# Patient Record
Sex: MALE | Race: BLACK OR AFRICAN AMERICAN | NOT HISPANIC OR LATINO | Employment: FULL TIME | URBAN - METROPOLITAN AREA
[De-identification: names, ages, dates, MRNs, and addresses within clinical notes are randomized per-mention and may not be internally consistent; named-entity substitution may affect disease eponyms.]

---

## 2023-05-22 ENCOUNTER — OFFICE VISIT (OUTPATIENT)
Dept: PRIMARY CARE | Facility: CLINIC | Age: 38
End: 2023-05-22
Payer: COMMERCIAL

## 2023-05-22 ENCOUNTER — LAB (OUTPATIENT)
Dept: LAB | Facility: LAB | Age: 38
End: 2023-05-22
Payer: COMMERCIAL

## 2023-05-22 VITALS
DIASTOLIC BLOOD PRESSURE: 80 MMHG | SYSTOLIC BLOOD PRESSURE: 134 MMHG | HEART RATE: 87 BPM | RESPIRATION RATE: 17 BRPM | HEIGHT: 67 IN | WEIGHT: 167.7 LBS | BODY MASS INDEX: 26.32 KG/M2 | TEMPERATURE: 97.3 F | OXYGEN SATURATION: 96 %

## 2023-05-22 DIAGNOSIS — L30.9 ECZEMA, UNSPECIFIED TYPE: Primary | ICD-10-CM

## 2023-05-22 DIAGNOSIS — G47.33 OBSTRUCTIVE SLEEP APNEA OF ADULT: ICD-10-CM

## 2023-05-22 DIAGNOSIS — M10.9 GOUT, UNSPECIFIED CAUSE, UNSPECIFIED CHRONICITY, UNSPECIFIED SITE: ICD-10-CM

## 2023-05-22 DIAGNOSIS — E78.5 HYPERLIPIDEMIA, UNSPECIFIED HYPERLIPIDEMIA TYPE: ICD-10-CM

## 2023-05-22 PROBLEM — M21.6X2 PRONATION OF BOTH FEET: Status: ACTIVE | Noted: 2023-05-22

## 2023-05-22 PROBLEM — J35.3 TONSILLAR AND ADENOID HYPERTROPHY: Status: ACTIVE | Noted: 2023-05-22

## 2023-05-22 PROBLEM — M21.42 FLAT FEET, BILATERAL: Status: ACTIVE | Noted: 2023-05-22

## 2023-05-22 PROBLEM — M21.6X1 PRONATION OF BOTH FEET: Status: ACTIVE | Noted: 2023-05-22

## 2023-05-22 PROBLEM — D64.9 ANEMIA: Status: ACTIVE | Noted: 2023-05-22

## 2023-05-22 PROBLEM — R10.31 RIGHT INGUINAL PAIN: Status: ACTIVE | Noted: 2023-05-22

## 2023-05-22 PROBLEM — M21.41 FLAT FEET, BILATERAL: Status: ACTIVE | Noted: 2023-05-22

## 2023-05-22 PROBLEM — M25.571 ACUTE RIGHT ANKLE PAIN: Status: ACTIVE | Noted: 2023-05-22

## 2023-05-22 PROBLEM — F17.210 NICOTINE DEPENDENCE, CIGARETTES, UNCOMPLICATED: Status: ACTIVE | Noted: 2023-05-22

## 2023-05-22 LAB
BASOPHILS (10*3/UL) IN BLOOD BY AUTOMATED COUNT: 0.05 X10E9/L (ref 0–0.1)
BASOPHILS/100 LEUKOCYTES IN BLOOD BY AUTOMATED COUNT: 0.5 % (ref 0–2)
EOSINOPHILS (10*3/UL) IN BLOOD BY AUTOMATED COUNT: 0.15 X10E9/L (ref 0–0.7)
EOSINOPHILS/100 LEUKOCYTES IN BLOOD BY AUTOMATED COUNT: 1.6 % (ref 0–6)
ERYTHROCYTE DISTRIBUTION WIDTH (RATIO) BY AUTOMATED COUNT: 12.3 % (ref 11.5–14.5)
ERYTHROCYTE MEAN CORPUSCULAR HEMOGLOBIN CONCENTRATION (G/DL) BY AUTOMATED: 32.5 G/DL (ref 32–36)
ERYTHROCYTE MEAN CORPUSCULAR VOLUME (FL) BY AUTOMATED COUNT: 90 FL (ref 80–100)
ERYTHROCYTES (10*6/UL) IN BLOOD BY AUTOMATED COUNT: 4.73 X10E12/L (ref 4.5–5.9)
HEMATOCRIT (%) IN BLOOD BY AUTOMATED COUNT: 42.8 % (ref 41–52)
HEMOGLOBIN (G/DL) IN BLOOD: 13.9 G/DL (ref 13.5–17.5)
IMMATURE GRANULOCYTES/100 LEUKOCYTES IN BLOOD BY AUTOMATED COUNT: 0.2 % (ref 0–0.9)
LEUKOCYTES (10*3/UL) IN BLOOD BY AUTOMATED COUNT: 9.5 X10E9/L (ref 4.4–11.3)
LYMPHOCYTES (10*3/UL) IN BLOOD BY AUTOMATED COUNT: 2.78 X10E9/L (ref 1.2–4.8)
LYMPHOCYTES/100 LEUKOCYTES IN BLOOD BY AUTOMATED COUNT: 29.2 % (ref 13–44)
MONOCYTES (10*3/UL) IN BLOOD BY AUTOMATED COUNT: 0.56 X10E9/L (ref 0.1–1)
MONOCYTES/100 LEUKOCYTES IN BLOOD BY AUTOMATED COUNT: 5.9 % (ref 2–10)
NEUTROPHILS (10*3/UL) IN BLOOD BY AUTOMATED COUNT: 5.95 X10E9/L (ref 1.2–7.7)
NEUTROPHILS/100 LEUKOCYTES IN BLOOD BY AUTOMATED COUNT: 62.6 % (ref 40–80)
NRBC (PER 100 WBCS) BY AUTOMATED COUNT: 0 /100 WBC (ref 0–0)
PLATELETS (10*3/UL) IN BLOOD AUTOMATED COUNT: 378 X10E9/L (ref 150–450)
THYROTROPIN (MIU/L) IN SER/PLAS BY DETECTION LIMIT <= 0.05 MIU/L: 0.91 MIU/L (ref 0.44–3.98)

## 2023-05-22 PROCEDURE — 36415 COLL VENOUS BLD VENIPUNCTURE: CPT

## 2023-05-22 PROCEDURE — 99213 OFFICE O/P EST LOW 20 MIN: CPT | Performed by: FAMILY MEDICINE

## 2023-05-22 PROCEDURE — 1036F TOBACCO NON-USER: CPT | Performed by: FAMILY MEDICINE

## 2023-05-22 PROCEDURE — 83721 ASSAY OF BLOOD LIPOPROTEIN: CPT

## 2023-05-22 PROCEDURE — 80053 COMPREHEN METABOLIC PANEL: CPT

## 2023-05-22 PROCEDURE — 82465 ASSAY BLD/SERUM CHOLESTEROL: CPT

## 2023-05-22 PROCEDURE — 83718 ASSAY OF LIPOPROTEIN: CPT

## 2023-05-22 PROCEDURE — 84443 ASSAY THYROID STIM HORMONE: CPT

## 2023-05-22 PROCEDURE — 84550 ASSAY OF BLOOD/URIC ACID: CPT

## 2023-05-22 PROCEDURE — 85025 COMPLETE CBC W/AUTO DIFF WBC: CPT

## 2023-05-22 RX ORDER — COLCHICINE 0.6 MG/1
TABLET ORAL
COMMUNITY
Start: 2022-03-05 | End: 2023-09-25 | Stop reason: SDUPTHER

## 2023-05-22 RX ORDER — ALLOPURINOL 100 MG/1
1 TABLET ORAL DAILY
COMMUNITY
Start: 2022-02-07 | End: 2023-06-20 | Stop reason: SDUPTHER

## 2023-05-22 RX ORDER — TRIAMCINOLONE ACETONIDE 1 MG/G
CREAM TOPICAL 2 TIMES DAILY
Qty: 45 G | Refills: 0 | Status: SHIPPED | OUTPATIENT
Start: 2023-05-22 | End: 2024-02-23 | Stop reason: SDUPTHER

## 2023-05-22 ASSESSMENT — PATIENT HEALTH QUESTIONNAIRE - PHQ9
SUM OF ALL RESPONSES TO PHQ9 QUESTIONS 1 AND 2: 0
1. LITTLE INTEREST OR PLEASURE IN DOING THINGS: NOT AT ALL
2. FEELING DOWN, DEPRESSED OR HOPELESS: NOT AT ALL

## 2023-05-22 ASSESSMENT — LIFESTYLE VARIABLES: HOW MANY STANDARD DRINKS CONTAINING ALCOHOL DO YOU HAVE ON A TYPICAL DAY: PATIENT DOES NOT DRINK

## 2023-05-22 NOTE — PROGRESS NOTES
"Subjective   Patient ID: Pilo Hanks is a 38 y.o. male who presents for Follow-up (Pt states he has a dark spot in the middle of his chest).    HPI   New rash on chest- 4+ months ago  Non itchy  No change on shops/ deterg / lotions    GOUT  Doing well  Using allopurinol daily    DAMON  Did not get CPAP  Sleep is ok as long can get to sleep ( has young kids)  Wife not complaining of snoring    Review of Systems  All systems reviewed and neg if not noted in the HPI above       Objective   /80 (Patient Position: Sitting)   Pulse 87   Temp 36.3 °C (97.3 °F)   Resp 17   Ht 1.702 m (5' 7\")   Wt 76.1 kg (167 lb 11.2 oz)   SpO2 96%   BMI 26.27 kg/m²     Physical Exam  Skin:     Findings: Rash present. Rash is macular and scaling.                Assessment/Plan   Problem List Items Addressed This Visit          Nervous    Obstructive sleep apnea of adult       Other    Gout    Relevant Orders    Uric acid    HLD (hyperlipidemia)    Relevant Orders    Comprehensive Metabolic Panel    CBC and Auto Differential    Lipid Panel    TSH with reflex to Free T4 if abnormal     Other Visit Diagnoses       Eczema, unspecified type    -  Primary    Relevant Medications    triamcinolone (Kenalog) 0.1 % cream               "

## 2023-05-22 NOTE — PATIENT INSTRUCTIONS
Labs today    Cream for rash 2 x per day X 2-3 wks- let me know if not better        Please follow up in 3months for rash follow up and wellness or as needed.       ** If labs or imaging ordered at today's visit, all the non-urgent results will be discussed at your next visit    If you have been referred for a special test or to a specialist please call  [phone] to schedule an appointment.  If you have any further questions, or if develop new or worsened symptoms, please give our office a call at [phone].

## 2023-05-23 LAB
ALANINE AMINOTRANSFERASE (SGPT) (U/L) IN SER/PLAS: 10 U/L (ref 10–52)
ALBUMIN (G/DL) IN SER/PLAS: 5 G/DL (ref 3.4–5)
ALKALINE PHOSPHATASE (U/L) IN SER/PLAS: 50 U/L (ref 33–120)
ANION GAP IN SER/PLAS: 16 MMOL/L (ref 10–20)
ASPARTATE AMINOTRANSFERASE (SGOT) (U/L) IN SER/PLAS: 15 U/L (ref 9–39)
BILIRUBIN TOTAL (MG/DL) IN SER/PLAS: 0.5 MG/DL (ref 0–1.2)
CALCIUM (MG/DL) IN SER/PLAS: 9.8 MG/DL (ref 8.6–10.6)
CARBON DIOXIDE, TOTAL (MMOL/L) IN SER/PLAS: 29 MMOL/L (ref 21–32)
CHLORIDE (MMOL/L) IN SER/PLAS: 101 MMOL/L (ref 98–107)
CHOLESTEROL (MG/DL) IN SER/PLAS: 255 MG/DL (ref 0–199)
CHOLESTEROL IN HDL (MG/DL) IN SER/PLAS: 42.5 MG/DL
CHOLESTEROL IN LDL (MG/DL) IN SER/PLAS BY DIRECT ASSAY: 189 MG/DL (ref 0–129)
CHOLESTEROL/HDL RATIO: 6
CREATININE (MG/DL) IN SER/PLAS: 1.18 MG/DL (ref 0.5–1.3)
GFR MALE: 80 ML/MIN/1.73M2
GLUCOSE (MG/DL) IN SER/PLAS: 69 MG/DL (ref 74–99)
NON-HDL CHOLESTEROL: 213 MG/DL
POTASSIUM (MMOL/L) IN SER/PLAS: 4.5 MMOL/L (ref 3.5–5.3)
PROTEIN TOTAL: 8.4 G/DL (ref 6.4–8.2)
SODIUM (MMOL/L) IN SER/PLAS: 141 MMOL/L (ref 136–145)
URATE (MG/DL) IN SER/PLAS: 6.8 MG/DL (ref 4–7.5)
UREA NITROGEN (MG/DL) IN SER/PLAS: 16 MG/DL (ref 6–23)

## 2023-05-24 DIAGNOSIS — E78.5 HYPERLIPIDEMIA, UNSPECIFIED HYPERLIPIDEMIA TYPE: Primary | ICD-10-CM

## 2023-06-20 ENCOUNTER — PATIENT MESSAGE (OUTPATIENT)
Dept: PRIMARY CARE | Facility: CLINIC | Age: 38
End: 2023-06-20
Payer: COMMERCIAL

## 2023-06-20 DIAGNOSIS — M10.9 GOUT, UNSPECIFIED CAUSE, UNSPECIFIED CHRONICITY, UNSPECIFIED SITE: Primary | ICD-10-CM

## 2023-06-21 RX ORDER — ALLOPURINOL 100 MG/1
100 TABLET ORAL DAILY
Qty: 90 TABLET | Refills: 3 | Status: SHIPPED | OUTPATIENT
Start: 2023-06-21 | End: 2024-02-23 | Stop reason: SDUPTHER

## 2023-09-08 ENCOUNTER — APPOINTMENT (OUTPATIENT)
Dept: PRIMARY CARE | Facility: CLINIC | Age: 38
End: 2023-09-08
Payer: COMMERCIAL

## 2023-09-22 ENCOUNTER — LAB (OUTPATIENT)
Dept: LAB | Facility: LAB | Age: 38
End: 2023-09-22
Payer: COMMERCIAL

## 2023-09-22 DIAGNOSIS — E78.5 HYPERLIPIDEMIA, UNSPECIFIED HYPERLIPIDEMIA TYPE: ICD-10-CM

## 2023-09-22 LAB
ALANINE AMINOTRANSFERASE (SGPT) (U/L) IN SER/PLAS: 11 U/L (ref 10–52)
ALBUMIN (G/DL) IN SER/PLAS: 4.5 G/DL (ref 3.4–5)
ALKALINE PHOSPHATASE (U/L) IN SER/PLAS: 46 U/L (ref 33–120)
ANION GAP IN SER/PLAS: 13 MMOL/L (ref 10–20)
ASPARTATE AMINOTRANSFERASE (SGOT) (U/L) IN SER/PLAS: 16 U/L (ref 9–39)
BILIRUBIN TOTAL (MG/DL) IN SER/PLAS: 0.6 MG/DL (ref 0–1.2)
CALCIUM (MG/DL) IN SER/PLAS: 9.4 MG/DL (ref 8.6–10.6)
CARBON DIOXIDE, TOTAL (MMOL/L) IN SER/PLAS: 29 MMOL/L (ref 21–32)
CHLORIDE (MMOL/L) IN SER/PLAS: 102 MMOL/L (ref 98–107)
CHOLESTEROL (MG/DL) IN SER/PLAS: 226 MG/DL (ref 0–199)
CHOLESTEROL IN HDL (MG/DL) IN SER/PLAS: 39.7 MG/DL
CHOLESTEROL/HDL RATIO: 5.7
CREATININE (MG/DL) IN SER/PLAS: 1.2 MG/DL (ref 0.5–1.3)
GFR MALE: 79 ML/MIN/1.73M2
GLUCOSE (MG/DL) IN SER/PLAS: 86 MG/DL (ref 74–99)
LDL: 149 MG/DL (ref 0–99)
POTASSIUM (MMOL/L) IN SER/PLAS: 4.1 MMOL/L (ref 3.5–5.3)
PROTEIN TOTAL: 7.4 G/DL (ref 6.4–8.2)
SODIUM (MMOL/L) IN SER/PLAS: 140 MMOL/L (ref 136–145)
TRIGLYCERIDE (MG/DL) IN SER/PLAS: 187 MG/DL (ref 0–149)
UREA NITROGEN (MG/DL) IN SER/PLAS: 13 MG/DL (ref 6–23)
VLDL: 37 MG/DL (ref 0–40)

## 2023-09-22 PROCEDURE — 80053 COMPREHEN METABOLIC PANEL: CPT

## 2023-09-22 PROCEDURE — 36415 COLL VENOUS BLD VENIPUNCTURE: CPT

## 2023-09-22 PROCEDURE — 80061 LIPID PANEL: CPT

## 2023-09-25 ENCOUNTER — OFFICE VISIT (OUTPATIENT)
Dept: PRIMARY CARE | Facility: CLINIC | Age: 38
End: 2023-09-25
Payer: COMMERCIAL

## 2023-09-25 VITALS
DIASTOLIC BLOOD PRESSURE: 70 MMHG | TEMPERATURE: 97.3 F | HEIGHT: 67 IN | HEART RATE: 65 BPM | RESPIRATION RATE: 12 BRPM | OXYGEN SATURATION: 97 % | WEIGHT: 168.3 LBS | SYSTOLIC BLOOD PRESSURE: 118 MMHG | BODY MASS INDEX: 26.42 KG/M2

## 2023-09-25 DIAGNOSIS — Z00.00 WELLNESS EXAMINATION: Primary | ICD-10-CM

## 2023-09-25 DIAGNOSIS — M10.9 GOUT, UNSPECIFIED CAUSE, UNSPECIFIED CHRONICITY, UNSPECIFIED SITE: ICD-10-CM

## 2023-09-25 DIAGNOSIS — E78.5 HYPERLIPIDEMIA, UNSPECIFIED HYPERLIPIDEMIA TYPE: ICD-10-CM

## 2023-09-25 DIAGNOSIS — Z30.09 FAMILY PLANNING COUNSELING: ICD-10-CM

## 2023-09-25 PROBLEM — F17.210 NICOTINE DEPENDENCE, CIGARETTES, UNCOMPLICATED: Status: RESOLVED | Noted: 2023-05-22 | Resolved: 2023-09-25

## 2023-09-25 PROCEDURE — 99395 PREV VISIT EST AGE 18-39: CPT | Performed by: FAMILY MEDICINE

## 2023-09-25 PROCEDURE — 1036F TOBACCO NON-USER: CPT | Performed by: FAMILY MEDICINE

## 2023-09-25 RX ORDER — COLCHICINE 0.6 MG/1
TABLET ORAL
Qty: 9 TABLET | Refills: 0 | Status: SHIPPED | OUTPATIENT
Start: 2023-09-25 | End: 2024-02-23 | Stop reason: SDUPTHER

## 2023-09-25 ASSESSMENT — PROMIS GLOBAL HEALTH SCALE
RATE_GENERAL_HEALTH: FAIR
EMOTIONAL_PROBLEMS: RARELY
RATE_SOCIAL_SATISFACTION: EXCELLENT
CARRYOUT_PHYSICAL_ACTIVITIES: COMPLETELY
RATE_QUALITY_OF_LIFE: VERY GOOD
RATE_AVERAGE_PAIN: 1
RATE_MENTAL_HEALTH: EXCELLENT
CARRYOUT_SOCIAL_ACTIVITIES: EXCELLENT
RATE_PHYSICAL_HEALTH: GOOD

## 2023-09-25 ASSESSMENT — PATIENT HEALTH QUESTIONNAIRE - PHQ9
2. FEELING DOWN, DEPRESSED OR HOPELESS: NOT AT ALL
1. LITTLE INTEREST OR PLEASURE IN DOING THINGS: NOT AT ALL
SUM OF ALL RESPONSES TO PHQ9 QUESTIONS 1 AND 2: 0

## 2023-09-25 NOTE — PROGRESS NOTES
"Subjective   Patient ID: Pilo Hanks is a 38 y.o. male who presents for Annual Exam.    HPI     Would like to get vasectomy    GOUT  Doing well  Using allopurinol daily     DAMON- thinking doing ok w/o  Wife states no snoring  Thinking due to after eoth- has cut back  No daytime sleepiness    HLD  LDL dec to 149- down from 189  Working on diet and exercise    ---Social---  Job: accounting  : wife  Kids:3 (15, 8, 3)- [place]  Likes: sport- watch and play ( basketball and football)  No foreign travel plans      ETOH - Weekend/ social  Drugs - Marijuana (weekends)  Tobacco - Quit smoking 8month      Review of Systems  All systems reviewed and neg if not noted in the HPI above     Objective   /70 (Patient Position: Sitting)   Pulse 65   Temp 36.3 °C (97.3 °F)   Resp 12   Ht 1.702 m (5' 7\")   Wt 76.3 kg (168 lb 4.8 oz)   SpO2 97%   BMI 26.36 kg/m²     Physical Exam  Pleasant  Eyes: conjunctiva non-icteric and eye lids are without obvious rash or drooping. Pupils are symmetric.   Ears, Nose, Mouth, and Throat: External ears and nose appear to be without deformity or rash. No lesions or masses noted. Hearing is grossly intact.   CV: RRR, no murmur  Pulm:CTA B/L  Abd: soft, NTTP, + BS  LE: no edema  Psychiatric: Alert, orientation to person, place, and time. Recent/remote memory as evidenced through face-to-face interaction and discussion appear grossly intact. Mood and affect are normal.        Assessment/Plan   Problem List Items Addressed This Visit       Gout    Relevant Medications    colchicine, gout, 0.6 mg tablet    HLD (hyperlipidemia)     Other Visit Diagnoses       Wellness examination    -  Primary    Family planning counseling        Relevant Orders    Referral to Urology              Please follow up in 1 yr for wellness  or as needed.      "

## 2023-09-25 NOTE — PATIENT INSTRUCTIONS
Labs prior to our next appt    To call for eye exam    Continue to work on healthy diet and exercise  We encourage all patients to engage in exercise 150 minutes per week   Adults 18 and older, activity during each week - if you are able:    Engage in at least 150 minutes of moderate or vigorous exercise per week   If you are able- Engage in muscle strengthening activity on 2 or more days per week      Please follow up in 1 yr for wellness  or as needed.       ** If labs or imaging ordered at today's visit, all the non-urgent results will be discussed at your next visit    If you have been referred for a special test or to a specialist please call  [phone] to schedule an appointment.  If you have any further questions, or if develop new or worsened symptoms, please give our office a call at [phone].

## 2024-02-20 ENCOUNTER — APPOINTMENT (OUTPATIENT)
Dept: PRIMARY CARE | Facility: CLINIC | Age: 39
End: 2024-02-20
Payer: COMMERCIAL

## 2024-02-20 ENCOUNTER — TELEPHONE (OUTPATIENT)
Dept: PRIMARY CARE | Facility: CLINIC | Age: 39
End: 2024-02-20

## 2024-02-20 NOTE — TELEPHONE ENCOUNTER
Patient stated he sent you a message and pictures via [employer]. He noticed a growth on the back of his tongue. He was advised to go to .

## 2024-02-23 ENCOUNTER — LAB (OUTPATIENT)
Dept: LAB | Facility: LAB | Age: 39
End: 2024-02-23
Payer: COMMERCIAL

## 2024-02-23 ENCOUNTER — OFFICE VISIT (OUTPATIENT)
Dept: PRIMARY CARE | Facility: CLINIC | Age: 39
End: 2024-02-23
Payer: COMMERCIAL

## 2024-02-23 VITALS
OXYGEN SATURATION: 96 % | HEIGHT: 67 IN | WEIGHT: 167.3 LBS | RESPIRATION RATE: 16 BRPM | DIASTOLIC BLOOD PRESSURE: 76 MMHG | BODY MASS INDEX: 26.26 KG/M2 | HEART RATE: 68 BPM | TEMPERATURE: 98 F | SYSTOLIC BLOOD PRESSURE: 126 MMHG

## 2024-02-23 DIAGNOSIS — J35.3 TONSILLAR AND ADENOID HYPERTROPHY: ICD-10-CM

## 2024-02-23 DIAGNOSIS — L30.9 ECZEMA, UNSPECIFIED TYPE: ICD-10-CM

## 2024-02-23 DIAGNOSIS — M10.9 GOUT, UNSPECIFIED CAUSE, UNSPECIFIED CHRONICITY, UNSPECIFIED SITE: ICD-10-CM

## 2024-02-23 DIAGNOSIS — K13.79 MASS OF ORAL CAVITY: Primary | ICD-10-CM

## 2024-02-23 DIAGNOSIS — K13.79 MASS OF ORAL CAVITY: ICD-10-CM

## 2024-02-23 LAB
BASOPHILS # BLD AUTO: 0.04 X10*3/UL (ref 0–0.1)
BASOPHILS NFR BLD AUTO: 0.5 %
EOSINOPHIL # BLD AUTO: 0.16 X10*3/UL (ref 0–0.7)
EOSINOPHIL NFR BLD AUTO: 1.9 %
ERYTHROCYTE [DISTWIDTH] IN BLOOD BY AUTOMATED COUNT: 12.9 % (ref 11.5–14.5)
HCT VFR BLD AUTO: 40.9 % (ref 41–52)
HGB BLD-MCNC: 13.7 G/DL (ref 13.5–17.5)
IMM GRANULOCYTES # BLD AUTO: 0.03 X10*3/UL (ref 0–0.7)
IMM GRANULOCYTES NFR BLD AUTO: 0.4 % (ref 0–0.9)
LYMPHOCYTES # BLD AUTO: 3.16 X10*3/UL (ref 1.2–4.8)
LYMPHOCYTES NFR BLD AUTO: 37.8 %
MCH RBC QN AUTO: 29.7 PG (ref 26–34)
MCHC RBC AUTO-ENTMCNC: 33.5 G/DL (ref 32–36)
MCV RBC AUTO: 89 FL (ref 80–100)
MONOCYTES # BLD AUTO: 0.7 X10*3/UL (ref 0.1–1)
MONOCYTES NFR BLD AUTO: 8.4 %
NEUTROPHILS # BLD AUTO: 4.28 X10*3/UL (ref 1.2–7.7)
NEUTROPHILS NFR BLD AUTO: 51 %
NRBC BLD-RTO: 0 /100 WBCS (ref 0–0)
PLATELET # BLD AUTO: 311 X10*3/UL (ref 150–450)
RBC # BLD AUTO: 4.62 X10*6/UL (ref 4.5–5.9)
WBC # BLD AUTO: 8.4 X10*3/UL (ref 4.4–11.3)

## 2024-02-23 PROCEDURE — 1036F TOBACCO NON-USER: CPT | Performed by: FAMILY MEDICINE

## 2024-02-23 PROCEDURE — 99213 OFFICE O/P EST LOW 20 MIN: CPT | Performed by: FAMILY MEDICINE

## 2024-02-23 PROCEDURE — 36415 COLL VENOUS BLD VENIPUNCTURE: CPT

## 2024-02-23 PROCEDURE — 85025 COMPLETE CBC W/AUTO DIFF WBC: CPT

## 2024-02-23 RX ORDER — COLCHICINE 0.6 MG/1
TABLET ORAL
Qty: 9 TABLET | Refills: 0 | Status: SHIPPED | OUTPATIENT
Start: 2024-02-23

## 2024-02-23 RX ORDER — ALLOPURINOL 100 MG/1
100 TABLET ORAL DAILY
Qty: 90 TABLET | Refills: 3 | Status: SHIPPED | OUTPATIENT
Start: 2024-02-23

## 2024-02-23 RX ORDER — TRIAMCINOLONE ACETONIDE 1 MG/G
CREAM TOPICAL 2 TIMES DAILY
Qty: 80 G | Refills: 1 | Status: SHIPPED | OUTPATIENT
Start: 2024-02-23 | End: 2024-06-01 | Stop reason: HOSPADM

## 2024-02-23 ASSESSMENT — LIFESTYLE VARIABLES
HOW MANY STANDARD DRINKS CONTAINING ALCOHOL DO YOU HAVE ON A TYPICAL DAY: 1 OR 2
AUDIT-C TOTAL SCORE: 1
SKIP TO QUESTIONS 9-10: 1
HOW OFTEN DO YOU HAVE A DRINK CONTAINING ALCOHOL: MONTHLY OR LESS
HOW OFTEN DO YOU HAVE SIX OR MORE DRINKS ON ONE OCCASION: NEVER

## 2024-02-23 ASSESSMENT — PATIENT HEALTH QUESTIONNAIRE - PHQ9
1. LITTLE INTEREST OR PLEASURE IN DOING THINGS: NOT AT ALL
2. FEELING DOWN, DEPRESSED OR HOPELESS: NOT AT ALL
SUM OF ALL RESPONSES TO PHQ9 QUESTIONS 1 & 2: 0

## 2024-02-23 NOTE — PROGRESS NOTES
"Subjective   Patient ID: Pilo Hanks is a 39 y.o. male who presents for Follow-up (Patien is following up for ENT referral.).    HPI     4days now with growth on he left tonsilar area    Review of Systems  All systems reviewed and neg if not noted in the HPI above     Objective   /76 (BP Location: Right arm, Patient Position: Sitting, BP Cuff Size: Adult)   Pulse 68   Temp 36.7 °C (98 °F)   Resp 16   Ht 1.702 m (5' 7\")   Wt 75.9 kg (167 lb 4.8 oz)   SpO2 96%   BMI 26.20 kg/m²     Physical Exam  HENT:      Mouth/Throat:      Mouth: Mucous membranes are pale and dry. Oral lesions present. No injury, lacerations or angioedema.      Palate: No lesions.      Tonsils: No tonsillar exudate.           Assessment/Plan   Problem List Items Addressed This Visit             ICD-10-CM    Gout M10.9    Relevant Medications    allopurinol (Zyloprim) 100 mg tablet    colchicine 0.6 mg tablet    Tonsillar and adenoid hypertrophy J35.3    Relevant Orders    Referral to ENT    CBC and Auto Differential (Completed)     Other Visit Diagnoses         Codes    Mass of oral cavity    -  Primary K13.79    Relevant Orders    Referral to ENT    CBC and Auto Differential (Completed)    Eczema, unspecified type     L30.9    Relevant Medications    triamcinolone (Kenalog) 0.1 % cream               Please follow up in as scheduled in Sept for wellness or as needed.       "

## 2024-02-23 NOTE — PATIENT INSTRUCTIONS
Oral cavity mass  - referral for ENT    Labs today (ordered prior appt)    GOUT  - ok  - checking labs  - ran out allopurinol X 2 wks      Please follow up in as scheduled in Sept for wellness or as needed.       ** If labs or imaging ordered at today's visit, all the non-urgent results will be discussed at your next visit    If you have been referred for a special test or to a specialist please call  [phone] to schedule an appointment.  If you have any further questions, or if develop new or worsened symptoms, please give our office a call at [phone].

## 2024-03-05 ENCOUNTER — APPOINTMENT (OUTPATIENT)
Dept: OTOLARYNGOLOGY | Facility: HOSPITAL | Age: 39
End: 2024-03-05
Payer: COMMERCIAL

## 2024-03-12 ENCOUNTER — OFFICE VISIT (OUTPATIENT)
Dept: UROLOGY | Facility: HOSPITAL | Age: 39
End: 2024-03-12
Payer: COMMERCIAL

## 2024-03-12 DIAGNOSIS — Z30.09 VASECTOMY EVALUATION: Primary | ICD-10-CM

## 2024-03-12 PROCEDURE — 1036F TOBACCO NON-USER: CPT | Performed by: STUDENT IN AN ORGANIZED HEALTH CARE EDUCATION/TRAINING PROGRAM

## 2024-03-12 PROCEDURE — 99214 OFFICE O/P EST MOD 30 MIN: CPT | Performed by: STUDENT IN AN ORGANIZED HEALTH CARE EDUCATION/TRAINING PROGRAM

## 2024-03-12 PROCEDURE — 99204 OFFICE O/P NEW MOD 45 MIN: CPT | Performed by: STUDENT IN AN ORGANIZED HEALTH CARE EDUCATION/TRAINING PROGRAM

## 2024-03-12 NOTE — PROGRESS NOTES
Subjective   Patient ID: Pilo Hanks is a 39 y.o. male    HPI  39 y.o. male who present for vasectomy evaluation, the patient has three kids, I had a long and extensive discussion with the patient regarding vasectomy. I informed him that he should consider this procedure as permanent and he should be 100% sure about proceeding with it. I explained to him in detail the steps of the vasectomy, I also had a long discussion with him regarding the possible side effects including infection, hematoma, bleeding, chronic pain, testicular congestion, injury to surrounding structure. We also discussed the multiple studies linking vasectomy to prostate cancer I explained to him the correlation between this procedure and prostate cancer. The patient verbalized understanding of all the risks and benefits and would like to proceed. I explained to him that there is a small chance of spontaneous return of the semen because of reconnection of his vas ending. I also told him that he is not close to the sterile unless it is proven by a post vasectomy semen analysis after around 3 months. I also explained to him that some man with a much longer to clear his semen from there, this most could take up to 6 months during which he would be still able to proceed.      Review of Systems    All systems were reviewed. Anything negative was noted in the HPI.    Objective   Physical Exam    General: Well developed, well nourished, alert and cooperative, appears in no acute distress   Eyes: Non-injected conjunctiva, sclera clear, no proptosis   Cardiac: Extremities are warm and well perfused. No edema, cyanosis or pallor   Lungs: Breathing is easy, non-labored. Speaking in clear and complete sentences. Normal diaphragmatic movement   MSK: Ambulatory with steady gait, unassisted   Neuro: Alert and oriented to person, place, and time   Psych: Demonstrates good judgment and reason, without hallucinations, abnormal affect or abnormal behaviors   Skin:  No obvious lesions, no rashes       No CVA tenderness bilaterally   No suprapubic pain or discomfort       Past Medical History:   Diagnosis Date    Acute tonsillitis, unspecified 01/21/2016    Acute tonsillitis, unspecified etiology    Hypertrophy of tonsils 01/11/2017    Lingual tonsil hypertrophy    Hypertrophy of tonsils 01/05/2017    Tonsillar hypertrophy    Other specified health status     No pertinent past medical history    Pain in right toe(s) 09/14/2021    Great toe pain, right    Pain in unspecified ankle and joints of unspecified foot 09/15/2021    Ankle pain    Personal history of other diseases of the nervous system and sense organs 07/27/2021    History of obstructive sleep apnea    Personal history of other diseases of the respiratory system 07/27/2021    History of acute pharyngitis    Personal history of other diseases of the respiratory system 01/21/2016    History of sore throat    Personal history of other diseases of the respiratory system 01/05/2017    History of airway obstruction    Personal history of other specified conditions 07/27/2021    History of chest pain    Sprain of joints and ligaments of unspecified parts of neck, initial encounter 08/21/2021    Neck sprain    Sprain of unspecified ligament of right ankle, initial encounter 03/26/2017    Sprain of right ankle, unspecified ligament, initial encounter         Past Surgical History:   Procedure Laterality Date    MOUTH SURGERY  01/11/2017    Oral Surgery Tooth Extraction       Assessment/Plan   Vasectomy evaluation    39 y.o. male who presents for the above condition, Vasectomy Evaluation     Patient presents today for evaluation of vasectomy. We had an extensive discussion about the procedure and post-procedure protocol. We discussed that vasectomy is intended to be a permanent form of contraception. There are options for fertility post vasectomy, including vasectomy reversal and sperm retrieval but these are not always  successful and can be rather expensive.  We highlighted that it is not an immediate form of contraception, and that another form is required until vas occlusion is confirmed with a post-vasectomy semen analysis. We recommend that the patient refrain from ejaculation for 1 week post-procedure and we would be checking a post-vasectomy semen analysis in 2-3 months, or 15-20 ejaculates. Need for repeat vasectomy is very low, <1%. There is a very small risk for pregnancy after vasectomy (1 in 2,000). We define a successful vasectomy by achieving azoospermia or less than 100,000 non-motile sperm on post-vasectomy semen analysis.  We discussed that the procedure would be done in the office under local anesthesia. Complications were discussed including but not limited to pain, which can be chronic in nature, bleeding/hematoma formation, and infection. We recommended ice and rest for the next 48-72hrs. Patient may be prescribed an anti-inflammatory for pain control. Patient is instructed to refrain from strenuous activity for 2 weeks.  No barriers to learning were identified. After all of the patient’s questions were satisfactorily answered, he expressed understanding of the risks of surgery and wishes to proceed with vasectomy.     Plan:  - Schedule patient for vasectomy           Scribe Attestation  By signing my name below, ISandy Scribe   attest that this documentation has been prepared under the direction and in the presence of Dr. David Orourke

## 2024-03-15 ENCOUNTER — OFFICE VISIT (OUTPATIENT)
Dept: OTOLARYNGOLOGY | Facility: HOSPITAL | Age: 39
End: 2024-03-15
Payer: COMMERCIAL

## 2024-03-15 VITALS — HEIGHT: 67 IN | TEMPERATURE: 97 F | BODY MASS INDEX: 26.48 KG/M2 | WEIGHT: 168.7 LBS

## 2024-03-15 DIAGNOSIS — K14.8 MASS OF TONGUE: Primary | ICD-10-CM

## 2024-03-15 PROCEDURE — 99204 OFFICE O/P NEW MOD 45 MIN: CPT | Performed by: OTOLARYNGOLOGY

## 2024-03-15 PROCEDURE — 99214 OFFICE O/P EST MOD 30 MIN: CPT | Performed by: OTOLARYNGOLOGY

## 2024-03-15 PROCEDURE — 88305 TISSUE EXAM BY PATHOLOGIST: CPT | Mod: TC,SUR | Performed by: OTOLARYNGOLOGY

## 2024-03-15 PROCEDURE — 1036F TOBACCO NON-USER: CPT | Performed by: OTOLARYNGOLOGY

## 2024-03-15 PROCEDURE — 88305 TISSUE EXAM BY PATHOLOGIST: CPT | Performed by: DENTIST

## 2024-03-15 PROCEDURE — 88342 IMHCHEM/IMCYTCHM 1ST ANTB: CPT | Performed by: DENTIST

## 2024-03-15 ASSESSMENT — PATIENT HEALTH QUESTIONNAIRE - PHQ9
SUM OF ALL RESPONSES TO PHQ9 QUESTIONS 1 & 2: 0
1. LITTLE INTEREST OR PLEASURE IN DOING THINGS: NOT AT ALL
2. FEELING DOWN, DEPRESSED OR HOPELESS: NOT AT ALL

## 2024-03-15 NOTE — PROGRESS NOTES
ENT New Patient Visit    Chief complaint: tongue mass    History Of Present Illness  Pilo Hanks is a 39 y.o. male presents for evaluation of a left tongue mass.  He noticed it while brushing his teeth 2 weeks ago. No pain or bleeding but does feel like there is something in his throat. No trouble swallowing.     Past Medical History  He has a past medical history of Acute tonsillitis, unspecified (01/21/2016), Hypertrophy of tonsils (01/11/2017), Hypertrophy of tonsils (01/05/2017), Other specified health status, Pain in right toe(s) (09/14/2021), Pain in unspecified ankle and joints of unspecified foot (09/15/2021), Personal history of other diseases of the nervous system and sense organs (07/27/2021), Personal history of other diseases of the respiratory system (07/27/2021), Personal history of other diseases of the respiratory system (01/21/2016), Personal history of other diseases of the respiratory system (01/05/2017), Personal history of other specified conditions (07/27/2021), Sprain of joints and ligaments of unspecified parts of neck, initial encounter (08/21/2021), and Sprain of unspecified ligament of right ankle, initial encounter (03/26/2017).    Surgical History  He has a past surgical history that includes Mouth surgery (01/11/2017).     Social History  He reports that he quit smoking about 14 months ago. His smoking use included cigarettes. He has never used smokeless tobacco. He reports that he does not currently use alcohol. He reports current drug use. Drug: Marijuana.    Family History  No family history on file.     Allergies  Patient has no known allergies.    Review of Systems     Physical Exam:  CONSTITUTIONAL:  No acute distress  VOICE:  No hoarseness or other abnormality  RESPIRATION:  Breathing comfortably, no stridor  CV:  No clubbing/cyanosis/edema in hands  EYES:  EOM intact, sclera normal  NEURO:  Alert and oriented times 3, Cranial nerves II-XII grossly intact and symmetric  "bilaterally  HEAD AND FACE:  Symmetric facial features, no masses or lesions, sinuses non-tender to palpation  SALIVARY GLANDS:  Parotid and submandibular glands normal bilaterally  EARS:  Normal external ears, external auditory canals, and TMs to otoscopy, normal hearing to whispered voice.  NOSE:  External nose midline, anterior rhinoscopy is normal with limited visualization to the anterior aspect of the interior turbinates, no bleeding or drainage, no lesions  ORAL CAVITY/OROPHARYNX/LIPS:  left base of tongue with exophytic granular mass ~ 1.5 cm in size.  PHARYNGEAL WALLS:  No masses or lesions  NECK/LYMPH:  No LAD, no thyroid masses, trachea midline  SKIN:  Neck skin is without scar or injury  PSYCH:  Alert and oriented with appropriate mood and affect    Procedure:  The left tongue mass was anesthetized with  1% lidocaine with 1:100,000 epinephrine  A 15 blade was used to excise a small portion of the mass  Patient rinsed his mouth after and no significant bleeding was noted     Last Recorded Vitals  Temperature 36.1 °C (97 °F), height 1.702 m (5' 7\"), weight 76.5 kg (168 lb 11.2 oz).     Assessment and Plan  39 y.o. male with a left base of tongue mass  -follow up biopsy  -ct neck w/ contrast    Phi Cruz MD    "

## 2024-03-20 ENCOUNTER — HOSPITAL ENCOUNTER (OUTPATIENT)
Facility: HOSPITAL | Age: 39
Setting detail: OUTPATIENT SURGERY
End: 2024-03-20
Attending: OTOLARYNGOLOGY | Admitting: OTOLARYNGOLOGY
Payer: COMMERCIAL

## 2024-03-20 DIAGNOSIS — K14.8 MASS OF TONGUE: Primary | ICD-10-CM

## 2024-03-25 ENCOUNTER — PRE-ADMISSION TESTING (OUTPATIENT)
Dept: PREADMISSION TESTING | Facility: HOSPITAL | Age: 39
End: 2024-03-25
Payer: COMMERCIAL

## 2024-03-25 VITALS
HEIGHT: 67 IN | HEART RATE: 78 BPM | SYSTOLIC BLOOD PRESSURE: 138 MMHG | BODY MASS INDEX: 26.73 KG/M2 | DIASTOLIC BLOOD PRESSURE: 88 MMHG | OXYGEN SATURATION: 99 % | WEIGHT: 170.3 LBS | TEMPERATURE: 97.1 F

## 2024-03-25 DIAGNOSIS — K14.8 MASS OF TONGUE: ICD-10-CM

## 2024-03-25 DIAGNOSIS — Z01.818 PREOP EXAMINATION: Primary | ICD-10-CM

## 2024-03-25 DIAGNOSIS — C01 MALIGNANT NEOPLASM OF BASE OF TONGUE (MULTI): Primary | ICD-10-CM

## 2024-03-25 LAB
ABO GROUP (TYPE) IN BLOOD: NORMAL
ALBUMIN SERPL BCP-MCNC: 4.7 G/DL (ref 3.4–5)
ALP SERPL-CCNC: 42 U/L (ref 33–120)
ALT SERPL W P-5'-P-CCNC: 20 U/L (ref 10–52)
AMPHETAMINES UR QL SCN: ABNORMAL
ANION GAP SERPL CALC-SCNC: 14 MMOL/L (ref 10–20)
ANTIBODY SCREEN: NORMAL
APTT PPP: 31 SECONDS (ref 27–38)
AST SERPL W P-5'-P-CCNC: 16 U/L (ref 9–39)
BARBITURATES UR QL SCN: ABNORMAL
BENZODIAZ UR QL SCN: ABNORMAL
BILIRUB SERPL-MCNC: 0.4 MG/DL (ref 0–1.2)
BUN SERPL-MCNC: 10 MG/DL (ref 6–23)
BZE UR QL SCN: ABNORMAL
CALCIUM SERPL-MCNC: 9.6 MG/DL (ref 8.6–10.6)
CANNABINOIDS UR QL SCN: ABNORMAL
CHLORIDE SERPL-SCNC: 99 MMOL/L (ref 98–107)
CO2 SERPL-SCNC: 29 MMOL/L (ref 21–32)
CREAT SERPL-MCNC: 1.02 MG/DL (ref 0.5–1.3)
EGFRCR SERPLBLD CKD-EPI 2021: >90 ML/MIN/1.73M*2
ERYTHROCYTE [DISTWIDTH] IN BLOOD BY AUTOMATED COUNT: 12.7 % (ref 11.5–14.5)
FENTANYL+NORFENTANYL UR QL SCN: ABNORMAL
GLUCOSE SERPL-MCNC: 74 MG/DL (ref 74–99)
HCT VFR BLD AUTO: 43.7 % (ref 41–52)
HGB BLD-MCNC: 14.6 G/DL (ref 13.5–17.5)
INR PPP: 1 (ref 0.9–1.1)
LAB AP ASR DISCLAIMER: NORMAL
LABORATORY COMMENT REPORT: NORMAL
MCH RBC QN AUTO: 29.9 PG (ref 26–34)
MCHC RBC AUTO-ENTMCNC: 33.4 G/DL (ref 32–36)
MCV RBC AUTO: 90 FL (ref 80–100)
METHADONE UR QL SCN: ABNORMAL
NRBC BLD-RTO: 0 /100 WBCS (ref 0–0)
OPIATES UR QL SCN: ABNORMAL
OXYCODONE+OXYMORPHONE UR QL SCN: ABNORMAL
PATH REPORT.FINAL DX SPEC: NORMAL
PATH REPORT.GROSS SPEC: NORMAL
PATH REPORT.RELEVANT HX SPEC: NORMAL
PATH REPORT.TOTAL CANCER: NORMAL
PCP UR QL SCN: ABNORMAL
PLATELET # BLD AUTO: 334 X10*3/UL (ref 150–450)
POTASSIUM SERPL-SCNC: 3.8 MMOL/L (ref 3.5–5.3)
PROT SERPL-MCNC: 7.9 G/DL (ref 6.4–8.2)
PROTHROMBIN TIME: 11.2 SECONDS (ref 9.8–12.8)
RBC # BLD AUTO: 4.88 X10*6/UL (ref 4.5–5.9)
RH FACTOR (ANTIGEN D): NORMAL
SODIUM SERPL-SCNC: 138 MMOL/L (ref 136–145)
WBC # BLD AUTO: 8.7 X10*3/UL (ref 4.4–11.3)

## 2024-03-25 PROCEDURE — 85610 PROTHROMBIN TIME: CPT

## 2024-03-25 PROCEDURE — 36415 COLL VENOUS BLD VENIPUNCTURE: CPT

## 2024-03-25 PROCEDURE — 80307 DRUG TEST PRSMV CHEM ANLYZR: CPT

## 2024-03-25 PROCEDURE — 85027 COMPLETE CBC AUTOMATED: CPT

## 2024-03-25 PROCEDURE — 80053 COMPREHEN METABOLIC PANEL: CPT

## 2024-03-25 PROCEDURE — 80349 CANNABINOIDS NATURAL: CPT

## 2024-03-25 PROCEDURE — 86901 BLOOD TYPING SEROLOGIC RH(D): CPT

## 2024-03-25 PROCEDURE — 99203 OFFICE O/P NEW LOW 30 MIN: CPT | Performed by: NURSE PRACTITIONER

## 2024-03-25 RX ORDER — IBUPROFEN 100 MG/5ML
1000 SUSPENSION, ORAL (FINAL DOSE FORM) ORAL DAILY
COMMUNITY

## 2024-03-25 ASSESSMENT — ENCOUNTER SYMPTOMS
ENDOCRINE NEGATIVE: 1
CARDIOVASCULAR NEGATIVE: 1
NEUROLOGICAL NEGATIVE: 1
NECK NEGATIVE: 1
RESPIRATORY NEGATIVE: 1
CHILLS: 0
FEVER: 0
GASTROINTESTINAL NEGATIVE: 1
MUSCULOSKELETAL NEGATIVE: 1
CONSTITUTIONAL NEGATIVE: 1
EYES NEGATIVE: 1

## 2024-03-25 ASSESSMENT — DUKE ACTIVITY SCORE INDEX (DASI)
TOTAL_SCORE: 58.2
DASI METS SCORE: 9.9
CAN YOU CLIMB A FLIGHT OF STAIRS OR WALK UP A HILL: YES
CAN YOU DO MODERATE WORK AROUND THE HOUSE LIKE VACUUMING, SWEEPING FLOORS OR CARRYING GROCERIES: YES
CAN YOU PARTICIPATE IN MODERATE RECREATIONAL ACTIVITIES LIKE GOLF, BOWLING, DANCING, DOUBLES TENNIS OR THROWING A BASEBALL OR FOOTBALL: YES
CAN YOU HAVE SEXUAL RELATIONS: YES
CAN YOU DO YARD WORK LIKE RAKING LEAVES, WEEDING OR PUSHING A MOWER: YES
CAN YOU WALK INDOORS, SUCH AS AROUND YOUR HOUSE: YES
CAN YOU RUN A SHORT DISTANCE: YES
CAN YOU DO LIGHT WORK AROUND THE HOUSE LIKE DUSTING OR WASHING DISHES: YES
CAN YOU TAKE CARE OF YOURSELF (EAT, DRESS, BATHE, OR USE TOILET): YES
CAN YOU DO HEAVY WORK AROUND THE HOUSE LIKE SCRUBBING FLOORS OR LIFTING AND MOVING HEAVY FURNITURE: YES
CAN YOU WALK A BLOCK OR TWO ON LEVEL GROUND: YES
CAN YOU PARTICIPATE IN STRENOUS SPORTS LIKE SWIMMING, SINGLES TENNIS, FOOTBALL, BASKETBALL, OR SKIING: YES

## 2024-03-25 ASSESSMENT — CHADS2 SCORE
DIABETES: NO
AGE GREATER THAN OR EQUAL TO 75: NO
CHF: NO
CHADS2 SCORE: 0
HYPERTENSION: NO
PRIOR STROKE OR TIA OR THROMBOEMBOLISM: NO

## 2024-03-25 ASSESSMENT — LIFESTYLE VARIABLES: SMOKING_STATUS: NONSMOKER

## 2024-03-25 NOTE — CPM/PAT H&P
CPM/PAT Evaluation       Name: Pilo Hanks (Pilo Hanks)  /Age: 1985/39 y.o.     Visit Type:   In-Person       Chief Complaint: Glossectomy Partial - Left     HPI Patient is a 39 year old male, accompanied by significant other, scheduled for Glossectomy Partial - Left with Dr. Phi Cruz on 4-3-24 related to Mass of tongue.     Patient referred by Dr. Cruz for preoperative evaluation of hyperlipidemia and gout    Past Medical History:   Diagnosis Date    Acute tonsillitis, unspecified 2016    Acute tonsillitis, unspecified etiology    Hypertrophy of tonsils 2017    Lingual tonsil hypertrophy    Hypertrophy of tonsils 2017    Tonsillar hypertrophy    Other specified health status     No pertinent past medical history    Pain in right toe(s) 2021    Great toe pain, right    Pain in unspecified ankle and joints of unspecified foot 09/15/2021    Ankle pain    Personal history of other diseases of the nervous system and sense organs 2021    History of obstructive sleep apnea    Personal history of other diseases of the respiratory system 2021    History of acute pharyngitis    Personal history of other diseases of the respiratory system 2016    History of sore throat    Personal history of other diseases of the respiratory system 2017    History of airway obstruction    Personal history of other specified conditions 2021    History of chest pain    Sprain of joints and ligaments of unspecified parts of neck, initial encounter 2021    Neck sprain    Sprain of unspecified ligament of right ankle, initial encounter 2017    Sprain of right ankle, unspecified ligament, initial encounter     Past Surgical History:   Procedure Laterality Date    MOUTH SURGERY  2017    Oral Surgery Tooth Extraction     Family History   Problem Relation Name Age of Onset    No Known Problems Mother      No Known Problems Father      Diabetes Paternal Grandfather      Heart  failure Paternal Grandfather       No Known Allergies    Prior to Admission medications    Medication Sig Start Date End Date Taking? Authorizing Provider   allopurinol (Zyloprim) 100 mg tablet Take 1 tablet (100 mg) by mouth once daily. 2/23/24  Yes Ruth Villa DO   triamcinolone (Kenalog) 0.1 % cream Apply topically 2 times a day. Apply to affected area 1-2 times daily as needed. 2/23/24  Yes Ruth Villa DO   ascorbic acid (Vitamin C) 1,000 mg tablet Take 1 tablet (1,000 mg) by mouth once daily.    Historical Provider, MD   colchicine 0.6 mg tablet Use 2 tbs at the sign of gout PO can repeat in 1 hr if still with pain X 1 2/23/24   Ruth Villa DO      PAT ROS:   Constitutional:   neg     no fever   no chills  Neuro/Psych:   neg    Eyes:   neg    Ears:    Tinnitus at times   tinnitus  Nose:   neg    Mouth:   neg    Throat:   neg    Neck:   neg    Cardio:   neg    Respiratory:   neg    Endocrine:   neg    GI:   neg    :   neg    Musculoskeletal:    Fallen arches-ankle pain  neg    Hematologic:   neg     no history of blood transfusion   no blood clots  Skin:  neg      Physical Exam  Vitals reviewed.   Constitutional:       Appearance: Normal appearance. He is normal weight.   HENT:      Head: Normocephalic and atraumatic.      Nose: Nose normal.      Mouth/Throat:      Mouth: Mucous membranes are moist.      Pharynx: Oropharynx is clear.     Eyes:      Extraocular Movements: Extraocular movements intact.      Pupils: Pupils are equal, round, and reactive to light.   Cardiovascular:      Rate and Rhythm: Normal rate and regular rhythm.      Heart sounds: Normal heart sounds.   Pulmonary:      Effort: Pulmonary effort is normal.      Breath sounds: Normal breath sounds.   Abdominal:      General: Abdomen is flat. Bowel sounds are normal.      Palpations: Abdomen is soft.   Musculoskeletal:         General: Normal range of motion.      Cervical back: Normal range of motion and neck supple.   Skin:      General: Skin is warm and dry.   Neurological:      Mental Status: He is alert and oriented to person, place, and time.   Psychiatric:         Mood and Affect: Mood normal.         Behavior: Behavior normal.         Thought Content: Thought content normal.         Judgment: Judgment normal.        PAT AIRWAY:   Airway:     Mallampati::  II    Neck ROM::  Full   States nothing loose or removable   Facial hair    Visit Vitals  /88 Comment: manual recheck   Pulse 78   Temp 36.2 °C (97.1 °F) (Temporal)     DASI Risk Score      Flowsheet Row Most Recent Value   DASI SCORE 58.2   METS Score (Will be calculated only when all the questions are answered) 9.9          Caprini DVT Assessment      Flowsheet Row Most Recent Value   DVT Score 4   Current Status Major surgery planned, including arthroscopic and laproscopic (1-2 hours)   Age 40-59 years   BMI 30 or less          Modified Frailty Index      Flowsheet Row Most Recent Value   Modified Frailty Index Calculator 0          CHADS2 Stroke Risk         N/A 3 - 100%: High Risk   2 - 3%: Medium Risk   0 - 2%: Low Risk     Last Change: N/A          This score determines the patient's risk of having a stroke if the patient has atrial fibrillation.        This score is not applicable to this patient. Components are not calculated.          Revised Cardiac Risk Index      Flowsheet Row Most Recent Value   Revised Cardiac Risk Calculator 0          Apfel Simplified Score      Flowsheet Row Most Recent Value   Apfel Simplified Score Calculator 2          Risk Analysis Index Results This Encounter    No data found in the last 1 encounters.       Stop Bang Score      Flowsheet Row Most Recent Value   Do you snore loudly? 0   Do you often feel tired or fatigued after your sleep? 0   Has anyone ever observed you stop breathing in your sleep? 0   Do you have or are you being treated for high blood pressure? 0   Recent BMI (Calculated) 26.4   Is BMI greater than 35 kg/m2? 0=No    Age older than 50 years old? 0=No   Is your neck circumference greater than 17 inches (Male) or 16 inches (Female)? 0   Gender - Male 1=Yes   STOP-BANG Total Score 1          Assessment and Plan:     Neuro:   No diagnosis or significant findings on chart review or clinical presentation and evaluation.    Patient given information on brain exercises and delirium prevention.    HEENT/Airway    Mass noted to the right back portion of tongue    Cardiovascular    Hyperlipidemia  Not currently taking medications  9-22-23:     RCRI  The patient meets 0-1 RCRI criteria and therefore has a less than 1% risk of major adverse cardiac complications.  METS  The patient's functional capacity is greater than 4 METS.  MACE  30-day risk for MACE of 0 predictors, 3.9% risk for cardiac death, nonfatal myocardial infarction, and nonfatal cardiac arrest  ARMANDO  0.0% risk for <25th percentile    3-25-24: EKG sinus rhythm     Pulmonary   No diagnosis or significant findings on chart review or clinical presentation and evaluation.    STOP BANG Score of 1, which places patient at low risk for having DAMON.  ARISCAT 0, low, 1.6% risk of in-hospital postoperative pulmonary complications  PRODIGY 11, intermediate of respiratory depression episode.    Patient given information on deep breathing exercises.     Renal  No diagnosis or significant findings on chart review or clinical presentation and evaluation.    Endocrine  No diagnosis or significant findings on chart review or clinical presentation and evaluation.    Hematology  No diagnosis or significant findings on chart review or clinical presentation and evaluation.    Caprini score 4, intermediate risk of VTE    Transfusion Evaluation  A type and screen was obtained given the likelihood for perioperative transfusion of blood or blood products.    Patient given information on DVT prevention.     GI  No diagnosis or significant findings on chart review or clinical presentation and  evaluation.    Eat 10- 0  Apfel: 2 points 39% risk for post operative nausea/vomiting.     Genitourinary  No diagnosis or significant findings on chart review or clinical presentation and evaluation.    ID  No diagnosis or significant findings on chart review or clinical presentation and evaluation.    Musculoskeletal    Gout  States had a small gouty flare several weeks ago and used colchine .  5-22-23: Uric acid level 6.8    -Preoperative medication instructions were provided and reviewed with the patient.  Any additional testing or evaluation was explained to the patient.  NPO Instructions were discussed, and the patient's questions were answered prior to conclusion of this encounter    Labs ordered:    Results for orders placed or performed in visit on 03/25/24   CBC   Result Value Ref Range    WBC 8.7 4.4 - 11.3 x10*3/uL    nRBC 0.0 0.0 - 0.0 /100 WBCs    RBC 4.88 4.50 - 5.90 x10*6/uL    Hemoglobin 14.6 13.5 - 17.5 g/dL    Hematocrit 43.7 41.0 - 52.0 %    MCV 90 80 - 100 fL    MCH 29.9 26.0 - 34.0 pg    MCHC 33.4 32.0 - 36.0 g/dL    RDW 12.7 11.5 - 14.5 %    Platelets 334 150 - 450 x10*3/uL   Comprehensive Metabolic Panel   Result Value Ref Range    Glucose 74 74 - 99 mg/dL    Sodium 138 136 - 145 mmol/L    Potassium 3.8 3.5 - 5.3 mmol/L    Chloride 99 98 - 107 mmol/L    Bicarbonate 29 21 - 32 mmol/L    Anion Gap 14 10 - 20 mmol/L    Urea Nitrogen 10 6 - 23 mg/dL    Creatinine 1.02 0.50 - 1.30 mg/dL    eGFR >90 >60 mL/min/1.73m*2    Calcium 9.6 8.6 - 10.6 mg/dL    Albumin 4.7 3.4 - 5.0 g/dL    Alkaline Phosphatase 42 33 - 120 U/L    Total Protein 7.9 6.4 - 8.2 g/dL    AST 16 9 - 39 U/L    Bilirubin, Total 0.4 0.0 - 1.2 mg/dL    ALT 20 10 - 52 U/L   Type And Screen   Result Value Ref Range    ABO TYPE A     Rh TYPE POS     ANTIBODY SCREEN NEG    Coagulation Screen   Result Value Ref Range    Protime 11.2 9.8 - 12.8 seconds    INR 1.0 0.9 - 1.1    aPTT 31 27 - 38 seconds   Drug Screen, Urine With Reflex to  Confirmation   Result Value Ref Range    Amphetamine Screen, Urine Presumptive Negative Presumptive Negative    Barbiturate Screen, Urine Presumptive Negative Presumptive Negative    Benzodiazepines Screen, Urine Presumptive Negative Presumptive Negative    Cannabinoid Screen, Urine Presumptive Positive (A) Presumptive Negative    Cocaine Metabolite Screen, Urine Presumptive Negative Presumptive Negative    Fentanyl Screen, Urine Presumptive Negative Presumptive Negative    Opiate Screen, Urine Presumptive Negative Presumptive Negative    Oxycodone Screen, Urine Presumptive Negative Presumptive Negative    PCP Screen, Urine Presumptive Negative Presumptive Negative    Methadone Screen, Urine Presumptive Negative Presumptive Negative

## 2024-03-25 NOTE — PREPROCEDURE INSTRUCTIONS
NPO Instructions:    Do not eat any food after midnight the night before your surgery/procedure.  You may have 10 ounces clear liquids until TWO hours before surgery/procedure. This includes water, black tea/coffee, (no milk or cream) apple juice and electrolyte drinks (Gatorade).  You may chew gum up to TWO hours before your surgery/procedure.    Additional Instructions:     Seven/Six Days before Surgery:  Review your medication instructions, stop indicated medications    Five Days before Surgery:  Review your medication instructions, stop indicated medications    Three Days before Surgery:  Review your medication instructions, stop indicated medications    The Day before Surgery:  Review your medication instructions, stop indicated medications  You will be contacted regarding the time of your arrival to facility and surgery time  Do not eat any food after Midnight    Day of Surgery:  Review your medication instructions, take indicated medications  You may have clear liquids until TWO hours before surgery/procedure.  This includes water, black tea/coffee, (no milk or cream) apple juice and electrolyte drinks (Gatorade)  You may chew gum up to TWO hours before your surgery/procedure  Wear  comfortable loose fitting clothing  Do not use moisturizers, creams, lotions or perfume  All jewelry and valuables should be left at home    Avoid herbal supplements, multivitamins and NSAIDS (non-steroidal anti-inflammatory drugs) such as Advil, Aleve, Ibuprofen, Naproxen, Excedrin, Meloxicam or Celebrex for at least 7 days prior to surgery. May take Tylenol as needed.    Marjorie Crespo, MSN, NP-C, CNP  Family Nurse Practitioner  Department of Anesthesiology and Perioperative Medicine  Main phone: [phone]  Fax :[phone]  Direct: [phone]      Thank you for visiting the [facility].  If you have any changes to your health condition, please call the surgeons office to alert them and give them  details of your symptoms.        Preoperative Brain Exercises    What are brain exercises?  A brain exercise is any activity that engages your thinking (cognitive) skills.    What types of activities are considered brain exercises?  Jigsaw puzzles, crossword puzzles, word jumble, memory games, word search, and many more.  Many can be found free online or on your phone via a mobile coy.    Why should I do brain exercises before my surgery?  More recent research has shown brain exercise before surgery can lower the risk of postoperative delirium (confusion) which can be especially important for older adults.  Patients who did brain exercises for 5 to 10 hours the days before surgery, cut their risk of postoperative delirium in half up to 1 week after surgery.                  [facility]    Preoperative Deep Breathing Exercises    Why it is important to do deep breathing exercises before my surgery?  Deep breathing exercises strengthen your breathing muscles.  This helps you to recover after your surgery and decreases the chance of breathing complications.      How are the deep breathing exercises done?  Sit straight with your back supported.  Breathe in deeply and slowly through your nose. Your lower rib cage should expand and your abdomen may move forward.  Hold that breath for 3 to 5 seconds.  Breathe out through pursed lips, slowly and completely.  Rest and repeat 10 times every hour while awake.  Rest longer if you become dizzy or lightheaded.                Patient and Family Education             Ways You Can Help Prevent Blood Clots             This handout explains some simple things you can do to help prevent blood clots.      Blood clots are blockages that can form in the body's veins. When a blood clot forms in your deep veins, it may be called a deep vein thrombosis, or DVT for short. Blood clots can happen in any part of the body where blood flows, but they are most common in  the arms and legs. If a piece of a blood clot breaks free and travels to the lungs, it is called a pulmonary embolus (PE). A PE can be a very serious problem.         Being in the hospital or having surgery can raise your chances of getting a blood clot because you may not be well enough to move around as much as you normally do.         Ways you can help prevent blood clots in the hospital         Wearing SCDs. SCDs stands for Sequential Compression Devices.   SCDs are special sleeves that wrap around your legs  They attach to a pump that fills them with air to gently squeeze your legs every few minutes.   This helps return the blood in your legs to your heart.   SCDs should only be taken off when walking or bathing.   SCDs may not be comfortable, but they can help save your life.               Wearing compression stockings - if your doctor orders them. These special snug fitting stockings gently squeeze your legs to help blood flow.       Walking. Walking helps move the blood in your legs.   If your doctor says it is ok, try walking the halls at least   5 times a day. Ask us to help you get up, so you don't fall.      Taking any blood thinning medicines your doctor orders.        Page 1 of 2     [facility]; 3/23   Ways you can help prevent blood clots at home       Wearing compression stockings - if your doctor orders them. ? Walking - to help move the blood in your legs.       Taking any blood thinning medicines your doctor orders.      Signs of a blood clot or PE      Tell your doctor or nurse know right away if you have of the problems listed below.    If you are at home, seek medical care right away. Call 911 for chest pain or problems breathing.               Signs of a blood clot (DVT) - such as pain,  swelling, redness or warmth in your arm or leg      Signs of a pulmonary embolism (PE) - such as chest     pain or feeling short of breath

## 2024-03-28 LAB — CARBOXYTHC UR-MCNC: 39 NG/ML

## 2024-03-29 ENCOUNTER — HOSPITAL ENCOUNTER (OUTPATIENT)
Dept: RADIOLOGY | Facility: CLINIC | Age: 39
Discharge: HOME | End: 2024-03-29
Payer: COMMERCIAL

## 2024-03-29 ENCOUNTER — TELEMEDICINE (OUTPATIENT)
Dept: OTOLARYNGOLOGY | Facility: HOSPITAL | Age: 39
End: 2024-03-29
Payer: COMMERCIAL

## 2024-03-29 DIAGNOSIS — K14.8 MASS OF TONGUE: ICD-10-CM

## 2024-03-29 DIAGNOSIS — C01 CANCER OF BASE OF TONGUE (MULTI): Primary | ICD-10-CM

## 2024-03-29 DIAGNOSIS — C01 MALIGNANT NEOPLASM OF BASE OF TONGUE (MULTI): ICD-10-CM

## 2024-03-29 PROCEDURE — 99213 OFFICE O/P EST LOW 20 MIN: CPT | Performed by: OTOLARYNGOLOGY

## 2024-03-29 PROCEDURE — 70491 CT SOFT TISSUE NECK W/DYE: CPT

## 2024-03-29 PROCEDURE — 99213 OFFICE O/P EST LOW 20 MIN: CPT | Mod: GT,95 | Performed by: OTOLARYNGOLOGY

## 2024-03-29 PROCEDURE — 71250 CT THORAX DX C-: CPT

## 2024-03-29 PROCEDURE — 1036F TOBACCO NON-USER: CPT | Performed by: OTOLARYNGOLOGY

## 2024-03-29 PROCEDURE — 71250 CT THORAX DX C-: CPT | Performed by: RADIOLOGY

## 2024-03-29 PROCEDURE — 2550000001 HC RX 255 CONTRASTS: Performed by: OTOLARYNGOLOGY

## 2024-03-29 PROCEDURE — 70491 CT SOFT TISSUE NECK W/DYE: CPT | Performed by: RADIOLOGY

## 2024-03-29 RX ADMIN — IOHEXOL 68 ML: 350 INJECTION, SOLUTION INTRAVENOUS at 11:12

## 2024-03-29 NOTE — PROGRESS NOTES
ENT New Patient Visit    Chief complaint: tongue mass    History Of Present Illness  Pilo Hanks is a 39 y.o. male presents for evaluation of a left tongue mass.  He noticed it while brushing his teeth 2 weeks ago. No pain or bleeding but does feel like there is something in his throat. No trouble swallowing.    3/29/24 virtual visit: completed CT scans today, biopsy showed p16+ scca. He had no bleeding     Past Medical History  He has a past medical history of Acute tonsillitis, unspecified (01/21/2016), Hypertrophy of tonsils (01/11/2017), Hypertrophy of tonsils (01/05/2017), Other specified health status, Pain in right toe(s) (09/14/2021), Pain in unspecified ankle and joints of unspecified foot (09/15/2021), Personal history of other diseases of the nervous system and sense organs (07/27/2021), Personal history of other diseases of the respiratory system (07/27/2021), Personal history of other diseases of the respiratory system (01/21/2016), Personal history of other diseases of the respiratory system (01/05/2017), Personal history of other specified conditions (07/27/2021), Sprain of joints and ligaments of unspecified parts of neck, initial encounter (08/21/2021), and Sprain of unspecified ligament of right ankle, initial encounter (03/26/2017).    Surgical History  He has a past surgical history that includes Mouth surgery (01/11/2017).     Social History  He reports that he quit smoking about 14 months ago. His smoking use included cigarettes. He has never used smokeless tobacco. He reports that he does not currently use alcohol. He reports current drug use. Drug: Marijuana.    Family History  Family History   Problem Relation Name Age of Onset    No Known Problems Mother      No Known Problems Father      Diabetes Paternal Grandfather      Heart failure Paternal Grandfather          Allergies  Patient has no known allergies.    Review of Systems     Physical Exam:  Virtual visit     Assessment and Plan  39  y.o. 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   We discussed that for this cancer, options include surgery +/- radiation based on stage, vs chemoradiation or radiation alone based on stage.   With the limited size of this lesion, surgery likely will have less long term morbidity even if adjuvant radiation is required due to omer mets  However, if he needs to have both necks treated, chemorad may be preferable to bl neck dissection and then adjuvant radiation  Will also order a circulating HPV dna test  RISKS for surgery discussed include bleeding, infection, CN weakness, fistula, chyle leak  -follow up on PET scan    Approximately 30 min spent with pt on counseling    Phi Cruz MD

## 2024-04-01 ENCOUNTER — HOSPITAL ENCOUNTER (OUTPATIENT)
Dept: RADIOLOGY | Facility: HOSPITAL | Age: 39
Discharge: HOME | End: 2024-04-01
Payer: COMMERCIAL

## 2024-04-01 DIAGNOSIS — C01 CANCER OF BASE OF TONGUE (MULTI): ICD-10-CM

## 2024-04-01 DIAGNOSIS — C10.9 MALIGNANT NEOPLASM OF OROPHARYNX (MULTI): Primary | ICD-10-CM

## 2024-04-01 PROCEDURE — A9552 F18 FDG: HCPCS | Performed by: OTOLARYNGOLOGY

## 2024-04-01 PROCEDURE — 78815 PET IMAGE W/CT SKULL-THIGH: CPT | Mod: PI

## 2024-04-01 PROCEDURE — 3430000001 HC RX 343 DIAGNOSTIC RADIOPHARMACEUTICALS: Performed by: OTOLARYNGOLOGY

## 2024-04-01 PROCEDURE — 78815 PET IMAGE W/CT SKULL-THIGH: CPT | Mod: PET TUMOR INIT TX STRAT | Performed by: NUCLEAR MEDICINE

## 2024-04-01 RX ORDER — FLUDEOXYGLUCOSE F 18 200 MCI/ML
14.1 INJECTION, SOLUTION INTRAVENOUS
Status: COMPLETED | OUTPATIENT
Start: 2024-04-01 | End: 2024-04-01

## 2024-04-01 RX ADMIN — FLUDEOXYGLUCOSE F 18 14.1 MILLICURIE: 200 INJECTION, SOLUTION INTRAVENOUS at 09:02

## 2024-04-02 ENCOUNTER — ANESTHESIA EVENT (OUTPATIENT)
Dept: OPERATING ROOM | Facility: HOSPITAL | Age: 39
End: 2024-04-02

## 2024-04-03 ENCOUNTER — TELEPHONE (OUTPATIENT)
Dept: HEMATOLOGY/ONCOLOGY | Facility: HOSPITAL | Age: 39
End: 2024-04-03
Payer: COMMERCIAL

## 2024-04-03 ENCOUNTER — ANESTHESIA (OUTPATIENT)
Dept: OPERATING ROOM | Facility: HOSPITAL | Age: 39
End: 2024-04-03
Payer: COMMERCIAL

## 2024-04-03 ENCOUNTER — TELEPHONE (OUTPATIENT)
Dept: RADIATION ONCOLOGY | Facility: HOSPITAL | Age: 39
End: 2024-04-03
Payer: COMMERCIAL

## 2024-04-03 NOTE — TELEPHONE ENCOUNTER
Called patient to confirm new patient visit tomorrow, 4/3/24. Patient aware to stop for labs before appointment and where to find us tomorrow for his appointment with Dr. Burkitt. No additional questions at this time.

## 2024-04-04 ENCOUNTER — OFFICE VISIT (OUTPATIENT)
Dept: HEMATOLOGY/ONCOLOGY | Facility: HOSPITAL | Age: 39
End: 2024-04-04
Payer: COMMERCIAL

## 2024-04-04 ENCOUNTER — HOSPITAL ENCOUNTER (OUTPATIENT)
Dept: RADIATION ONCOLOGY | Facility: HOSPITAL | Age: 39
Setting detail: RADIATION/ONCOLOGY SERIES
Discharge: HOME | End: 2024-04-04
Payer: COMMERCIAL

## 2024-04-04 ENCOUNTER — LAB (OUTPATIENT)
Dept: LAB | Facility: HOSPITAL | Age: 39
End: 2024-04-04
Payer: COMMERCIAL

## 2024-04-04 ENCOUNTER — NUTRITION (OUTPATIENT)
Dept: HEMATOLOGY/ONCOLOGY | Facility: HOSPITAL | Age: 39
End: 2024-04-04

## 2024-04-04 ENCOUNTER — HOSPITAL ENCOUNTER (OUTPATIENT)
Dept: RADIOLOGY | Facility: EXTERNAL LOCATION | Age: 39
Discharge: HOME | End: 2024-04-04

## 2024-04-04 VITALS
HEART RATE: 89 BPM | DIASTOLIC BLOOD PRESSURE: 99 MMHG | HEIGHT: 67 IN | OXYGEN SATURATION: 99 % | BODY MASS INDEX: 26.51 KG/M2 | SYSTOLIC BLOOD PRESSURE: 145 MMHG | RESPIRATION RATE: 18 BRPM | TEMPERATURE: 98.4 F | WEIGHT: 168.87 LBS

## 2024-04-04 VITALS
SYSTOLIC BLOOD PRESSURE: 136 MMHG | HEART RATE: 80 BPM | WEIGHT: 168.43 LBS | BODY MASS INDEX: 26.44 KG/M2 | RESPIRATION RATE: 20 BRPM | DIASTOLIC BLOOD PRESSURE: 86 MMHG | TEMPERATURE: 97.7 F | OXYGEN SATURATION: 99 % | HEIGHT: 67 IN

## 2024-04-04 DIAGNOSIS — K14.8 MASS OF TONGUE: ICD-10-CM

## 2024-04-04 DIAGNOSIS — C10.9 MALIGNANT NEOPLASM OF OROPHARYNX (MULTI): ICD-10-CM

## 2024-04-04 DIAGNOSIS — Z00.00 WELLNESS EXAMINATION: ICD-10-CM

## 2024-04-04 DIAGNOSIS — C10.9 MALIGNANT NEOPLASM OF OROPHARYNX (MULTI): Primary | ICD-10-CM

## 2024-04-04 DIAGNOSIS — K14.8 MASS OF TONGUE: Primary | ICD-10-CM

## 2024-04-04 LAB
ALBUMIN SERPL BCP-MCNC: 4.5 G/DL (ref 3.4–5)
ALP SERPL-CCNC: 44 U/L (ref 33–120)
ALT SERPL W P-5'-P-CCNC: 13 U/L (ref 10–52)
ANION GAP SERPL CALC-SCNC: 14 MMOL/L (ref 10–20)
AST SERPL W P-5'-P-CCNC: 13 U/L (ref 9–39)
BASOPHILS # BLD AUTO: 0.03 X10*3/UL (ref 0–0.1)
BASOPHILS NFR BLD AUTO: 0.3 %
BILIRUB SERPL-MCNC: 0.7 MG/DL (ref 0–1.2)
BUN SERPL-MCNC: 9 MG/DL (ref 6–23)
CALCIUM SERPL-MCNC: 9 MG/DL (ref 8.6–10.3)
CHLORIDE SERPL-SCNC: 98 MMOL/L (ref 98–107)
CO2 SERPL-SCNC: 29 MMOL/L (ref 21–32)
CREAT SERPL-MCNC: 1.27 MG/DL (ref 0.5–1.3)
EGFRCR SERPLBLD CKD-EPI 2021: 73 ML/MIN/1.73M*2
EOSINOPHIL # BLD AUTO: 0.21 X10*3/UL (ref 0–0.7)
EOSINOPHIL NFR BLD AUTO: 2.3 %
ERYTHROCYTE [DISTWIDTH] IN BLOOD BY AUTOMATED COUNT: 12.6 % (ref 11.5–14.5)
GLUCOSE SERPL-MCNC: 77 MG/DL (ref 74–99)
HCT VFR BLD AUTO: 42 % (ref 41–52)
HGB BLD-MCNC: 14.1 G/DL (ref 13.5–17.5)
IMM GRANULOCYTES # BLD AUTO: 0.02 X10*3/UL (ref 0–0.7)
IMM GRANULOCYTES NFR BLD AUTO: 0.2 % (ref 0–0.9)
LYMPHOCYTES # BLD AUTO: 1.6 X10*3/UL (ref 1.2–4.8)
LYMPHOCYTES NFR BLD AUTO: 17.2 %
MCH RBC QN AUTO: 29.1 PG (ref 26–34)
MCHC RBC AUTO-ENTMCNC: 33.6 G/DL (ref 32–36)
MCV RBC AUTO: 87 FL (ref 80–100)
MONOCYTES # BLD AUTO: 0.91 X10*3/UL (ref 0.1–1)
MONOCYTES NFR BLD AUTO: 9.8 %
NEUTROPHILS # BLD AUTO: 6.55 X10*3/UL (ref 1.2–7.7)
NEUTROPHILS NFR BLD AUTO: 70.2 %
NRBC BLD-RTO: 0 /100 WBCS (ref 0–0)
PLATELET # BLD AUTO: 304 X10*3/UL (ref 150–450)
POTASSIUM SERPL-SCNC: 4.1 MMOL/L (ref 3.5–5.3)
PROT SERPL-MCNC: 8.4 G/DL (ref 6.4–8.2)
RBC # BLD AUTO: 4.84 X10*6/UL (ref 4.5–5.9)
SODIUM SERPL-SCNC: 137 MMOL/L (ref 136–145)
TSH SERPL-ACNC: 2.32 MIU/L (ref 0.44–3.98)
URATE SERPL-MCNC: 7.4 MG/DL (ref 4–7.5)
WBC # BLD AUTO: 9.3 X10*3/UL (ref 4.4–11.3)

## 2024-04-04 PROCEDURE — 77333 RADIATION TREATMENT AID(S): CPT | Performed by: RADIOLOGY

## 2024-04-04 PROCEDURE — 99417 PROLNG OP E/M EACH 15 MIN: CPT | Performed by: INTERNAL MEDICINE

## 2024-04-04 PROCEDURE — 36415 COLL VENOUS BLD VENIPUNCTURE: CPT

## 2024-04-04 PROCEDURE — 31575 DIAGNOSTIC LARYNGOSCOPY: CPT | Performed by: RADIOLOGY

## 2024-04-04 PROCEDURE — 99215 OFFICE O/P EST HI 40 MIN: CPT | Mod: GC | Performed by: RADIOLOGY

## 2024-04-04 PROCEDURE — 99205 OFFICE O/P NEW HI 60 MIN: CPT | Performed by: INTERNAL MEDICINE

## 2024-04-04 PROCEDURE — 80053 COMPREHEN METABOLIC PANEL: CPT

## 2024-04-04 PROCEDURE — 84443 ASSAY THYROID STIM HORMONE: CPT

## 2024-04-04 PROCEDURE — 85025 COMPLETE CBC W/AUTO DIFF WBC: CPT

## 2024-04-04 PROCEDURE — 77470 SPECIAL RADIATION TREATMENT: CPT | Performed by: RADIOLOGY

## 2024-04-04 PROCEDURE — 77334 RADIATION TREATMENT AID(S): CPT | Performed by: RADIOLOGY

## 2024-04-04 PROCEDURE — 77333 RADIATION TREATMENT AID(S): CPT | Mod: 59 | Performed by: RADIOLOGY

## 2024-04-04 PROCEDURE — 99205 OFFICE O/P NEW HI 60 MIN: CPT | Performed by: RADIOLOGY

## 2024-04-04 PROCEDURE — 84550 ASSAY OF BLOOD/URIC ACID: CPT

## 2024-04-04 PROCEDURE — 77263 THER RADIOLOGY TX PLNG CPLX: CPT | Performed by: RADIOLOGY

## 2024-04-04 PROCEDURE — 99215 OFFICE O/P EST HI 40 MIN: CPT | Performed by: INTERNAL MEDICINE

## 2024-04-04 RX ORDER — BACLOFEN 5 MG/1
5 TABLET ORAL 3 TIMES DAILY
Qty: 30 TABLET | Refills: 1 | Status: SHIPPED | OUTPATIENT
Start: 2024-04-04 | End: 2024-04-24

## 2024-04-04 RX ORDER — GABAPENTIN 300 MG/1
300 CAPSULE ORAL 3 TIMES DAILY
Qty: 90 CAPSULE | Refills: 1 | Status: SHIPPED | OUTPATIENT
Start: 2024-04-04 | End: 2024-05-06 | Stop reason: ALTCHOICE

## 2024-04-04 ASSESSMENT — PATIENT HEALTH QUESTIONNAIRE - PHQ9
2. FEELING DOWN, DEPRESSED OR HOPELESS: NOT AT ALL
SUM OF ALL RESPONSES TO PHQ9 QUESTIONS 1 AND 2: 0
1. LITTLE INTEREST OR PLEASURE IN DOING THINGS: NOT AT ALL

## 2024-04-04 ASSESSMENT — COLUMBIA-SUICIDE SEVERITY RATING SCALE - C-SSRS
2. HAVE YOU ACTUALLY HAD ANY THOUGHTS OF KILLING YOURSELF?: NO
1. IN THE PAST MONTH, HAVE YOU WISHED YOU WERE DEAD OR WISHED YOU COULD GO TO SLEEP AND NOT WAKE UP?: NO
6. HAVE YOU EVER DONE ANYTHING, STARTED TO DO ANYTHING, OR PREPARED TO DO ANYTHING TO END YOUR LIFE?: NO

## 2024-04-04 ASSESSMENT — ENCOUNTER SYMPTOMS
OCCASIONAL FEELINGS OF UNSTEADINESS: 0
LOSS OF SENSATION IN FEET: 0
DEPRESSION: 0

## 2024-04-04 ASSESSMENT — PAIN SCALES - GENERAL: PAINLEVEL: 0-NO PAIN

## 2024-04-04 NOTE — PROGRESS NOTES
Patient ID: Pilo Hanks is a 39 y.o. male.  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 Phi Cruz  MedOnc:   Dr. Kyunghee Burkitt/Shaila Fitzgerald PA-C  RadOnc: Dr. Raquel Blackman    Mr. 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    -3/15/24: biopsy of left BOT showed p16 positive SCC  -3/13/29/24: CT neck and chest showed a soft tissue mass within the left oropharynx centered in the anterior tonsillar pillar/left base of tongue, measures approximately 1.8 x 1.4cm.  -4/1/24: PET/CT showed multiple hypermetabolic bilateral multilevel cervical lymphadenopathy, largest of which located at left cervical level 2, ~1.2cm.      Past Medical History:   Past Medical History:  01/21/2016: Acute tonsillitis, unspecified      Comment:  Acute tonsillitis, unspecified etiology  01/11/2017: Hypertrophy of tonsils      Comment:  Lingual tonsil hypertrophy  01/05/2017: Hypertrophy of tonsils      Comment:  Tonsillar hypertrophy  No date: Other specified health status      Comment:  No pertinent past medical history  09/14/2021: Pain in right toe(s)      Comment:  Great toe pain, right  09/15/2021: Pain in unspecified ankle and joints of unspecified foot      Comment:  Ankle pain  07/27/2021: Personal history of other diseases of the nervous system   and sense organs      Comment:  History of obstructive sleep apnea  07/27/2021: Personal history of other diseases of the respiratory   system      Comment:  History of acute pharyngitis  01/21/2016: Personal history of other diseases of the respiratory   system      Comment:  History of sore throat  01/05/2017: Personal history of other diseases of the respiratory   system      Comment:  History of airway obstruction  07/27/2021: Personal history of other specified conditions       Comment:  History of chest pain  2021: Sprain of joints and ligaments of unspecified parts of   neck, initial encounter      Comment:  Neck sprain  2017: Sprain of unspecified ligament of right ankle, initial   encounter      Comment:  Sprain of right ankle, unspecified ligament, initial                encounter   Surgical History:    Past Surgical History:   Procedure Laterality Date    MOUTH SURGERY  2017    Oral Surgery Tooth Extraction      Family History:    Family History   Problem Relation Name Age of Onset    No Known Problems Mother      No Known Problems Father      Diabetes Paternal Grandfather      Heart failure Paternal Grandfather       Family Oncology History:    Cancer-related family history is not on file.  Social History:    Social History     Tobacco Use    Smoking status: Former     Types: Cigarettes     Quit date:      Years since quittin.2    Smokeless tobacco: Never   Vaping Use    Vaping Use: Never used   Substance Use Topics    Alcohol use: Not Currently    Drug use: Yes     Types: Marijuana     Comment: Edibles          Subjective   Chief Complaint: left base of    HPI  Interval History  He reports very mild throat discomfort, but denies odynophagia and dysphagia.  Wt has been stable.  Denies fever, chills, nausea, vomiting, SOB, CP, abdominal pain, constipation and diarrhea.    ROS  Review of Systems - Oncology    Allergies  No Known Allergies     Medications  Current Outpatient Medications   Medication Instructions    allopurinol (ZYLOPRIM) 100 mg, oral, Daily    ascorbic acid (VITAMIN C) 1,000 mg, oral, Daily    colchicine 0.6 mg tablet Use 2 tbs at the sign of gout PO can repeat in 1 hr if still with pain X 1    triamcinolone (Kenalog) 0.1 % cream Topical, 2 times daily, Apply to affected area 1-2 times daily as needed.          Objective   VS: There were no vitals taken for this visit.  Weight: Daily Weight  24 : 77.2 kg (170 lb 4.8 oz)  03/15/24 : 76.5  "kg (168 lb 11.2 oz)  02/23/24 : 75.9 kg (167 lb 4.8 oz)  09/25/23 : 76.3 kg (168 lb 4.8 oz)  05/22/23 : 76.1 kg (167 lb 11.2 oz)  02/07/22 : 73 kg (161 lb)  07/27/21 : 73.9 kg (163 lb)      Physical Exam  Constitutional:       Appearance: Normal appearance.   HENT:      Head: Normocephalic and atraumatic.      Mouth/Throat:      Mouth: Mucous membranes are moist.   Eyes:      Extraocular Movements: Extraocular movements intact.      Pupils: Pupils are equal, round, and reactive to light.   Cardiovascular:      Rate and Rhythm: Normal rate and regular rhythm.      Pulses: Normal pulses.   Pulmonary:      Effort: Pulmonary effort is normal.      Breath sounds: Normal breath sounds.   Abdominal:      General: Bowel sounds are normal.   Musculoskeletal:         General: Normal range of motion.   Skin:     General: Skin is warm.   Neurological:      General: No focal deficit present.      Mental Status: He is alert and oriented to person, place, and time.   Psychiatric:         Mood and Affect: Mood normal.         Diagnostic Results   Labs  Below labs are reviewed today.                         Images       Pathology  No results found for: \"KBXHX\", \"PATHREP\", \"INTERP\", \"ADD1\", \"ADD2\", \"ADD3\", \"CORRECTIONHX\", \"ASTUDIES\", \"ANCILLARY1\", \"ANCILLARY2\", \"FINALINTERP\", \"COMDX\"      Assessment/Plan   Mr. 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  -GFR >90 (3/25/24 labs), no hearing deficits  -discussed with patient about SOC (concurrent chemorad with high dose cisplatin) vs clinical trial (: High dose cisplatin vs " weekly cis or [id number]: CRT followed by Nivolumab), patient might be interested in clinical trial, he will think about it over the weekend and let us know.

## 2024-04-04 NOTE — PROGRESS NOTES
"NUTRITION Assessment NOTE    Nutrition Assessment     Reason for Visit:  Pilo Hanks is a 39 y.o. male who presents for No chief complaint on file.    Diagnosis: BOT SCC    Subjective: Pt seen today with family in radiation. He reports eating well. We discussed nutrition implications of upcoming treatment. All questions answered.        2/23/2024     1:00 PM 3/15/2024     1:23 PM 3/25/2024     3:13 PM 3/25/2024     3:59 PM 4/4/2024    11:23 AM 4/4/2024     1:17 PM 4/4/2024     1:18 PM   Vitals   Systolic 126  142 138 136 139 145   Diastolic 76  91 88 86 84 99   Heart Rate 68  78  80 89 89   Temp 36.7 °C (98 °F) 36.1 °C (97 °F) 36.2 °C (97.1 °F)  36.5 °C (97.7 °F) 36.9 °C (98.4 °F)    Resp 16    20 18 18   Height (in) 1.702 m (5' 7\") 1.702 m (5' 7\") 1.702 m (5' 7\")  1.698 m (5' 6.85\")  1.698 m (5' 6.85\")    Weight (lb) 167.3 168.7 170.3  168.43 168.87    BMI 26.2 kg/m2 26.42 kg/m2 26.67 kg/m2  26.5 kg/m2 26.57 kg/m2    BSA (m2) 1.89 m2 1.9 m2 1.91 m2  1.9 m2 1.9 m2    Visit Report Report Report   Report Report Report       Significant value       Wt Readings from Last 10 Encounters:   04/04/24 76.6 kg (168 lb 14 oz)   04/04/24 76.4 kg (168 lb 6.9 oz)   03/25/24 77.2 kg (170 lb 4.8 oz)   03/15/24 76.5 kg (168 lb 11.2 oz)   02/23/24 75.9 kg (167 lb 4.8 oz)   09/25/23 76.3 kg (168 lb 4.8 oz)   05/22/23 76.1 kg (167 lb 11.2 oz)   02/07/22 73 kg (161 lb)   07/27/21 73.9 kg (163 lb)     Weight Change:  Weight lstable    Lab Results   Component Value Date/Time    GLUCOSE 77 04/04/2024 1101     04/04/2024 1101    K 4.1 04/04/2024 1101    CL 98 04/04/2024 1101    CO2 29 04/04/2024 1101    ANIONGAP 14 04/04/2024 1101    BUN 9 04/04/2024 1101    CREATININE 1.27 04/04/2024 1101    EGFR 73 04/04/2024 1101    CALCIUM 9.0 04/04/2024 1101    ALBUMIN 4.5 04/04/2024 1101    ALKPHOS 44 04/04/2024 1101    PROT 8.4 (H) 04/04/2024 1101    AST 13 04/04/2024 1101    BILITOT 0.7 04/04/2024 1101    ALT 13 04/04/2024 1101     Lab Results "   Component Value Date/Time    VITD25 13 (A) 08/18/2021 1050        Food And Nutrient Intake:  Current Intake: >75%  of estimated energy needs    Nutrition Focused Physical Exam Findings:  Performed/Deferred: Deferred as pt visually appears well-nourished with no signs of malnutrition    Malnutrition Present: No    ESTIMATED ENERGY NEEDS  Dosing weight: 72.3 kg  Calories per day: 2939-7021  determined by 25-30 kcal/kg  Protein (g) per day:  determined by 1.0-1.5 g/kg  Estimated fluid needs: 2951-2604 determined by 1 kcal/mL     NUTRITION DIAGNOSIS  Diagnosis Statement 1:  Diagnosis Status: New  Diagnosis : Predicted suboptimal energy intake related to  chronic condition  as evidenced by  diagnosis    NUTRITION INTERVENTION  Continue current diet  Adequate hydration    Diet Education Materials: High patricia, high protein nutrition, high patricia/protein snack ideas, smoothie recipe.    MONITOR AND EVALUATION  Monitor and Evaluation: Oral intake and Weight trend    Need for follow-up: Starting treatment         Readiness to Change : Excellent  Level of Understanding : Excellent  Anticipated Compliant : Excellent

## 2024-04-04 NOTE — PROGRESS NOTES
Radiation Oncology Outpatient Consult    Patient Name:  Pilo Hanks  MRN:  [id number]  :  1985    Referring Provider: Phi Cruz MD  Primary Care Provider: Ruth Villa DO  Care Team: Patient Care Team:  Ruth Villa DO as PCP - General  [facility]  Raquel Blackman MD as Radiation Oncologist (Radiation Oncology)  Kyunghee Burkitt, DO as Consulting Physician (Hematology and Oncology)    Date of Service: 2024     SUBJECTIVE  History of Present Illness:  Pilo Hanks is a 39 y.o. male who was referred by Phi Cruz MD, for a consultation to the [facility] Department of Radiation Oncology.  He is presenting for evaluation and management of oropharyngeal cancer.       39M initially presented a few weeks ago with Left tongue mass noticed while brushing his teeth.    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   Note: P16 immunohistochemical stain shows the lesional cells to be positive.   P16 by immunohistochemistry:  Positive    3/29/24 CT neck w/ contrast: IMPRESSION:  Ill-defined soft tissue prominence within the left oropharynx centered in the anterior tonsillar pillar/left base of tongue, may measure approximately 1.8 cm, and likely corresponds to lesion visualized on clinical exam. No involvement of the parapharyngeal  spaces or osseous involvement.  Soft tissue filling the vallecula is thought to represent lingual tonsil.  Bilateral prominent cervical lymph nodes, largest in left level 2A measuring 1.2 cm. No enlarged lymph nodes by size criteria.    3/29/24 CT chest w/o contrast: IMPRESSION:  1. There is a 3 mm left lower lobe nodule and a 2-3 mm left upper lobe nodule.  2. No evidence for lymphadenopathy. Probable hepatic cysts as described.  3. Incidental note is made of residual thymic tissue within the anterior  mediastinum.    4/1/24 PET CT: IMPRESSION:  1. Ill-defined large focal hypermetabolic soft tissue lesion centered in the anterior tonsillar pillar/left base of tongue bulging into the oropharynx, consistent with biopsy-proven squamous cell carcinoma.  2. Hypermetabolic focus at right tongue base, which could be physiologic, although neoplastic disease can not be excluded.  3. Multiple hypermetabolic bilateral multilevel cervical lymphadenopathy, largest of which located at left cervical level 2, as detailed above, compatible with locoregional metastatic omer  disease.  4. No evidence of hypermetabolic distant metastatic disease.      Today, patient reports doing well. He denies any pain. Tolerating regular diet PO without difficulty swallowing. His weight as been stable. Denies any other symptoms at this time.    Pmh: acute tonsillitis (2016), hypertrophy of tonsils (2017), neck sprain  Psh: mouth surgery (2017)  Fam: denies hx ca  Soc: quit smoking over a year ago, 1/4 ppd x 15-20 yrs; lives in [place]  All: NKDA      Prior Radiotherapy:  No    Current Systemic Treatment:  No     Presence of Pacemaker or ICD:  No    Past Medical History:    Past Medical History:   Diagnosis Date    Acute tonsillitis, unspecified 01/21/2016    Acute tonsillitis, unspecified etiology    Hypertrophy of tonsils 01/11/2017    Lingual tonsil hypertrophy    Hypertrophy of tonsils 01/05/2017    Tonsillar hypertrophy    Other specified health status     No pertinent past medical history    Pain in right toe(s) 09/14/2021    Great toe pain, right    Pain in unspecified ankle and joints of unspecified foot 09/15/2021    Ankle pain    Personal history of other diseases of the nervous system and sense organs 07/27/2021    History of obstructive sleep apnea    Personal history of other diseases of the respiratory system 07/27/2021    History of acute pharyngitis    Personal history of other diseases of the respiratory system 01/21/2016     History of sore throat    Personal history of other diseases of the respiratory system 2017    History of airway obstruction    Personal history of other specified conditions 2021    History of chest pain    Sprain of joints and ligaments of unspecified parts of neck, initial encounter 2021    Neck sprain    Sprain of unspecified ligament of right ankle, initial encounter 2017    Sprain of right ankle, unspecified ligament, initial encounter        Past Surgical History:    Past Surgical History:   Procedure Laterality Date    MOUTH SURGERY  2017    Oral Surgery Tooth Extraction        Family History:  Cancer-related family history is not on file.    Social History:    Social History     Tobacco Use    Smoking status: Former     Packs/day: 0.25     Years: 15.00     Additional pack years: 0.00     Total pack years: 3.75     Types: Cigarettes     Quit date:      Years since quittin.2    Smokeless tobacco: Never   Vaping Use    Vaping Use: Never used   Substance Use Topics    Alcohol use: Not Currently    Drug use: Yes     Types: Marijuana     Comment: Edibles       Allergies:  No Known Allergies     Medications:    Current Outpatient Medications:     allopurinol (Zyloprim) 100 mg tablet, Take 1 tablet (100 mg) by mouth once daily., Disp: 90 tablet, Rfl: 3    ascorbic acid (Vitamin C) 1,000 mg tablet, Take 1 tablet (1,000 mg) by mouth once daily., Disp: , Rfl:     baclofen (Lioresal) 5 mg tablet, Take 1 tablet (5 mg) by mouth 3 times a day for 20 days., Disp: 30 tablet, Rfl: 1    colchicine 0.6 mg tablet, Use 2 tbs at the sign of gout PO can repeat in 1 hr if still with pain X 1, Disp: 9 tablet, Rfl: 0    triamcinolone (Kenalog) 0.1 % cream, Apply topically 2 times a day. Apply to affected area 1-2 times daily as needed., Disp: 80 g, Rfl: 1      Review of Systems:  Review of Systems - Oncology  The patient's current pain level was assessed.  They report currently having a pain of 0  "out of 10.      Performance Status:  The Karnofsky performance scale today is 100, Fully active, able to carry on all pre-disease performed without restriction (ECOG equivalent 0).        OBJECTIVE  Physical Exam:  BP (!) 145/99   Pulse 89   Temp 36.9 °C (98.4 °F) (Temporal)   Resp 18   Ht 1.698 m (5' 6.85\")   Wt 76.6 kg (168 lb 14 oz)   SpO2 99%   BMI 26.57 kg/m²    Physical Exam   General: No acute distress, engaged in conversation, answers questions appropriately.  HEENT: Normocephalic, atraumatic. Extraocular movements are intact. Left base of tongue mass approaching midline, does not involve tonsilar pillars, tonsillar fossa, retromolar trigone.  Pulmonary: Breathing comfortably on room air, no respiratory distress.  Cardiovascular: Regular rate, no cyanosis, grossly well-perfused.  Abdomen: Soft, nontender, nondistended.   Extremities: Ambulatory without assistance, moves all extremities spontaneously.  Musculoskeletal: Normal range of motion.  Lymph: No lower extremity edema.  Neurologic: Alert and oriented x3.  Psych: Appropriate mood and behavior.    Flexible fiberoptic laryngopharyngoscopy was performed via the nares, after topical anesthesia and decongestant was instilled:    Nasopharynx: There are no noted lesions in the nasopharynx.  The bilateral torus tubarius and fossa of Rosenmuller show no lesions and eustachian tube orifice normal.  The mucosal surface of nasopharynx is clear without appreciated masses.  The nasopharyngeal surface of the palate has no abnormal lesions.  Oropharynx: There is an exophytic mass at the Left base of tongue without involvement of the pharyngeal wall. Mobile with tongue protrusion. No extension into the vallecula. The lingual surface of the epiglottis is normal.  Larynx: The laryngeal surface of epiglottis, the aryepiglottic folds, the false cords, and the arytenoids are clear of any lesions.  The vocal cords had normal mobility and there are no noted lesions.  " There are no lesions noted in the subglottis.  Hypopharynx: The hypopharynx appears normal.  There are no noted lesions of the piriform sinus or postcricoid area.    Scope was removed.  The patient tolerated the procedure well.      Laboratory Review:  There are no laboratory contraindications to radiation therapy.      Pathology Review:  The pertinent pathology results were reviewed and discussed with the patient.     Imaging:  The pertinent imaging results were reviewed and discussed with the patient.         ASSESSMENT:  39M with hx <10 pack years smoking presents with p16+ squamous cell carcinoma of the Left base of tongue, [id number] stage II. Of note, PET CT showed multiple hypermetabolic bilateral multilevel cervical lymphadenopathy, largest of which located at left cervical level 2, SUV max 4.3. Subcentimeter right level 1B cervical lymph node demonstrating SUV max of 3.7. Left level 4 cervical lymph node demonstrating SUV max of 3.2.    Patient doing well, ECOG 0.    Given suspicious bilateral cervical lympahdenopathy, we recommend definitive radiation therapy with concurrent chemotherapy. We discussed that his prognosis remains favorable given his low pack year smoking history and HPV-mediated tumor with 3 year overall survival of 93% [RTOG 0129, NEJM 2010]. We discussed the short- and long-term side effects and risks of radiation treatment, which include but are not limited to fatigue, mucositis, dysphagia, odynophagia, xerostomia, dermatitis, aspiration, persistent symptoms, neck fibrosis, trismus, osteoradionecrosis, hypothyroidism, need for temporary or permanent feeding tube. The patient demonstrated understanding and all questions were answered to satisfaction. The patient would like to proceed with radiation treatment, and signed consent today. We will proceed with CT simulation for radiation treatment planning today. We will place referrals for SLP and nutrition. He has an appointment to see his  dentist. He says he has history of developing long-standing hiccups with steroids-- will prescribe baclofen 5mg TID.    -CRT (35fx)  -CT sim today  -SLP referral  -nutrition referral  -dental clearance pending  -baclofen 5mg TID sent  -Neurontin 300 titrate up to TID to help with mild oropharyngeal pain

## 2024-04-04 NOTE — TUMOR BOARD NOTE
OTOLARYNGOLOGY - HEAD AND NECK SURGERY MULTIDISCIPLINARY TUMOR BOARD NOTE    Pilo Hanks is a 39 y.o. male who was presented at the multidisciplinary Head and Neck Tumor Board on 24.    Review of history:  He originally presented to Dr. Cruz on 3/15/24 with a left tongue mass. His exam showed a left base of tongue lesion which was exophytic and granular, about 1.5 cm in size.     PMH/PSH  -  Past Medical History:   Diagnosis Date    Acute tonsillitis, unspecified 2016    Acute tonsillitis, unspecified etiology    Hypertrophy of tonsils 2017    Lingual tonsil hypertrophy    Hypertrophy of tonsils 2017    Tonsillar hypertrophy    Other specified health status     No pertinent past medical history    Pain in right toe(s) 2021    Great toe pain, right    Pain in unspecified ankle and joints of unspecified foot 09/15/2021    Ankle pain    Personal history of other diseases of the nervous system and sense organs 2021    History of obstructive sleep apnea    Personal history of other diseases of the respiratory system 2021    History of acute pharyngitis    Personal history of other diseases of the respiratory system 2016    History of sore throat    Personal history of other diseases of the respiratory system 2017    History of airway obstruction    Personal history of other specified conditions 2021    History of chest pain    Sprain of joints and ligaments of unspecified parts of neck, initial encounter 2021    Neck sprain    Sprain of unspecified ligament of right ankle, initial encounter 2017    Sprain of right ankle, unspecified ligament, initial encounter         Social history:  Social History     Tobacco Use    Smoking status: Former     Packs/day: 0.25     Years: 15.00     Additional pack years: 0.00     Total pack years: 3.75     Types: Cigarettes     Quit date:      Years since quittin.2    Smokeless tobacco: Never   Vaping Use    Vaping  Use: Never used   Substance Use Topics    Alcohol use: Not Currently    Drug use: Yes     Types: Marijuana     Comment: Edibles       Workup:  1. Imaging:  CT neck: IMPRESSION:  Ill-defined soft tissue prominence within the left oropharynx  centered in the anterior tonsillar pillar/left base of tongue, may  measure approximately 1.8 cm, and likely corresponds to lesion  visualized on clinical exam. No involvement of the parapharyngeal   spaces or osseous involvement.    CT chest   IMPRESSION:  1. There is a 3 mm left lower lobe nodule and a 2-3 mm left upper  lobe nodule.  2. No evidence for lymphadenopathy. Probable hepatic cysts as  described.  3. Incidental note is made of residual thymic tissue within the  anterior mediastinum.  Soft tissue filling the vallecula is thought to represent lingual  tonsil.      Bilateral prominent cervical lymph nodes, largest in left level 2A  measuring 1.2 cm. No enlarged lymph nodes by size criteria.     PET  IMPRESSION:  1. Ill-defined large focal hypermetabolic soft tissue lesion centered  in the anterior tonsillar pillar/left base of tongue bulging into the  oropharynx, consistent with biopsy-proven squamous cell carcinoma.  2. Hypermetabolic focus at right tongue base, which could be  physiologic, although neoplastic disease can not be excluded.  3. Multiple hypermetabolic bilateral multilevel cervical  lymphadenopathy, largest of which located at left cervical level 2,  as detailed above, compatible with locoregional metastatic omer  disease.  4. No evidence of hypermetabolic distant metastatic disease.    2. Surgery: Biopsy in office (TORS canceled due to bilateral disease)     3. Pathology: P16 immunohistochemical stain shows the lesional cells to be positive.     P16 by immunohistochemistry:  Positive    Staging: T1N2M0 HPV+ OP SCCa    Tumor board recommendations:   Options: surgical resection vs. Chemoxrt vs clinical trials    Given his bilateral omer  disease, recommendation is chemorads, surgery canceled     @CRC@    Note:  Current national standards and recommendations, as well as review of the current literature, were included in the discussions that led to the recommendations above.

## 2024-04-05 ENCOUNTER — TUMOR BOARD CONFERENCE (OUTPATIENT)
Dept: HEMATOLOGY/ONCOLOGY | Facility: HOSPITAL | Age: 39
End: 2024-04-05
Payer: COMMERCIAL

## 2024-04-08 ENCOUNTER — TELEPHONE (OUTPATIENT)
Dept: RADIATION ONCOLOGY | Facility: HOSPITAL | Age: 39
End: 2024-04-08
Payer: COMMERCIAL

## 2024-04-08 ENCOUNTER — APPOINTMENT (OUTPATIENT)
Dept: SPEECH THERAPY | Facility: HOSPITAL | Age: 39
End: 2024-04-08
Payer: COMMERCIAL

## 2024-04-08 RX ORDER — HEPARIN SODIUM,PORCINE/PF 10 UNIT/ML
50 SYRINGE (ML) INTRAVENOUS AS NEEDED
Status: CANCELLED | OUTPATIENT
Start: 2024-04-08

## 2024-04-08 RX ORDER — HEPARIN 100 UNIT/ML
500 SYRINGE INTRAVENOUS AS NEEDED
Status: CANCELLED | OUTPATIENT
Start: 2024-04-08

## 2024-04-08 NOTE — PROGRESS NOTES
Patient ID: Pilo Hanks is a 39 y.o. male.  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 Phi Cruz  MedOnc:   Dr. Kyunghee Burkitt/Shaila Fitzgerald PA-C  RadOnc: Dr. Raquel Blackman    Current Therapy  4/16/24:     Sites of Disease  Left BOT  B/L cervical LNs    Oncologic Issues    ONCOLOGIC HISTORY  - 02/2024: Presented to PCP with mass in left tonsillar area. Referred to ENT  - 3/15/24: In office biopsy of left BOT w/ Dr Cruz.  Path showed p16 positive SCC  - 3/13/29/24: CT neck and chest showed a soft tissue mass within the left oropharynx centered in the anterior tonsillar pillar/left base of tongue, measures approximately 1.8 x 1.4cm.  - 4/1/24: PET/CT showed multiple hypermetabolic bilateral multilevel cervical lymphadenopathy, largest of which located at left cervical level 2, ~1.2cm.  - 4/5/24 [facility]: Rec surgical resection vs chemoRT vs clinical trials , [id number]    Past Medical History:   Past Medical History:  01/21/2016: Acute tonsillitis, unspecified      Comment:  Acute tonsillitis, unspecified etiology  01/11/2017: Hypertrophy of tonsils      Comment:  Lingual tonsil hypertrophy  01/05/2017: Hypertrophy of tonsils      Comment:  Tonsillar hypertrophy  No date: Other specified health status      Comment:  No pertinent past medical history  09/14/2021: Pain in right toe(s)      Comment:  Great toe pain, right  09/15/2021: Pain in unspecified ankle and joints of unspecified foot      Comment:  Ankle pain  07/27/2021: Personal history of other diseases of the nervous system   and sense organs      Comment:  History of obstructive sleep apnea  07/27/2021: Personal history of other diseases of the respiratory   system      Comment:  History of acute pharyngitis  01/21/2016: Personal history of other diseases of the respiratory   system      Comment:  History of sore throat  01/05/2017: Personal history  of other diseases of the respiratory   system      Comment:  History of airway obstruction  2021: Personal history of other specified conditions      Comment:  History of chest pain  2021: Sprain of joints and ligaments of unspecified parts of   neck, initial encounter      Comment:  Neck sprain  2017: Sprain of unspecified ligament of right ankle, initial   encounter      Comment:  Sprain of right ankle, unspecified ligament, initial                encounter   Surgical History:    Past Surgical History:   Procedure Laterality Date    MOUTH SURGERY  2017    Oral Surgery Tooth Extraction      Family History:    Family History   Problem Relation Name Age of Onset    No Known Problems Mother      No Known Problems Father      Diabetes Paternal Grandfather      Heart failure Paternal Grandfather       Family Oncology History:    Cancer-related family history is not on file.  Social History:    Social History     Tobacco Use    Smoking status: Former     Current packs/day: 0.00     Average packs/day: 0.3 packs/day for 15.0 years (3.8 ttl pk-yrs)     Types: Cigarettes     Start date:      Quit date:      Years since quittin.2    Smokeless tobacco: Never   Vaping Use    Vaping status: Never Used   Substance Use Topics    Alcohol use: Not Currently    Drug use: Yes     Types: Marijuana     Comment: Edibles          Subjective   Chief Complaint: Squamous Cell Carcinoma of left base of tongue    HPI  Interval History  Pilo Hanks is a 39 y.o. male with recent diagnosis of T1N2M0 P16+ Squamous Cell Carcinoma of left BOT. Who is started chemoRT w/ HD Cis on 2024.    Patient presents for C1 HD Cisplatin, reports the following:    He reports feeling very well, appetite is good, energy is stable.   Very mild throat discomfort, but denies odynophagia and dysphagia.  Does not feel left BOT mass has enlarged, denies changes in swallowing in the last 2 weeks.   Wt has been  stable.      ROS  Review of Systems   Constitutional:  Negative for chills and fever.   HENT:   Positive for lump/mass. Negative for hearing loss, mouth sores, nosebleeds, sore throat, tinnitus and trouble swallowing.    Respiratory:  Negative for cough and shortness of breath.    Cardiovascular:  Negative for chest pain and leg swelling.   Gastrointestinal:  Negative for abdominal pain, constipation, diarrhea, nausea and vomiting.   Musculoskeletal:  Negative for arthralgias.   Skin:  Negative for rash.   Neurological:  Negative for headaches, light-headedness and numbness.       Allergies  No Known Allergies     Medications  Current Outpatient Medications   Medication Instructions    allopurinol (ZYLOPRIM) 100 mg, oral, Daily    ascorbic acid (VITAMIN C) 1,000 mg, oral, Daily    baclofen (LIORESAL) 5 mg, oral, 3 times daily    colchicine 0.6 mg tablet Use 2 tbs at the sign of gout PO can repeat in 1 hr if still with pain X 1    dexAMETHasone (DECADRON) 8 mg, oral, Daily, For 3 days starting the day after treatment.    gabapentin (NEURONTIN) 300 mg, oral, 3 times daily    OLANZapine (ZYPREXA) 5 mg, oral, Nightly, For 4 days starting the evening of treatment.    ondansetron (ZOFRAN) 8 mg, oral, Every 8 hours PRN    prochlorperazine (COMPAZINE) 10 mg, oral, Every 6 hours PRN    triamcinolone (Kenalog) 0.1 % cream Topical, 2 times daily, Apply to affected area 1-2 times daily as needed.          Objective   VS: /81 (BP Location: Right arm)   Pulse 93   Temp 36.6 °C (97.9 °F) (Temporal)   Resp 18   Wt 75.2 kg (165 lb 12.8 oz)   SpO2 99%   BMI 26.76 kg/m²   Weight: Daily Weight  04/16/24 : 75.2 kg (165 lb 12.8 oz)  04/16/24 : 75.2 kg (165 lb 12.8 oz)  04/09/24 : 76.6 kg (168 lb 14 oz)  04/04/24 : 76.6 kg (168 lb 14 oz)  04/04/24 : 76.4 kg (168 lb 6.9 oz)  03/25/24 : 77.2 kg (170 lb 4.8 oz)  03/15/24 : 76.5 kg (168 lb 11.2 oz)      Physical Exam  Constitutional:       Appearance: Normal appearance.   HENT:       Head: Normocephalic and atraumatic.      Mouth/Throat:      Lips: Pink.      Mouth: Mucous membranes are moist.      Tongue: Lesions present.        Comments: Left BOT mass, ~2cm  Eyes:      Extraocular Movements: Extraocular movements intact.      Pupils: Pupils are equal, round, and reactive to light.   Cardiovascular:      Rate and Rhythm: Normal rate and regular rhythm.      Pulses: Normal pulses.   Pulmonary:      Effort: Pulmonary effort is normal.      Breath sounds: Normal breath sounds.   Abdominal:      General: Bowel sounds are normal.   Musculoskeletal:         General: Normal range of motion.   Skin:     General: Skin is warm.   Neurological:      General: No focal deficit present.      Mental Status: He is alert and oriented to person, place, and time.   Psychiatric:         Mood and Affect: Mood normal.       Diagnostic Results   Labs  Below labs are reviewed today.  Results from last 7 days   Lab Units 04/16/24  0827   WBC AUTO x10*3/uL 6.8   HEMOGLOBIN g/dL 14.4   HEMATOCRIT % 43.1   PLATELETS AUTO x10*3/uL 280   NEUTROS ABS x10*3/uL 3.93   LYMPHS ABS AUTO x10*3/uL 2.00   MONOS ABS AUTO x10*3/uL 0.87   EOS ABS AUTO x10*3/uL 0.01   NEUTROS PCT AUTO % 57.6   LYMPHS PCT AUTO % 29.2   MONOS PCT AUTO % 12.7   EOS PCT AUTO % 0.1      Results from last 7 days   Lab Units 04/16/24  0827   GLUCOSE mg/dL 96   SODIUM mmol/L 137   POTASSIUM mmol/L 3.8   CHLORIDE mmol/L 98   CO2 mmol/L 30   BUN mg/dL 21   CREATININE mg/dL 1.15   EGFR mL/min/1.73m*2 83   CALCIUM mg/dL 9.4   MAGNESIUM mg/dL 1.79   ALBUMIN g/dL 4.6   PROTEIN TOTAL g/dL 8.2   BILIRUBIN TOTAL mg/dL 0.4   ALK PHOS U/L 42   ALT U/L 8*   AST U/L 12                     Images  3/29/24 CT neck w/ contrast:   IMPRESSION:  Ill-defined soft tissue prominence within the left oropharynx centered in the anterior tonsillar pillar/left base of tongue, may measure approximately 1.8 cm, and likely corresponds to lesion visualized on clinical exam. No involvement  "of the parapharyngeal  spaces or osseous involvement.  Soft tissue filling the vallecula is thought to represent lingual tonsil.  Bilateral prominent cervical lymph nodes, largest in left level 2A measuring 1.2 cm. No enlarged lymph nodes by size criteria.     3/29/24 CT chest w/o contrast:   IMPRESSION:  1. There is a 3 mm left lower lobe nodule and a 2-3 mm left upper lobe nodule.  2. No evidence for lymphadenopathy. Probable hepatic cysts as described.  3. Incidental note is made of residual thymic tissue within the anterior mediastinum.     4/1/24 PET CT:  IMPRESSION:  1. Ill-defined large focal hypermetabolic soft tissue lesion centered in the anterior tonsillar pillar/left base of tongue bulging into the oropharynx, consistent with biopsy-proven squamous cell carcinoma.  2. Hypermetabolic focus at right tongue base, which could be physiologic, although neoplastic disease can not be excluded.  3. Multiple hypermetabolic bilateral multilevel cervical lymphadenopathy, largest of which located at left cervical level 2, as detailed above, compatible with locoregional metastatic omer  disease.  4. No evidence of hypermetabolic distant metastatic disease.    Pathology  No results found for: \"KBXHX\", \"PATHREP\", \"INTERP\", \"ADD1\", \"ADD2\", \"ADD3\", \"CORRECTIONHX\", \"ASTUDIES\", \"ANCILLARY1\", \"ANCILLARY2\", \"FINALINTERP\", \"COMDX\"      Assessment/Plan   Mr. Hanks is 40 y/o male with recent diagnosis of T2N2M0, P16+  left base of tongue squamous cell carcinoma who started chemoRT w/ HD Cis on 4/16/2024.    # Stage II (T1N2M0) p16 positive left base of tongue squamous cell carcinoma   - PET/CT showed uptake in left base of tongue, measures approximately 1.8 x 1.4cm as well as multiple hypermetabolic bilateral multilevel cervical lymphadenopathy, largest of which located at left cervical level 2, ~1.2cm.  - GFR >90 (3/25/24 labs), no hearing deficits  - Discussed with patient about SOC (concurrent chemorad with high " dose cisplatin) vs clinical trial (: High dose cisplatin vs weekly cis or [id number]: CRT followed by Nivolumab), patient might be interested in clinical trial, he will think about it over the weekend and let us know.  - 4/16/24: Patient started on chemoRT w/ HD Cisplatin. Feeling well, left tongue mass not subjectively increased in size. Mild dysphagia. Patient is deciding on 6 vs 7 weeks of RT and will get back to us. Patient is young (40yo) and has 3 children ages 3, 9 and 16 at home. Will refer him to Chaya Augustine NP for young adult services.        PLAN  -- Proceed to C1 HD Cis  -- IV Hydration Friday 4/19, will pre-schedule patient for IVF every Friday  -- RTC 1 week for tox check on 4/24.   -- Anti-emetics regimen reviewed with patient today, print out provided  -- Referral for young adult oncology services  (BRETT Augustine NP)

## 2024-04-09 ENCOUNTER — HOSPITAL ENCOUNTER (OUTPATIENT)
Dept: RADIATION ONCOLOGY | Facility: HOSPITAL | Age: 39
Setting detail: RADIATION/ONCOLOGY SERIES
Discharge: HOME | End: 2024-04-09
Payer: COMMERCIAL

## 2024-04-09 VITALS
BODY MASS INDEX: 27.14 KG/M2 | DIASTOLIC BLOOD PRESSURE: 87 MMHG | OXYGEN SATURATION: 97 % | TEMPERATURE: 97.3 F | HEIGHT: 66 IN | RESPIRATION RATE: 18 BRPM | HEART RATE: 87 BPM | WEIGHT: 168.87 LBS | SYSTOLIC BLOOD PRESSURE: 126 MMHG

## 2024-04-09 DIAGNOSIS — C10.9 MALIGNANT NEOPLASM OF OROPHARYNX (MULTI): Primary | ICD-10-CM

## 2024-04-09 RX ORDER — DEXAMETHASONE 4 MG/1
8 TABLET ORAL DAILY
Qty: 6 TABLET | Refills: 2 | Status: SHIPPED | OUTPATIENT
Start: 2024-04-09 | End: 2024-05-07 | Stop reason: SDUPTHER

## 2024-04-09 RX ORDER — PROCHLORPERAZINE EDISYLATE 5 MG/ML
10 INJECTION INTRAMUSCULAR; INTRAVENOUS EVERY 6 HOURS PRN
Status: CANCELLED | OUTPATIENT
Start: 2024-04-16

## 2024-04-09 RX ORDER — EPINEPHRINE 0.3 MG/.3ML
0.3 INJECTION SUBCUTANEOUS EVERY 5 MIN PRN
Status: CANCELLED | OUTPATIENT
Start: 2024-04-16

## 2024-04-09 RX ORDER — ONDANSETRON HYDROCHLORIDE 8 MG/1
8 TABLET, FILM COATED ORAL EVERY 8 HOURS PRN
Qty: 30 TABLET | Refills: 5 | Status: ON HOLD | OUTPATIENT
Start: 2024-04-09 | End: 2024-06-01

## 2024-04-09 RX ORDER — PALONOSETRON 0.05 MG/ML
0.25 INJECTION, SOLUTION INTRAVENOUS ONCE
Status: CANCELLED | OUTPATIENT
Start: 2024-04-16

## 2024-04-09 RX ORDER — OLANZAPINE 5 MG/1
5 TABLET ORAL NIGHTLY
Qty: 4 TABLET | Refills: 2 | Status: SHIPPED | OUTPATIENT
Start: 2024-04-09 | End: 2024-05-07 | Stop reason: SDUPTHER

## 2024-04-09 RX ORDER — DIPHENHYDRAMINE HYDROCHLORIDE 50 MG/ML
50 INJECTION INTRAMUSCULAR; INTRAVENOUS AS NEEDED
Status: CANCELLED | OUTPATIENT
Start: 2024-04-16

## 2024-04-09 RX ORDER — PROCHLORPERAZINE MALEATE 10 MG
10 TABLET ORAL EVERY 6 HOURS PRN
Qty: 30 TABLET | Refills: 5 | Status: SHIPPED | OUTPATIENT
Start: 2024-04-09 | End: 2024-06-01 | Stop reason: HOSPADM

## 2024-04-09 RX ORDER — FAMOTIDINE 10 MG/ML
20 INJECTION INTRAVENOUS ONCE AS NEEDED
Status: CANCELLED | OUTPATIENT
Start: 2024-04-16

## 2024-04-09 RX ORDER — ALBUTEROL SULFATE 0.83 MG/ML
3 SOLUTION RESPIRATORY (INHALATION) AS NEEDED
Status: CANCELLED | OUTPATIENT
Start: 2024-04-16

## 2024-04-09 RX ORDER — PROCHLORPERAZINE MALEATE 10 MG
10 TABLET ORAL EVERY 6 HOURS PRN
Status: CANCELLED | OUTPATIENT
Start: 2024-04-16

## 2024-04-12 ENCOUNTER — APPOINTMENT (OUTPATIENT)
Dept: SPEECH THERAPY | Facility: HOSPITAL | Age: 39
End: 2024-04-12
Payer: COMMERCIAL

## 2024-04-12 ENCOUNTER — EVALUATION (OUTPATIENT)
Dept: SPEECH THERAPY | Facility: HOSPITAL | Age: 39
End: 2024-04-12
Payer: COMMERCIAL

## 2024-04-12 DIAGNOSIS — R13.12 OROPHARYNGEAL DYSPHAGIA: Primary | ICD-10-CM

## 2024-04-12 PROCEDURE — 92610 EVALUATE SWALLOWING FUNCTION: CPT | Mod: GN

## 2024-04-12 NOTE — PROGRESS NOTES
Speech-Language Pathology    [facility] Adult Outpatient Speech-Language Pathology Clinical Swallow Evaluation    Patient Name: Pilo Hanks  MRN: [id number]  Today's Date: 4/12/2024   Time Calculation  Start Time: 0915  Stop Time: 0947  Time Calculation (min): 32 min         Reason for consult:  Mr. Hanks referred to SLP services for prophylactic intervention for oropharyngeal dysphagia secondary to radiation toxicities from head and neck cancer treatment. CT results revealed soft tissue prominence within the left oropharynx centered in the anterior tonsillar pillar/left base of tongue, may measure approximately 1.8 cm,     PMH: DAMON, tonsillitis.    Recommendations:  Continue regular diet with thin liquids.  Complete oral care frequently throughout the day.   Diet modifications as toxicities dictate.  Complete regimented prophylactic exercise program daily.   Follow up with SLP week 3-4 mid way through radiation.    The dysphagia history includes:      Dysphagia for solids               No    Dysphagia for liquids              No    Dysphagia for pills                  No  Associated weight loss            No    Recent pneumonia/bronchitis  No  GERD/Acid reflux   No    Functional Oral Intake Scale   FIOS level Comment    Level 1  Nothing by Mouth    Level 2 Tube dependent with minimal attempts of food and liquid.    Level 3 Tube dependent with consistent oral intake of food and liquid.    Level 4 Total oral diet of a single consistency.    Level 5 Total oral diet with multiple consistencies, but requires special preparations and compensations.    Level 6 Total oral diet with multiple consistencies without special preparations but specific food limitations.   X Level 7 Total oral diet with no restrictions.      Lingual ROM assessment scale (Lazarus 2014)   Protrusion  __X__ 100 Normal (Tongue protrudes >=15mm past upper lip margin)  ____ 50 Mild-mod impairment (Tongue protrudes >1 but <15mm)  ____ 25 Severely impaired (Some  movement but fails to reach upper lip margin)  ____ 0 Totally impaired (No movement)   Lateralization (each side)  ___X_ 100 Normal (Tongue touches corner of mouth)  ____ 50 Mild-mod impairment (<50% reduction in movement from corner of the mouth)  ____ 25 Severely impaired (>50% reduction in movement from corner of the mouth)  ____ 0 Totally impaired (No tongue movement in either direction)   Elevation  _X___ 100 Normal (Tongue tip contact with upper alveolar ridge)  ____ 50 Moderately impaired ( tip elevates but no contact with upper alveolar ridge)  ____ 0 Severely impaired (No visible tongue tip elevation)    Maximal Inter-incisor Distance _WNL_______    PHYSICAL EXAMINATION including ORAL MECHANISM  Trigeminal Nerve (V)     Jaw ROM:  Intact  Facial Nerve (VII)     Asymmetry at rest: Intact     Lip pucker: Intact     Smile: Intact     Lip/smile alternation: Intact  Glossopharyngeal, Vagus (IX, X)     Uvula at rest: Intact     Palate at rest: Intact     Palatal elevation /a/: Intact  Hypoglossal (XII)     Tongue at rest: Intact     Tongue protrusion: Intact     Side to side: Intact     Tongue elevation: Intact    Clinical Swallow Examination:  Pt consumed multiple cup/straw sips and 3 ounces of water via consecutive sips without overt s/s of airway invasion. Pt consumed additional PO trials of purees and solids with adequate oral prepping skills and clearance without overt s/s of airway invasion.    Plan: Patient to follow up with SLP for the following sessions to maintain function:  Mid way through radiation/ week 3 or 4. Next session Plan for the week of 5/4 or 5/15  Last week of radiation: BID  3-2 months: BID  6 months: BID  12 months: BID    Start date of tx: 4/12/24    Goals:  Pt will produce a series of lingual exercises/stretches independent of cues 10 reps, 3 times per day, 7 days a week; to maintain strength and motility of the lingual muscles and reduce the effects of radiation for safe and efficient  PO intake.  Pt will complete a series of jaw exercises/stretches independent of cues 3x a day, 7 days a week; to reduced the effects of radiation on the jaw muscles necessary to consume PO safely and efficiently.   Pt will complete a series of neck stretches independent of cues, 5 times a day for 7 days a week; to reduce the effects of radiation on the muscles necessary for optimal head position for safe and efficient PO intake.  Pt will complete effortful swallows 10 reps, 3 times per day for 7 days a week; to strengthen pharyngeal muscles for improve bolus clearance through the pharynx.        Education/Treatment: SLP reviewed the expected changes from radiation treatment to the mechanics of speech and swallowing function. Dental care, and oral hygiene in relation to aspiration discussed. As well as the additional prognostic indicators that can contribute to higher risks of development of aspiration pneumonia. Side effects of xerostomia with thick saliva, reduced appetite, oral sores, pain with swallowing with diminished taste and smell alleviations discussed. Handouts provided for referral resources at home. A prophylactic exercise program with handouts reviewed and demonstrated with the emphasis of initiating exercises to maintain strength, mobility and endurance of the speech and swallow mechanisms. Pt able to return demonstrate exercises with adequate skill and follow through.       Consultations/Referrals/Coordination of Services: n/a

## 2024-04-15 ENCOUNTER — HOSPITAL ENCOUNTER (OUTPATIENT)
Dept: RADIATION ONCOLOGY | Facility: HOSPITAL | Age: 39
Setting detail: RADIATION/ONCOLOGY SERIES
Discharge: HOME | End: 2024-04-15
Payer: COMMERCIAL

## 2024-04-15 DIAGNOSIS — C01 MALIGNANT NEOPLASM OF BASE OF TONGUE (MULTI): ICD-10-CM

## 2024-04-15 DIAGNOSIS — Z51.0 ENCOUNTER FOR ANTINEOPLASTIC RADIATION THERAPY: ICD-10-CM

## 2024-04-15 PROCEDURE — 77300 RADIATION THERAPY DOSE PLAN: CPT | Performed by: RADIOLOGY

## 2024-04-15 PROCEDURE — 77301 RADIOTHERAPY DOSE PLAN IMRT: CPT | Performed by: RADIOLOGY

## 2024-04-15 PROCEDURE — 77338 DESIGN MLC DEVICE FOR IMRT: CPT | Performed by: RADIOLOGY

## 2024-04-16 ENCOUNTER — RADIATION ONCOLOGY OTV (OUTPATIENT)
Dept: RADIATION ONCOLOGY | Facility: HOSPITAL | Age: 39
End: 2024-04-16

## 2024-04-16 ENCOUNTER — HOSPITAL ENCOUNTER (OUTPATIENT)
Dept: RADIATION ONCOLOGY | Facility: HOSPITAL | Age: 39
Setting detail: RADIATION/ONCOLOGY SERIES
Discharge: HOME | End: 2024-04-16
Payer: COMMERCIAL

## 2024-04-16 ENCOUNTER — INFUSION (OUTPATIENT)
Dept: HEMATOLOGY/ONCOLOGY | Facility: HOSPITAL | Age: 39
End: 2024-04-16
Payer: COMMERCIAL

## 2024-04-16 ENCOUNTER — OFFICE VISIT (OUTPATIENT)
Dept: HEMATOLOGY/ONCOLOGY | Facility: HOSPITAL | Age: 39
End: 2024-04-16
Payer: COMMERCIAL

## 2024-04-16 VITALS
RESPIRATION RATE: 18 BRPM | DIASTOLIC BLOOD PRESSURE: 79 MMHG | HEART RATE: 101 BPM | HEIGHT: 66 IN | SYSTOLIC BLOOD PRESSURE: 120 MMHG | OXYGEN SATURATION: 96 % | TEMPERATURE: 96.8 F | BODY MASS INDEX: 27.58 KG/M2 | WEIGHT: 171.6 LBS

## 2024-04-16 VITALS
DIASTOLIC BLOOD PRESSURE: 81 MMHG | WEIGHT: 165.8 LBS | SYSTOLIC BLOOD PRESSURE: 131 MMHG | TEMPERATURE: 97.9 F | RESPIRATION RATE: 18 BRPM | BODY MASS INDEX: 26.76 KG/M2 | OXYGEN SATURATION: 99 % | HEART RATE: 93 BPM

## 2024-04-16 VITALS
DIASTOLIC BLOOD PRESSURE: 81 MMHG | BODY MASS INDEX: 26.76 KG/M2 | RESPIRATION RATE: 18 BRPM | OXYGEN SATURATION: 99 % | WEIGHT: 165.8 LBS | HEART RATE: 93 BPM | TEMPERATURE: 97.9 F | SYSTOLIC BLOOD PRESSURE: 131 MMHG

## 2024-04-16 DIAGNOSIS — T45.1X5A CHEMOTHERAPY INDUCED NAUSEA AND VOMITING: ICD-10-CM

## 2024-04-16 DIAGNOSIS — C10.9 MALIGNANT NEOPLASM OF OROPHARYNX (MULTI): Primary | ICD-10-CM

## 2024-04-16 DIAGNOSIS — R11.2 CHEMOTHERAPY INDUCED NAUSEA AND VOMITING: ICD-10-CM

## 2024-04-16 DIAGNOSIS — R13.10 DYSPHAGIA, UNSPECIFIED TYPE: ICD-10-CM

## 2024-04-16 DIAGNOSIS — C10.9 MALIGNANT NEOPLASM OF OROPHARYNX (MULTI): ICD-10-CM

## 2024-04-16 DIAGNOSIS — Z51.11 ENCOUNTER FOR ANTINEOPLASTIC CHEMOTHERAPY: ICD-10-CM

## 2024-04-16 LAB
ALBUMIN SERPL BCP-MCNC: 4.6 G/DL (ref 3.4–5)
ALP SERPL-CCNC: 42 U/L (ref 33–120)
ALT SERPL W P-5'-P-CCNC: 8 U/L (ref 10–52)
ANION GAP SERPL CALC-SCNC: 13 MMOL/L (ref 10–20)
AST SERPL W P-5'-P-CCNC: 12 U/L (ref 9–39)
BASOPHILS # BLD AUTO: 0.01 X10*3/UL (ref 0–0.1)
BASOPHILS NFR BLD AUTO: 0.1 %
BILIRUB SERPL-MCNC: 0.4 MG/DL (ref 0–1.2)
BUN SERPL-MCNC: 21 MG/DL (ref 6–23)
CALCIUM SERPL-MCNC: 9.4 MG/DL (ref 8.6–10.3)
CHLORIDE SERPL-SCNC: 98 MMOL/L (ref 98–107)
CO2 SERPL-SCNC: 30 MMOL/L (ref 21–32)
CREAT SERPL-MCNC: 1.15 MG/DL (ref 0.5–1.3)
EGFRCR SERPLBLD CKD-EPI 2021: 83 ML/MIN/1.73M*2
EOSINOPHIL # BLD AUTO: 0.01 X10*3/UL (ref 0–0.7)
EOSINOPHIL NFR BLD AUTO: 0.1 %
ERYTHROCYTE [DISTWIDTH] IN BLOOD BY AUTOMATED COUNT: 12.6 % (ref 11.5–14.5)
GLUCOSE SERPL-MCNC: 96 MG/DL (ref 74–99)
HBV CORE AB SER QL: NONREACTIVE
HBV SURFACE AB SER-ACNC: 12 MIU/ML
HBV SURFACE AG SERPL QL IA: NONREACTIVE
HCT VFR BLD AUTO: 43.1 % (ref 41–52)
HGB BLD-MCNC: 14.4 G/DL (ref 13.5–17.5)
IMM GRANULOCYTES # BLD AUTO: 0.02 X10*3/UL (ref 0–0.7)
IMM GRANULOCYTES NFR BLD AUTO: 0.3 % (ref 0–0.9)
LYMPHOCYTES # BLD AUTO: 2 X10*3/UL (ref 1.2–4.8)
LYMPHOCYTES NFR BLD AUTO: 29.2 %
MAGNESIUM SERPL-MCNC: 1.79 MG/DL (ref 1.6–2.4)
MCH RBC QN AUTO: 29.1 PG (ref 26–34)
MCHC RBC AUTO-ENTMCNC: 33.4 G/DL (ref 32–36)
MCV RBC AUTO: 87 FL (ref 80–100)
MONOCYTES # BLD AUTO: 0.87 X10*3/UL (ref 0.1–1)
MONOCYTES NFR BLD AUTO: 12.7 %
NEUTROPHILS # BLD AUTO: 3.93 X10*3/UL (ref 1.2–7.7)
NEUTROPHILS NFR BLD AUTO: 57.6 %
NRBC BLD-RTO: 0 /100 WBCS (ref 0–0)
PLATELET # BLD AUTO: 280 X10*3/UL (ref 150–450)
POTASSIUM SERPL-SCNC: 3.8 MMOL/L (ref 3.5–5.3)
PROT SERPL-MCNC: 8.2 G/DL (ref 6.4–8.2)
RAD ONC MSQ ACTUAL FRACTIONS DELIVERED: 1
RAD ONC MSQ ACTUAL SESSION DELIVERED DOSE: 200 CGRAY
RAD ONC MSQ ACTUAL TOTAL DOSE: 200 CGRAY
RAD ONC MSQ ELAPSED DAYS: 0
RAD ONC MSQ LAST DATE: NORMAL
RAD ONC MSQ PRESCRIBED FRACTIONAL DOSE: 200 CGRAY
RAD ONC MSQ PRESCRIBED NUMBER OF FRACTIONS: 35
RAD ONC MSQ PRESCRIBED TECHNIQUE: NORMAL
RAD ONC MSQ PRESCRIBED TOTAL DOSE: 7000 CGRAY
RAD ONC MSQ PRESCRIPTION PATTERN COMMENT: NORMAL
RAD ONC MSQ START DATE: NORMAL
RAD ONC MSQ TREATMENT COURSE NUMBER: 1
RAD ONC MSQ TREATMENT SITE: NORMAL
RBC # BLD AUTO: 4.94 X10*6/UL (ref 4.5–5.9)
SODIUM SERPL-SCNC: 137 MMOL/L (ref 136–145)
WBC # BLD AUTO: 6.8 X10*3/UL (ref 4.4–11.3)

## 2024-04-16 PROCEDURE — 2500000004 HC RX 250 GENERAL PHARMACY W/ HCPCS (ALT 636 FOR OP/ED): Performed by: INTERNAL MEDICINE

## 2024-04-16 PROCEDURE — 87340 HEPATITIS B SURFACE AG IA: CPT

## 2024-04-16 PROCEDURE — 96367 TX/PROPH/DG ADDL SEQ IV INF: CPT

## 2024-04-16 PROCEDURE — 96413 CHEMO IV INFUSION 1 HR: CPT

## 2024-04-16 PROCEDURE — 99215 OFFICE O/P EST HI 40 MIN: CPT | Performed by: STUDENT IN AN ORGANIZED HEALTH CARE EDUCATION/TRAINING PROGRAM

## 2024-04-16 PROCEDURE — 77427 RADIATION TX MANAGEMENT X5: CPT | Performed by: RADIOLOGY

## 2024-04-16 PROCEDURE — 77386 HC INTENSITY-MODULATED RADIATION THERAPY (IMRT), COMPLEX: CPT | Performed by: RADIOLOGY

## 2024-04-16 PROCEDURE — 96361 HYDRATE IV INFUSION ADD-ON: CPT | Mod: INF

## 2024-04-16 PROCEDURE — 77014 CHG CT GUIDANCE RADIATION THERAPY FLDS PLACEMENT: CPT | Performed by: RADIOLOGY

## 2024-04-16 PROCEDURE — 96415 CHEMO IV INFUSION ADDL HR: CPT

## 2024-04-16 PROCEDURE — 85025 COMPLETE CBC W/AUTO DIFF WBC: CPT

## 2024-04-16 PROCEDURE — 96375 TX/PRO/DX INJ NEW DRUG ADDON: CPT | Mod: INF

## 2024-04-16 PROCEDURE — 84075 ASSAY ALKALINE PHOSPHATASE: CPT

## 2024-04-16 PROCEDURE — 86706 HEP B SURFACE ANTIBODY: CPT

## 2024-04-16 PROCEDURE — 86704 HEP B CORE ANTIBODY TOTAL: CPT

## 2024-04-16 PROCEDURE — 83735 ASSAY OF MAGNESIUM: CPT

## 2024-04-16 RX ORDER — HEPARIN 100 UNIT/ML
500 SYRINGE INTRAVENOUS AS NEEDED
Status: CANCELLED | OUTPATIENT
Start: 2024-04-16

## 2024-04-16 RX ORDER — PROCHLORPERAZINE EDISYLATE 5 MG/ML
10 INJECTION INTRAMUSCULAR; INTRAVENOUS EVERY 6 HOURS PRN
Status: DISCONTINUED | OUTPATIENT
Start: 2024-04-16 | End: 2024-04-16 | Stop reason: HOSPADM

## 2024-04-16 RX ORDER — HEPARIN SODIUM,PORCINE/PF 10 UNIT/ML
50 SYRINGE (ML) INTRAVENOUS AS NEEDED
Status: CANCELLED | OUTPATIENT
Start: 2024-04-16

## 2024-04-16 RX ORDER — PROCHLORPERAZINE MALEATE 10 MG
10 TABLET ORAL EVERY 6 HOURS PRN
Status: DISCONTINUED | OUTPATIENT
Start: 2024-04-16 | End: 2024-04-16 | Stop reason: HOSPADM

## 2024-04-16 RX ORDER — DIPHENHYDRAMINE HYDROCHLORIDE 50 MG/ML
50 INJECTION INTRAMUSCULAR; INTRAVENOUS AS NEEDED
Status: DISCONTINUED | OUTPATIENT
Start: 2024-04-16 | End: 2024-04-16 | Stop reason: HOSPADM

## 2024-04-16 RX ORDER — FAMOTIDINE 10 MG/ML
20 INJECTION INTRAVENOUS ONCE AS NEEDED
Status: DISCONTINUED | OUTPATIENT
Start: 2024-04-16 | End: 2024-04-16 | Stop reason: HOSPADM

## 2024-04-16 RX ORDER — ALBUTEROL SULFATE 0.83 MG/ML
3 SOLUTION RESPIRATORY (INHALATION) AS NEEDED
Status: DISCONTINUED | OUTPATIENT
Start: 2024-04-16 | End: 2024-04-16 | Stop reason: HOSPADM

## 2024-04-16 RX ORDER — PALONOSETRON 0.05 MG/ML
0.25 INJECTION, SOLUTION INTRAVENOUS ONCE
Status: COMPLETED | OUTPATIENT
Start: 2024-04-16 | End: 2024-04-16

## 2024-04-16 RX ORDER — EPINEPHRINE 0.3 MG/.3ML
0.3 INJECTION SUBCUTANEOUS EVERY 5 MIN PRN
Status: DISCONTINUED | OUTPATIENT
Start: 2024-04-16 | End: 2024-04-16 | Stop reason: HOSPADM

## 2024-04-16 RX ADMIN — SODIUM CHLORIDE 1000 ML: 9 INJECTION, SOLUTION INTRAVENOUS at 12:48

## 2024-04-16 RX ADMIN — MAGNESIUM SULFATE HEPTAHYDRATE 1000 ML/HR: 500 INJECTION, SOLUTION INTRAMUSCULAR; INTRAVENOUS at 08:40

## 2024-04-16 RX ADMIN — SODIUM CHLORIDE 150 MG: 9 INJECTION, SOLUTION INTRAVENOUS at 09:58

## 2024-04-16 RX ADMIN — PALONOSETRON HYDROCHLORIDE 250 MCG: 0.25 INJECTION INTRAVENOUS at 09:41

## 2024-04-16 RX ADMIN — CISPLATIN 189 MG: 1 INJECTION, SOLUTION INTRAVENOUS at 10:39

## 2024-04-16 RX ADMIN — DEXAMETHASONE SODIUM PHOSPHATE 12 MG: 10 INJECTION, SOLUTION INTRAMUSCULAR; INTRAVENOUS at 09:40

## 2024-04-16 ASSESSMENT — ENCOUNTER SYMPTOMS
ABDOMINAL PAIN: 0
SORE THROAT: 0
LIGHT-HEADEDNESS: 0
TROUBLE SWALLOWING: 0
FEVER: 0
HEADACHES: 0
LEG SWELLING: 0
NAUSEA: 0
CONSTIPATION: 0
SHORTNESS OF BREATH: 0
COUGH: 0
VOMITING: 0
NUMBNESS: 0
CHILLS: 0
DIARRHEA: 0
ARTHRALGIAS: 0

## 2024-04-16 ASSESSMENT — PAIN SCALES - GENERAL: PAINLEVEL: 0-NO PAIN

## 2024-04-16 NOTE — PROGRESS NOTES
Pt arrived to infusion for first dose of Cisplatin.  Pt was educated on safety at home, signs of infection, and what to do should he get a fever of 100.4 or greater.  Pt was given red chemo bag.  Pt verbalized understanding.  Pt was also educated on what to expect should he experience an infusion reaction.  All questions were answered and pt tolerated infusion.  Pt discharged in stable condition.

## 2024-04-16 NOTE — PROGRESS NOTES
"Radiation Oncology On Treatment Visit    Patient Name:  Pilo Hanks  MRN:  [id number]  :  1985    Referring Provider: No ref. provider found  Primary Care Provider: Ruth Villa DO  Care Team: Patient Care Team:  Ruth Villa DO as PCP - General  Ruth Villa DO as PCP - Nancy ORDAZ PCP  Raquel Blackman MD as Radiation Oncologist (Radiation Oncology)  Kyunghee Burkitt, DO as Consulting Physician (Hematology and Oncology)  Rosina Fitzgerald PA-C as Physician Assistant (Hematology and Oncology)    Date of Service: 2024     Diagnosis:   Specialty Problems          Radiation Oncology Problems    Malignant neoplasm of oropharynx (Multi)         Treatment Summary:  Radiation Treatments       Active   BOT + nodes (Started on 2024)   Most recent fraction: 200 cGy given on 2024   Total given: 200 cGy / 7,000 cGy  (1 of 35 fractions)   Elapsed Days: 0   Technique: VMAT           Completed   No historical radiation treatments to show.             SUBJECTIVE: Patient reports doing well. Denies any pain. Denies any nausea. Denies any symptoms at this time.  Had some hiccups during chemo that were partially helped with baclofen 10 TID.     OBJECTIVE:   Vital Signs:  /79   Pulse 101   Temp 36 °C (96.8 °F) (Temporal)   Resp 18   Ht 1.676 m (5' 6\")   Wt 77.8 kg (171 lb 9.6 oz)   SpO2 96%   BMI 27.70 kg/m²    Pain Scale: The patient's current pain level was assessed.  They report currently having a pain of 0 out of 10.    Other Pertinent Findings:   Skin: no changes, no mucositis,     Toxicity Assessment          2024    15:06   Toxicity Assessment   Adverse Events Reviewed (WDL) No (Exceptions to WDL)   Treatment  and neck   Anorexia Grade 0       First weight during .6lbs   Dehydration Grade 0       oral intake of fluids WNL   Dermatitis Radiation Grade 0       creams provided   Fatigue Grade 0   Nausea Grade 0       Meds at home   Pain Grade 0       Gabapentin at home, " and started today   Vomiting Grade 0   Dysphagia Grade 0   Mucositis Oral Grade 0       Glutamine at home, already started 1 week prior to RT   Blurred Vision Grade 0   Dry Mouth Grade 0   Ear Pain Grade 0   Tinnitus Grade 0   Oral Pain Grade 0   Aspiration Grade 0   Hoarseness Grade 0   Voice Alteration Grade 0        Assessment / Plan:  The patient is tolerating radiation therapy as anticipated.  Continue per current treatment plan.     Reviewed with patient their Symptom Management Form for Head and Neck Cancer:    Oropharyngeal cancer: concurrent bolus cisplatin, received first cycle on 4/16/24. Labs today wnl. Will plan to treat 6 fractions per week per RTOG 1016.    FEN: patient's weight is stable. Using liquid supplements. Aim for >2000 calories and >60grams of protein daily. Will see a dietician for more specific recommendations.  All diet is oral and no feeding tube.  Met with SLP prior to starting RT and doing daily exercises to maintain swallowing function.    Nausea: Reviewed use of Dexamethasone and Olanzapine after chemo each week to prevent nausea and Compazine/Zofran PRN for breakthrough nausea    Pain control: Patient denies any pain. Try to avoid Ibuprofen as can cause gastritis/renal insufficiency, which can also be exacerbated by ongoing chemo.    Oral Care: Patient will continue fluoride trays per dentist. Patient is using Glutamine 10g TID on days of RT to promote ongoing healing of mucositis. No need currently for Guaifenesin for thick saliva or Xylimelt for dry mouth    Skin Care: Continue Aquaphor within the RT field. Reviewed how to apply and to avoid within 3 hours prior to RT.     Diarrhea/Constipation: Reviewed use of Immodium for diarrhea. Reviewed use of Miralax or Senna if develops constipation.

## 2024-04-17 ENCOUNTER — APPOINTMENT (OUTPATIENT)
Dept: RADIATION ONCOLOGY | Facility: HOSPITAL | Age: 39
End: 2024-04-17
Payer: COMMERCIAL

## 2024-04-17 ENCOUNTER — HOSPITAL ENCOUNTER (OUTPATIENT)
Dept: RADIATION ONCOLOGY | Facility: HOSPITAL | Age: 39
Setting detail: RADIATION/ONCOLOGY SERIES
Discharge: HOME | End: 2024-04-17
Payer: COMMERCIAL

## 2024-04-17 DIAGNOSIS — Z51.0 ENCOUNTER FOR ANTINEOPLASTIC RADIATION THERAPY: ICD-10-CM

## 2024-04-17 DIAGNOSIS — C01 MALIGNANT NEOPLASM OF BASE OF TONGUE (MULTI): ICD-10-CM

## 2024-04-17 LAB
RAD ONC MSQ ACTUAL FRACTIONS DELIVERED: 2
RAD ONC MSQ ACTUAL SESSION DELIVERED DOSE: 200 CGRAY
RAD ONC MSQ ACTUAL TOTAL DOSE: 400 CGRAY
RAD ONC MSQ ELAPSED DAYS: 1
RAD ONC MSQ LAST DATE: NORMAL
RAD ONC MSQ PRESCRIBED FRACTIONAL DOSE: 200 CGRAY
RAD ONC MSQ PRESCRIBED NUMBER OF FRACTIONS: 35
RAD ONC MSQ PRESCRIBED TECHNIQUE: NORMAL
RAD ONC MSQ PRESCRIBED TOTAL DOSE: 7000 CGRAY
RAD ONC MSQ PRESCRIPTION PATTERN COMMENT: NORMAL
RAD ONC MSQ START DATE: NORMAL
RAD ONC MSQ TREATMENT COURSE NUMBER: 1
RAD ONC MSQ TREATMENT SITE: NORMAL

## 2024-04-17 PROCEDURE — 77386 HC INTENSITY-MODULATED RADIATION THERAPY (IMRT), COMPLEX: CPT | Performed by: RADIOLOGY

## 2024-04-17 PROCEDURE — 77336 RADIATION PHYSICS CONSULT: CPT | Performed by: RADIOLOGY

## 2024-04-17 PROCEDURE — 77014 CHG CT GUIDANCE RADIATION THERAPY FLDS PLACEMENT: CPT | Performed by: RADIOLOGY

## 2024-04-18 ENCOUNTER — HOSPITAL ENCOUNTER (OUTPATIENT)
Dept: RADIATION ONCOLOGY | Facility: HOSPITAL | Age: 39
Setting detail: RADIATION/ONCOLOGY SERIES
Discharge: HOME | End: 2024-04-18
Payer: COMMERCIAL

## 2024-04-18 ENCOUNTER — APPOINTMENT (OUTPATIENT)
Dept: RADIATION ONCOLOGY | Facility: HOSPITAL | Age: 39
End: 2024-04-18
Payer: COMMERCIAL

## 2024-04-18 DIAGNOSIS — Z51.0 ENCOUNTER FOR ANTINEOPLASTIC RADIATION THERAPY: ICD-10-CM

## 2024-04-18 DIAGNOSIS — C10.9 MALIGNANT NEOPLASM OF OROPHARYNX (MULTI): ICD-10-CM

## 2024-04-18 DIAGNOSIS — T45.1X5A CHEMOTHERAPY INDUCED DIARRHEA: Primary | ICD-10-CM

## 2024-04-18 DIAGNOSIS — C01 MALIGNANT NEOPLASM OF BASE OF TONGUE (MULTI): ICD-10-CM

## 2024-04-18 DIAGNOSIS — K52.1 CHEMOTHERAPY INDUCED DIARRHEA: Primary | ICD-10-CM

## 2024-04-18 LAB
RAD ONC MSQ ACTUAL FRACTIONS DELIVERED: 3
RAD ONC MSQ ACTUAL FRACTIONS DELIVERED: 4
RAD ONC MSQ ACTUAL SESSION DELIVERED DOSE: 200 CGRAY
RAD ONC MSQ ACTUAL SESSION DELIVERED DOSE: 200 CGRAY
RAD ONC MSQ ACTUAL TOTAL DOSE: 600 CGRAY
RAD ONC MSQ ACTUAL TOTAL DOSE: 800 CGRAY
RAD ONC MSQ ELAPSED DAYS: 2
RAD ONC MSQ ELAPSED DAYS: 2
RAD ONC MSQ LAST DATE: NORMAL
RAD ONC MSQ LAST DATE: NORMAL
RAD ONC MSQ PRESCRIBED FRACTIONAL DOSE: 200 CGRAY
RAD ONC MSQ PRESCRIBED FRACTIONAL DOSE: 200 CGRAY
RAD ONC MSQ PRESCRIBED NUMBER OF FRACTIONS: 35
RAD ONC MSQ PRESCRIBED NUMBER OF FRACTIONS: 35
RAD ONC MSQ PRESCRIBED TECHNIQUE: NORMAL
RAD ONC MSQ PRESCRIBED TECHNIQUE: NORMAL
RAD ONC MSQ PRESCRIBED TOTAL DOSE: 7000 CGRAY
RAD ONC MSQ PRESCRIBED TOTAL DOSE: 7000 CGRAY
RAD ONC MSQ PRESCRIPTION PATTERN COMMENT: NORMAL
RAD ONC MSQ PRESCRIPTION PATTERN COMMENT: NORMAL
RAD ONC MSQ START DATE: NORMAL
RAD ONC MSQ START DATE: NORMAL
RAD ONC MSQ TREATMENT COURSE NUMBER: 1
RAD ONC MSQ TREATMENT COURSE NUMBER: 1
RAD ONC MSQ TREATMENT SITE: NORMAL
RAD ONC MSQ TREATMENT SITE: NORMAL

## 2024-04-18 PROCEDURE — 77014 CHG CT GUIDANCE RADIATION THERAPY FLDS PLACEMENT: CPT | Performed by: RADIOLOGY

## 2024-04-18 PROCEDURE — 77386 HC INTENSITY-MODULATED RADIATION THERAPY (IMRT), COMPLEX: CPT | Mod: XE | Performed by: RADIOLOGY

## 2024-04-18 PROCEDURE — 77386 HC INTENSITY-MODULATED RADIATION THERAPY (IMRT), COMPLEX: CPT | Performed by: RADIOLOGY

## 2024-04-18 RX ORDER — LOPERAMIDE HYDROCHLORIDE 2 MG/1
2 CAPSULE ORAL 4 TIMES DAILY PRN
Qty: 60 CAPSULE | Refills: 2 | Status: SHIPPED | OUTPATIENT
Start: 2024-04-18 | End: 2024-06-01 | Stop reason: HOSPADM

## 2024-04-19 ENCOUNTER — HOSPITAL ENCOUNTER (OUTPATIENT)
Dept: RADIATION ONCOLOGY | Facility: HOSPITAL | Age: 39
Setting detail: RADIATION/ONCOLOGY SERIES
Discharge: HOME | End: 2024-04-19
Payer: COMMERCIAL

## 2024-04-19 ENCOUNTER — APPOINTMENT (OUTPATIENT)
Dept: RADIATION ONCOLOGY | Facility: HOSPITAL | Age: 39
End: 2024-04-19
Payer: COMMERCIAL

## 2024-04-19 ENCOUNTER — LAB (OUTPATIENT)
Dept: LAB | Facility: HOSPITAL | Age: 39
End: 2024-04-19
Payer: COMMERCIAL

## 2024-04-19 ENCOUNTER — INFUSION (OUTPATIENT)
Dept: HEMATOLOGY/ONCOLOGY | Facility: HOSPITAL | Age: 39
End: 2024-04-19
Payer: COMMERCIAL

## 2024-04-19 VITALS
WEIGHT: 167.33 LBS | BODY MASS INDEX: 27.01 KG/M2 | TEMPERATURE: 97.3 F | HEART RATE: 86 BPM | OXYGEN SATURATION: 98 % | SYSTOLIC BLOOD PRESSURE: 141 MMHG | DIASTOLIC BLOOD PRESSURE: 70 MMHG | RESPIRATION RATE: 18 BRPM

## 2024-04-19 DIAGNOSIS — Z51.0 ENCOUNTER FOR ANTINEOPLASTIC RADIATION THERAPY: ICD-10-CM

## 2024-04-19 DIAGNOSIS — C10.9 MALIGNANT NEOPLASM OF OROPHARYNX (MULTI): ICD-10-CM

## 2024-04-19 DIAGNOSIS — C01 MALIGNANT NEOPLASM OF BASE OF TONGUE (MULTI): ICD-10-CM

## 2024-04-19 DIAGNOSIS — Z00.00 WELLNESS EXAMINATION: ICD-10-CM

## 2024-04-19 LAB
ALBUMIN SERPL BCP-MCNC: 4.5 G/DL (ref 3.4–5)
ALP SERPL-CCNC: 37 U/L (ref 33–120)
ALT SERPL W P-5'-P-CCNC: 61 U/L (ref 10–52)
ANION GAP SERPL CALC-SCNC: 15 MMOL/L (ref 10–20)
AST SERPL W P-5'-P-CCNC: 40 U/L (ref 9–39)
BASOPHILS # BLD AUTO: 0.01 X10*3/UL (ref 0–0.1)
BASOPHILS NFR BLD AUTO: 0.1 %
BILIRUB SERPL-MCNC: 0.5 MG/DL (ref 0–1.2)
BUN SERPL-MCNC: 40 MG/DL (ref 6–23)
CALCIUM SERPL-MCNC: 8.9 MG/DL (ref 8.6–10.3)
CHLORIDE SERPL-SCNC: 97 MMOL/L (ref 98–107)
CHOLEST SERPL-MCNC: 201 MG/DL (ref 0–199)
CHOLESTEROL/HDL RATIO: 6.7
CO2 SERPL-SCNC: 27 MMOL/L (ref 21–32)
CREAT SERPL-MCNC: 1.53 MG/DL (ref 0.5–1.3)
EGFRCR SERPLBLD CKD-EPI 2021: 59 ML/MIN/1.73M*2
EOSINOPHIL # BLD AUTO: 0 X10*3/UL (ref 0–0.7)
EOSINOPHIL NFR BLD AUTO: 0 %
ERYTHROCYTE [DISTWIDTH] IN BLOOD BY AUTOMATED COUNT: 12.8 % (ref 11.5–14.5)
GLUCOSE SERPL-MCNC: 109 MG/DL (ref 74–99)
HCT VFR BLD AUTO: 39.7 % (ref 41–52)
HDLC SERPL-MCNC: 30.2 MG/DL
HGB BLD-MCNC: 13.4 G/DL (ref 13.5–17.5)
IMM GRANULOCYTES # BLD AUTO: 0.03 X10*3/UL (ref 0–0.7)
IMM GRANULOCYTES NFR BLD AUTO: 0.4 % (ref 0–0.9)
LDLC SERPL CALC-MCNC: 126 MG/DL
LYMPHOCYTES # BLD AUTO: 0.34 X10*3/UL (ref 1.2–4.8)
LYMPHOCYTES NFR BLD AUTO: 4.1 %
MAGNESIUM SERPL-MCNC: 2.04 MG/DL (ref 1.6–2.4)
MCH RBC QN AUTO: 29.1 PG (ref 26–34)
MCHC RBC AUTO-ENTMCNC: 33.8 G/DL (ref 32–36)
MCV RBC AUTO: 86 FL (ref 80–100)
MONOCYTES # BLD AUTO: 0.2 X10*3/UL (ref 0.1–1)
MONOCYTES NFR BLD AUTO: 2.4 %
NEUTROPHILS # BLD AUTO: 7.64 X10*3/UL (ref 1.2–7.7)
NEUTROPHILS NFR BLD AUTO: 93 %
NON HDL CHOLESTEROL: 171 MG/DL (ref 0–149)
NRBC BLD-RTO: 0 /100 WBCS (ref 0–0)
PLATELET # BLD AUTO: 283 X10*3/UL (ref 150–450)
POTASSIUM SERPL-SCNC: 3.9 MMOL/L (ref 3.5–5.3)
PROT SERPL-MCNC: 7.9 G/DL (ref 6.4–8.2)
RAD ONC MSQ ACTUAL FRACTIONS DELIVERED: 5
RAD ONC MSQ ACTUAL SESSION DELIVERED DOSE: 200 CGRAY
RAD ONC MSQ ACTUAL TOTAL DOSE: 1000 CGRAY
RAD ONC MSQ ELAPSED DAYS: 3
RAD ONC MSQ LAST DATE: NORMAL
RAD ONC MSQ PRESCRIBED FRACTIONAL DOSE: 200 CGRAY
RAD ONC MSQ PRESCRIBED NUMBER OF FRACTIONS: 35
RAD ONC MSQ PRESCRIBED TECHNIQUE: NORMAL
RAD ONC MSQ PRESCRIBED TOTAL DOSE: 7000 CGRAY
RAD ONC MSQ PRESCRIPTION PATTERN COMMENT: NORMAL
RAD ONC MSQ START DATE: NORMAL
RAD ONC MSQ TREATMENT COURSE NUMBER: 1
RAD ONC MSQ TREATMENT SITE: NORMAL
RBC # BLD AUTO: 4.6 X10*6/UL (ref 4.5–5.9)
SODIUM SERPL-SCNC: 135 MMOL/L (ref 136–145)
TRIGL SERPL-MCNC: 225 MG/DL (ref 0–149)
VLDL: 45 MG/DL (ref 0–40)
WBC # BLD AUTO: 8.2 X10*3/UL (ref 4.4–11.3)

## 2024-04-19 PROCEDURE — 83735 ASSAY OF MAGNESIUM: CPT

## 2024-04-19 PROCEDURE — 80061 LIPID PANEL: CPT

## 2024-04-19 PROCEDURE — 85025 COMPLETE CBC W/AUTO DIFF WBC: CPT

## 2024-04-19 PROCEDURE — 77014 CHG CT GUIDANCE RADIATION THERAPY FLDS PLACEMENT: CPT | Performed by: RADIOLOGY

## 2024-04-19 PROCEDURE — 2500000004 HC RX 250 GENERAL PHARMACY W/ HCPCS (ALT 636 FOR OP/ED): Performed by: STUDENT IN AN ORGANIZED HEALTH CARE EDUCATION/TRAINING PROGRAM

## 2024-04-19 PROCEDURE — 80053 COMPREHEN METABOLIC PANEL: CPT

## 2024-04-19 PROCEDURE — 36415 COLL VENOUS BLD VENIPUNCTURE: CPT

## 2024-04-19 PROCEDURE — 77386 HC INTENSITY-MODULATED RADIATION THERAPY (IMRT), COMPLEX: CPT | Performed by: RADIOLOGY

## 2024-04-19 PROCEDURE — 96360 HYDRATION IV INFUSION INIT: CPT | Mod: INF

## 2024-04-19 RX ORDER — HEPARIN 100 UNIT/ML
500 SYRINGE INTRAVENOUS AS NEEDED
Status: CANCELLED | OUTPATIENT
Start: 2024-04-19

## 2024-04-19 RX ORDER — HEPARIN SODIUM,PORCINE/PF 10 UNIT/ML
50 SYRINGE (ML) INTRAVENOUS AS NEEDED
Status: CANCELLED | OUTPATIENT
Start: 2024-04-19

## 2024-04-19 RX ADMIN — SODIUM CHLORIDE 1000 ML: 9 INJECTION, SOLUTION INTRAVENOUS at 15:45

## 2024-04-19 ASSESSMENT — PAIN SCALES - GENERAL: PAINLEVEL: 0-NO PAIN

## 2024-04-22 ENCOUNTER — HOSPITAL ENCOUNTER (OUTPATIENT)
Dept: RADIATION ONCOLOGY | Facility: HOSPITAL | Age: 39
Setting detail: RADIATION/ONCOLOGY SERIES
Discharge: HOME | End: 2024-04-22
Payer: COMMERCIAL

## 2024-04-22 ENCOUNTER — APPOINTMENT (OUTPATIENT)
Dept: RADIATION ONCOLOGY | Facility: HOSPITAL | Age: 39
End: 2024-04-22
Payer: COMMERCIAL

## 2024-04-22 DIAGNOSIS — C01 MALIGNANT NEOPLASM OF BASE OF TONGUE (MULTI): ICD-10-CM

## 2024-04-22 DIAGNOSIS — Z51.0 ENCOUNTER FOR ANTINEOPLASTIC RADIATION THERAPY: ICD-10-CM

## 2024-04-22 LAB
RAD ONC MSQ ACTUAL FRACTIONS DELIVERED: 6
RAD ONC MSQ ACTUAL SESSION DELIVERED DOSE: 200 CGRAY
RAD ONC MSQ ACTUAL TOTAL DOSE: 1200 CGRAY
RAD ONC MSQ ELAPSED DAYS: 6
RAD ONC MSQ LAST DATE: NORMAL
RAD ONC MSQ PRESCRIBED FRACTIONAL DOSE: 200 CGRAY
RAD ONC MSQ PRESCRIBED NUMBER OF FRACTIONS: 35
RAD ONC MSQ PRESCRIBED TECHNIQUE: NORMAL
RAD ONC MSQ PRESCRIBED TOTAL DOSE: 7000 CGRAY
RAD ONC MSQ PRESCRIPTION PATTERN COMMENT: NORMAL
RAD ONC MSQ START DATE: NORMAL
RAD ONC MSQ TREATMENT COURSE NUMBER: 1
RAD ONC MSQ TREATMENT SITE: NORMAL

## 2024-04-22 PROCEDURE — 77386 HC INTENSITY-MODULATED RADIATION THERAPY (IMRT), COMPLEX: CPT | Performed by: RADIOLOGY

## 2024-04-22 PROCEDURE — 77014 CHG CT GUIDANCE RADIATION THERAPY FLDS PLACEMENT: CPT | Performed by: RADIOLOGY

## 2024-04-23 ENCOUNTER — APPOINTMENT (OUTPATIENT)
Dept: RADIATION ONCOLOGY | Facility: HOSPITAL | Age: 39
End: 2024-04-23
Payer: COMMERCIAL

## 2024-04-23 ENCOUNTER — HOSPITAL ENCOUNTER (OUTPATIENT)
Dept: RADIATION ONCOLOGY | Facility: CLINIC | Age: 39
Setting detail: RADIATION/ONCOLOGY SERIES
Discharge: HOME | End: 2024-04-23
Payer: COMMERCIAL

## 2024-04-23 DIAGNOSIS — C01 MALIGNANT NEOPLASM OF BASE OF TONGUE (MULTI): ICD-10-CM

## 2024-04-23 DIAGNOSIS — Z51.0 ENCOUNTER FOR ANTINEOPLASTIC RADIATION THERAPY: ICD-10-CM

## 2024-04-23 LAB
RAD ONC MSQ ACTUAL FRACTIONS DELIVERED: 7
RAD ONC MSQ ACTUAL SESSION DELIVERED DOSE: 200 CGRAY
RAD ONC MSQ ACTUAL TOTAL DOSE: 1400 CGRAY
RAD ONC MSQ ELAPSED DAYS: 7
RAD ONC MSQ LAST DATE: NORMAL
RAD ONC MSQ PRESCRIBED FRACTIONAL DOSE: 200 CGRAY
RAD ONC MSQ PRESCRIBED NUMBER OF FRACTIONS: 35
RAD ONC MSQ PRESCRIBED TECHNIQUE: NORMAL
RAD ONC MSQ PRESCRIBED TOTAL DOSE: 7000 CGRAY
RAD ONC MSQ PRESCRIPTION PATTERN COMMENT: NORMAL
RAD ONC MSQ START DATE: NORMAL
RAD ONC MSQ TREATMENT COURSE NUMBER: 1
RAD ONC MSQ TREATMENT SITE: NORMAL

## 2024-04-23 PROCEDURE — 77386 HC INTENSITY-MODULATED RADIATION THERAPY (IMRT), COMPLEX: CPT | Performed by: RADIOLOGY

## 2024-04-23 PROCEDURE — 77014 CHG CT GUIDANCE RADIATION THERAPY FLDS PLACEMENT: CPT | Performed by: RADIOLOGY

## 2024-04-24 ENCOUNTER — APPOINTMENT (OUTPATIENT)
Dept: HEMATOLOGY/ONCOLOGY | Facility: HOSPITAL | Age: 39
End: 2024-04-24
Payer: COMMERCIAL

## 2024-04-24 ENCOUNTER — APPOINTMENT (OUTPATIENT)
Dept: RADIATION ONCOLOGY | Facility: HOSPITAL | Age: 39
End: 2024-04-24
Payer: COMMERCIAL

## 2024-04-24 ENCOUNTER — HOSPITAL ENCOUNTER (OUTPATIENT)
Dept: RADIATION ONCOLOGY | Facility: CLINIC | Age: 39
Setting detail: RADIATION/ONCOLOGY SERIES
Discharge: HOME | End: 2024-04-24
Payer: COMMERCIAL

## 2024-04-24 DIAGNOSIS — C10.9 MALIGNANT NEOPLASM OF OROPHARYNX (MULTI): Primary | ICD-10-CM

## 2024-04-24 DIAGNOSIS — Z51.0 ENCOUNTER FOR ANTINEOPLASTIC RADIATION THERAPY: ICD-10-CM

## 2024-04-24 DIAGNOSIS — C01 MALIGNANT NEOPLASM OF BASE OF TONGUE (MULTI): ICD-10-CM

## 2024-04-24 LAB
RAD ONC MSQ ACTUAL FRACTIONS DELIVERED: 8
RAD ONC MSQ ACTUAL SESSION DELIVERED DOSE: 200 CGRAY
RAD ONC MSQ ACTUAL TOTAL DOSE: 1600 CGRAY
RAD ONC MSQ ELAPSED DAYS: 8
RAD ONC MSQ LAST DATE: NORMAL
RAD ONC MSQ PRESCRIBED FRACTIONAL DOSE: 200 CGRAY
RAD ONC MSQ PRESCRIBED NUMBER OF FRACTIONS: 35
RAD ONC MSQ PRESCRIBED TECHNIQUE: NORMAL
RAD ONC MSQ PRESCRIBED TOTAL DOSE: 7000 CGRAY
RAD ONC MSQ PRESCRIPTION PATTERN COMMENT: NORMAL
RAD ONC MSQ START DATE: NORMAL
RAD ONC MSQ TREATMENT COURSE NUMBER: 1
RAD ONC MSQ TREATMENT SITE: NORMAL

## 2024-04-24 PROCEDURE — 77014 CHG CT GUIDANCE RADIATION THERAPY FLDS PLACEMENT: CPT | Performed by: RADIOLOGY

## 2024-04-24 PROCEDURE — 77386 HC INTENSITY-MODULATED RADIATION THERAPY (IMRT), COMPLEX: CPT | Performed by: RADIOLOGY

## 2024-04-24 PROCEDURE — 77336 RADIATION PHYSICS CONSULT: CPT | Performed by: RADIOLOGY

## 2024-04-25 ENCOUNTER — APPOINTMENT (OUTPATIENT)
Dept: UROLOGY | Facility: HOSPITAL | Age: 39
End: 2024-04-25
Payer: COMMERCIAL

## 2024-04-25 ENCOUNTER — RADIATION ONCOLOGY OTV (OUTPATIENT)
Dept: RADIATION ONCOLOGY | Facility: HOSPITAL | Age: 39
End: 2024-04-25

## 2024-04-25 ENCOUNTER — OFFICE VISIT (OUTPATIENT)
Dept: HEMATOLOGY/ONCOLOGY | Facility: HOSPITAL | Age: 39
End: 2024-04-25
Payer: COMMERCIAL

## 2024-04-25 ENCOUNTER — HOSPITAL ENCOUNTER (OUTPATIENT)
Dept: RADIATION ONCOLOGY | Facility: HOSPITAL | Age: 39
Setting detail: RADIATION/ONCOLOGY SERIES
Discharge: HOME | End: 2024-04-25
Payer: COMMERCIAL

## 2024-04-25 ENCOUNTER — NUTRITION (OUTPATIENT)
Dept: HEMATOLOGY/ONCOLOGY | Facility: HOSPITAL | Age: 39
End: 2024-04-25

## 2024-04-25 ENCOUNTER — APPOINTMENT (OUTPATIENT)
Dept: RADIATION ONCOLOGY | Facility: HOSPITAL | Age: 39
End: 2024-04-25
Payer: COMMERCIAL

## 2024-04-25 ENCOUNTER — LAB (OUTPATIENT)
Dept: LAB | Facility: HOSPITAL | Age: 39
End: 2024-04-25
Payer: COMMERCIAL

## 2024-04-25 ENCOUNTER — INFUSION (OUTPATIENT)
Dept: HEMATOLOGY/ONCOLOGY | Facility: HOSPITAL | Age: 39
End: 2024-04-25
Payer: COMMERCIAL

## 2024-04-25 VITALS
HEART RATE: 80 BPM | BODY MASS INDEX: 27.05 KG/M2 | SYSTOLIC BLOOD PRESSURE: 122 MMHG | RESPIRATION RATE: 18 BRPM | TEMPERATURE: 98.6 F | OXYGEN SATURATION: 100 % | DIASTOLIC BLOOD PRESSURE: 86 MMHG | WEIGHT: 167.6 LBS

## 2024-04-25 VITALS
DIASTOLIC BLOOD PRESSURE: 82 MMHG | OXYGEN SATURATION: 99 % | WEIGHT: 164.02 LBS | HEART RATE: 95 BPM | SYSTOLIC BLOOD PRESSURE: 117 MMHG | RESPIRATION RATE: 18 BRPM | BODY MASS INDEX: 26.47 KG/M2 | TEMPERATURE: 98.4 F

## 2024-04-25 DIAGNOSIS — C10.9 MALIGNANT NEOPLASM OF OROPHARYNX (MULTI): ICD-10-CM

## 2024-04-25 DIAGNOSIS — Z51.0 ENCOUNTER FOR ANTINEOPLASTIC RADIATION THERAPY: ICD-10-CM

## 2024-04-25 DIAGNOSIS — C01 MALIGNANT NEOPLASM OF BASE OF TONGUE (MULTI): ICD-10-CM

## 2024-04-25 LAB
ALBUMIN SERPL BCP-MCNC: 4.4 G/DL (ref 3.4–5)
ALP SERPL-CCNC: 39 U/L (ref 33–120)
ALT SERPL W P-5'-P-CCNC: 17 U/L (ref 10–52)
ANION GAP SERPL CALC-SCNC: 12 MMOL/L (ref 10–20)
AST SERPL W P-5'-P-CCNC: 12 U/L (ref 9–39)
BASOPHILS # BLD AUTO: 0.01 X10*3/UL (ref 0–0.1)
BASOPHILS NFR BLD AUTO: 0.2 %
BILIRUB SERPL-MCNC: 0.5 MG/DL (ref 0–1.2)
BUN SERPL-MCNC: 29 MG/DL (ref 6–23)
CALCIUM SERPL-MCNC: 9.2 MG/DL (ref 8.6–10.3)
CHLORIDE SERPL-SCNC: 95 MMOL/L (ref 98–107)
CO2 SERPL-SCNC: 32 MMOL/L (ref 21–32)
CREAT SERPL-MCNC: 1.48 MG/DL (ref 0.5–1.3)
EGFRCR SERPLBLD CKD-EPI 2021: 61 ML/MIN/1.73M*2
EOSINOPHIL # BLD AUTO: 0.07 X10*3/UL (ref 0–0.7)
EOSINOPHIL NFR BLD AUTO: 1.5 %
ERYTHROCYTE [DISTWIDTH] IN BLOOD BY AUTOMATED COUNT: 11.9 % (ref 11.5–14.5)
GLUCOSE SERPL-MCNC: 91 MG/DL (ref 74–99)
HCT VFR BLD AUTO: 35.3 % (ref 41–52)
HGB BLD-MCNC: 12 G/DL (ref 13.5–17.5)
IMM GRANULOCYTES # BLD AUTO: 0.02 X10*3/UL (ref 0–0.7)
IMM GRANULOCYTES NFR BLD AUTO: 0.4 % (ref 0–0.9)
LYMPHOCYTES # BLD AUTO: 0.51 X10*3/UL (ref 1.2–4.8)
LYMPHOCYTES NFR BLD AUTO: 11.2 %
MAGNESIUM SERPL-MCNC: 1.94 MG/DL (ref 1.6–2.4)
MCH RBC QN AUTO: 29.1 PG (ref 26–34)
MCHC RBC AUTO-ENTMCNC: 34 G/DL (ref 32–36)
MCV RBC AUTO: 86 FL (ref 80–100)
MONOCYTES # BLD AUTO: 0.59 X10*3/UL (ref 0.1–1)
MONOCYTES NFR BLD AUTO: 13 %
NEUTROPHILS # BLD AUTO: 3.35 X10*3/UL (ref 1.2–7.7)
NEUTROPHILS NFR BLD AUTO: 73.7 %
NRBC BLD-RTO: 0 /100 WBCS (ref 0–0)
PLATELET # BLD AUTO: 179 X10*3/UL (ref 150–450)
POTASSIUM SERPL-SCNC: 4.5 MMOL/L (ref 3.5–5.3)
PROT SERPL-MCNC: 7.5 G/DL (ref 6.4–8.2)
RAD ONC MSQ ACTUAL FRACTIONS DELIVERED: 9
RAD ONC MSQ ACTUAL SESSION DELIVERED DOSE: 200 CGRAY
RAD ONC MSQ ACTUAL TOTAL DOSE: 1800 CGRAY
RAD ONC MSQ ELAPSED DAYS: 9
RAD ONC MSQ LAST DATE: NORMAL
RAD ONC MSQ PRESCRIBED FRACTIONAL DOSE: 200 CGRAY
RAD ONC MSQ PRESCRIBED NUMBER OF FRACTIONS: 35
RAD ONC MSQ PRESCRIBED TECHNIQUE: NORMAL
RAD ONC MSQ PRESCRIBED TOTAL DOSE: 7000 CGRAY
RAD ONC MSQ PRESCRIPTION PATTERN COMMENT: NORMAL
RAD ONC MSQ START DATE: NORMAL
RAD ONC MSQ TREATMENT COURSE NUMBER: 1
RAD ONC MSQ TREATMENT SITE: NORMAL
RBC # BLD AUTO: 4.12 X10*6/UL (ref 4.5–5.9)
SODIUM SERPL-SCNC: 134 MMOL/L (ref 136–145)
WBC # BLD AUTO: 4.6 X10*3/UL (ref 4.4–11.3)

## 2024-04-25 PROCEDURE — 99214 OFFICE O/P EST MOD 30 MIN: CPT | Mod: 25 | Performed by: INTERNAL MEDICINE

## 2024-04-25 PROCEDURE — 77014 CHG CT GUIDANCE RADIATION THERAPY FLDS PLACEMENT: CPT | Performed by: RADIOLOGY

## 2024-04-25 PROCEDURE — 85025 COMPLETE CBC W/AUTO DIFF WBC: CPT

## 2024-04-25 PROCEDURE — 77427 RADIATION TX MANAGEMENT X5: CPT | Performed by: RADIOLOGY

## 2024-04-25 PROCEDURE — 80053 COMPREHEN METABOLIC PANEL: CPT

## 2024-04-25 PROCEDURE — 83735 ASSAY OF MAGNESIUM: CPT

## 2024-04-25 PROCEDURE — 77386 HC INTENSITY-MODULATED RADIATION THERAPY (IMRT), COMPLEX: CPT | Performed by: RADIOLOGY

## 2024-04-25 PROCEDURE — 2500000004 HC RX 250 GENERAL PHARMACY W/ HCPCS (ALT 636 FOR OP/ED): Performed by: INTERNAL MEDICINE

## 2024-04-25 PROCEDURE — 96360 HYDRATION IV INFUSION INIT: CPT | Mod: INF

## 2024-04-25 PROCEDURE — 99214 OFFICE O/P EST MOD 30 MIN: CPT | Performed by: INTERNAL MEDICINE

## 2024-04-25 PROCEDURE — 1036F TOBACCO NON-USER: CPT | Performed by: INTERNAL MEDICINE

## 2024-04-25 PROCEDURE — 36415 COLL VENOUS BLD VENIPUNCTURE: CPT

## 2024-04-25 RX ORDER — HEPARIN SODIUM,PORCINE/PF 10 UNIT/ML
50 SYRINGE (ML) INTRAVENOUS AS NEEDED
Status: CANCELLED | OUTPATIENT
Start: 2024-04-25

## 2024-04-25 RX ORDER — HEPARIN 100 UNIT/ML
500 SYRINGE INTRAVENOUS AS NEEDED
Status: CANCELLED | OUTPATIENT
Start: 2024-04-25

## 2024-04-25 RX ADMIN — SODIUM CHLORIDE 1000 ML: 9 INJECTION, SOLUTION INTRAVENOUS at 10:56

## 2024-04-25 ASSESSMENT — ENCOUNTER SYMPTOMS
TROUBLE SWALLOWING: 0
CONSTIPATION: 0
ABDOMINAL PAIN: 0
LEG SWELLING: 0
CHILLS: 0
NUMBNESS: 0
SHORTNESS OF BREATH: 0
LIGHT-HEADEDNESS: 0
COUGH: 0
FEVER: 0
VOMITING: 0
NAUSEA: 0
HEADACHES: 0
DIARRHEA: 0
SORE THROAT: 0
ARTHRALGIAS: 0

## 2024-04-25 ASSESSMENT — PAIN SCALES - GENERAL: PAINLEVEL: 0-NO PAIN

## 2024-04-25 NOTE — PROGRESS NOTES
"NUTRITION Follow-up NOTE    Nutrition Assessment     Reason for Visit:  Pilo Hanks is a 39 y.o. male who presents for Follow-up    Diagnosis: Squamous Cell Carcinoma of left base of tongue     Current Treatment: started chemoRT w/ HD Cis on 4/16/2024.     Subjective: Pt seen today in clinic. Endorses some taste changes, though nothing specific. Is still eating nearly the same amount. Reviewed taste changes.         4/16/2024     9:00 AM 4/16/2024     2:54 PM 4/16/2024     2:56 PM 4/19/2024     3:29 PM 4/25/2024     9:42 AM 4/25/2024    11:12 AM 4/25/2024    11:14 AM   Vitals   Systolic 131 128 120 141 117 117 122   Diastolic 81 86 79 70 82 79 86   Heart Rate 93 97 101 86 95 79 80   Temp 36.6 °C (97.9 °F) 36 °C (96.8 °F)  36.3 °C (97.3 °F) 36.9 °C (98.4 °F) 37 °C (98.6 °F)    Resp 18 18 18 18 18 18    Height (in)  1.676 m (5' 6\") 1.676 m (5' 6\")       Weight (lb) 165.8 171.6  167.33 164.02 167.6    BMI 26.76 kg/m2 27.7 kg/m2 27.7 kg/m2 27.01 kg/m2 26.47 kg/m2 27.05 kg/m2    BSA (m2) 1.87 m2 1.9 m2 1.9 m2 1.88 m2 1.86 m2 1.88 m2    Visit Report Report Report Report  Report Report Report       Wt Readings from Last 10 Encounters:   04/25/24 76 kg (167 lb 9.6 oz)   04/25/24 74.4 kg (164 lb 0.4 oz)   04/19/24 75.9 kg (167 lb 5.3 oz)   04/16/24 77.8 kg (171 lb 9.6 oz)   04/16/24 75.2 kg (165 lb 12.8 oz)   04/16/24 75.2 kg (165 lb 12.8 oz)   04/09/24 76.6 kg (168 lb 14 oz)   04/04/24 76.6 kg (168 lb 14 oz)   04/04/24 76.4 kg (168 lb 6.9 oz)   03/25/24 77.2 kg (170 lb 4.8 oz)     Weight Change:  Weight stable   Significant Weight Change: No    Lab Results   Component Value Date/Time    GLUCOSE 91 04/25/2024 0936     (L) 04/25/2024 0936    K 4.5 04/25/2024 0936    CL 95 (L) 04/25/2024 0936    CO2 32 04/25/2024 0936    ANIONGAP 12 04/25/2024 0936    BUN 29 (H) 04/25/2024 0936    CREATININE 1.48 (H) 04/25/2024 0936    EGFR 61 04/25/2024 0936    CALCIUM 9.2 04/25/2024 0936    ALBUMIN 4.4 04/25/2024 0936    ALKPHOS 39 " 04/25/2024 0936    PROT 7.5 04/25/2024 0936    AST 12 04/25/2024 0936    BILITOT 0.5 04/25/2024 0936    ALT 17 04/25/2024 0936     Lab Results   Component Value Date/Time    VITD25 13 (A) 08/18/2021 1050        Food And Nutrient Intake:  Current Intake: >75%  of estimated energy needs  GI Symptoms: Taste Changes   Length of symptoms: 1 week  Allergies: None  Intolerance: None  Appetite: Normal      Nutrition Focused Physical Exam Findings:  Performed/Deferred: Deferred as pt visually appears well-nourished with no signs of malnutrition    Malnutrition Present: No    ESTIMATED ENERGY NEEDS  Dosing weight: 76 kg  Calories per day: 0021-8085  determined by 25-30 kcal/kg  Protein (g) per day:  determined by 1.0-1.5 g/kg  Estimated fluid needs: 9290-3773 determined by 1 kcal/mL    NUTRITION DIAGNOSIS  Diagnosis Statement 1:  Diagnosis Status: Ongoing  Diagnosis : Predicted inadequate energy intake  related to  chronic condition  as evidenced by  concurrent chemoRT    NUTRITION INTERVENTION  -Implement calorie/protein boosting strategies  -Encourage adequate hydration  -Small/frequent meals/snacks  -Taste     Diet Education Materials: taste changes    MONITOR AND EVALUATION  Monitor and Evaluation: Oral intake and Weight trend    Need for follow-up: Next chemo         Readiness to Change : Excellent  Level of Understanding : Excellent  Anticipated Compliant : Excellent

## 2024-04-25 NOTE — PROGRESS NOTES
Patient arrived ambulatory to infusion for add-on treatment of IV hydration. Saw Dr. Burkitt in clinic prior. Tolerated fluids without issue. Aware of appt tomorrow for hydration. Discharged in stable condition.

## 2024-04-25 NOTE — PROGRESS NOTES
Patient ID: Pilo Hanks is a 39 y.o. male.  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 Phi Cruz  MedOnc:   Dr. Kyunghee Burkitt/Shaila Fitzgerald PA-C  RadOnc: Dr. Raquel Blackman    Current Therapy  4/16/24:     Sites of Disease  Left BOT  B/L cervical LNs    Oncologic Issues    ONCOLOGIC HISTORY  - 02/2024: Presented to PCP with mass in left tonsillar area. Referred to ENT  - 3/15/24: In office biopsy of left BOT w/ Dr Cruz.  Path showed p16 positive SCC  - 3/13/29/24: CT neck and chest showed a soft tissue mass within the left oropharynx centered in the anterior tonsillar pillar/left base of tongue, measures approximately 1.8 x 1.4cm.  - 4/1/24: PET/CT showed multiple hypermetabolic bilateral multilevel cervical lymphadenopathy, largest of which located at left cervical level 2, ~1.2cm.  - 4/5/24 [facility]: Rec surgical resection vs chemoRT vs clinical trials , [id number]    Past Medical History:   Past Medical History:  01/21/2016: Acute tonsillitis, unspecified      Comment:  Acute tonsillitis, unspecified etiology  01/11/2017: Hypertrophy of tonsils      Comment:  Lingual tonsil hypertrophy  01/05/2017: Hypertrophy of tonsils      Comment:  Tonsillar hypertrophy  No date: Other specified health status      Comment:  No pertinent past medical history  09/14/2021: Pain in right toe(s)      Comment:  Great toe pain, right  09/15/2021: Pain in unspecified ankle and joints of unspecified foot      Comment:  Ankle pain  07/27/2021: Personal history of other diseases of the nervous system   and sense organs      Comment:  History of obstructive sleep apnea  07/27/2021: Personal history of other diseases of the respiratory   system      Comment:  History of acute pharyngitis  01/21/2016: Personal history of other diseases of the respiratory   system      Comment:  History of sore throat  01/05/2017: Personal history  of other diseases of the respiratory   system      Comment:  History of airway obstruction  2021: Personal history of other specified conditions      Comment:  History of chest pain  2021: Sprain of joints and ligaments of unspecified parts of   neck, initial encounter      Comment:  Neck sprain  2017: Sprain of unspecified ligament of right ankle, initial   encounter      Comment:  Sprain of right ankle, unspecified ligament, initial                encounter   Surgical History:    Past Surgical History:   Procedure Laterality Date    MOUTH SURGERY  2017    Oral Surgery Tooth Extraction      Family History:    Family History   Problem Relation Name Age of Onset    No Known Problems Mother      No Known Problems Father      Diabetes Paternal Grandfather      Heart failure Paternal Grandfather       Family Oncology History:    Cancer-related family history is not on file.  Social History:    Social History     Tobacco Use    Smoking status: Former     Current packs/day: 0.00     Average packs/day: 0.3 packs/day for 15.0 years (3.8 ttl pk-yrs)     Types: Cigarettes     Start date:      Quit date:      Years since quittin.3    Smokeless tobacco: Never   Vaping Use    Vaping status: Never Used   Substance Use Topics    Alcohol use: Not Currently    Drug use: Yes     Types: Marijuana     Comment: Edibles          Subjective   Chief Complaint: Squamous Cell Carcinoma of left base of tongue    HPI  Interval History  Pilo Hanks is a 39 y.o. male with recent diagnosis of T1N2M0 P16+ Squamous Cell Carcinoma of left BOT. Who is started chemoRT w/ HD Cis on 2024.    Patient presents for tox check after C1 HD Cisplatin, reports the following:    He reports feeling very well, but experienced hiccups for few days, was taking baclofen but it deidnt help.  Once post chemo dex was comlated, hiccups stopped.  Denies nausea, vomiting, but admit having diarrhea x 3 (last Wed-Frid) and resolved  with one time dose of imodium  Sppetite is good, energy is stable.   Very mild throat discomfort, but denies odynophagia and dysphagia.  Does not feel left BOT mass has enlarged, denies changes in swallowing in the last 2 weeks.   Wt has been stable.      ROS  Review of Systems   Constitutional:  Negative for chills and fever.   HENT:   Positive for lump/mass. Negative for hearing loss, mouth sores, nosebleeds, sore throat, tinnitus and trouble swallowing.    Respiratory:  Negative for cough and shortness of breath.    Cardiovascular:  Negative for chest pain and leg swelling.   Gastrointestinal:  Negative for abdominal pain, constipation, diarrhea, nausea and vomiting.   Musculoskeletal:  Negative for arthralgias.   Skin:  Negative for rash.   Neurological:  Negative for headaches, light-headedness and numbness.       Allergies  No Known Allergies     Medications  Current Outpatient Medications   Medication Instructions    allopurinol (ZYLOPRIM) 100 mg, oral, Daily    ascorbic acid (VITAMIN C) 1,000 mg, oral, Daily    baclofen (LIORESAL) 5 mg, oral, 3 times daily    colchicine 0.6 mg tablet Use 2 tbs at the sign of gout PO can repeat in 1 hr if still with pain X 1    dexAMETHasone (DECADRON) 8 mg, oral, Daily, For 3 days starting the day after treatment.    gabapentin (NEURONTIN) 300 mg, oral, 3 times daily    loperamide (IMODIUM) 2 mg, oral, 4 times daily PRN, Initial: 4 mg, followed by 2 mg every 2 to 4 hours or after each loose stoo    OLANZapine (ZYPREXA) 5 mg, oral, Nightly, For 4 days starting the evening of treatment.    ondansetron (ZOFRAN) 8 mg, oral, Every 8 hours PRN    prochlorperazine (COMPAZINE) 10 mg, oral, Every 6 hours PRN    triamcinolone (Kenalog) 0.1 % cream Topical, 2 times daily, Apply to affected area 1-2 times daily as needed.          Objective   VS: /82   Pulse 95   Temp 36.9 °C (98.4 °F) (Core)   Resp 18   Wt 74.4 kg (164 lb 0.4 oz)   SpO2 99%   BMI 26.47 kg/m²   Weight: Daily  Weight  04/25/24 : 74.4 kg (164 lb 0.4 oz)  04/19/24 : 75.9 kg (167 lb 5.3 oz)  04/16/24 : 77.8 kg (171 lb 9.6 oz)  04/16/24 : 75.2 kg (165 lb 12.8 oz)  04/16/24 : 75.2 kg (165 lb 12.8 oz)  04/09/24 : 76.6 kg (168 lb 14 oz)  04/04/24 : 76.6 kg (168 lb 14 oz)      Physical Exam  Constitutional:       Appearance: Normal appearance.   HENT:      Head: Normocephalic and atraumatic.      Mouth/Throat:      Lips: Pink.      Mouth: Mucous membranes are moist.      Tongue: Lesions present.        Comments: Left BOT mass, ~2cm  Eyes:      Extraocular Movements: Extraocular movements intact.      Pupils: Pupils are equal, round, and reactive to light.   Cardiovascular:      Rate and Rhythm: Normal rate and regular rhythm.      Pulses: Normal pulses.   Pulmonary:      Effort: Pulmonary effort is normal.      Breath sounds: Normal breath sounds.   Abdominal:      General: Bowel sounds are normal.   Musculoskeletal:         General: Normal range of motion.   Skin:     General: Skin is warm.   Neurological:      General: No focal deficit present.      Mental Status: He is alert and oriented to person, place, and time.   Psychiatric:         Mood and Affect: Mood normal.         Diagnostic Results   Labs  Below labs are reviewed today.  Results from last 7 days   Lab Units 04/25/24  0936 04/19/24  1334   WBC AUTO x10*3/uL 4.6 8.2   HEMOGLOBIN g/dL 12.0* 13.4*   HEMATOCRIT % 35.3* 39.7*   PLATELETS AUTO x10*3/uL 179 283   NEUTROS ABS x10*3/uL 3.35 7.64   LYMPHS ABS AUTO x10*3/uL 0.51* 0.34*   MONOS ABS AUTO x10*3/uL 0.59 0.20   EOS ABS AUTO x10*3/uL 0.07 0.00   NEUTROS PCT AUTO % 73.7 93.0   LYMPHS PCT AUTO % 11.2 4.1   MONOS PCT AUTO % 13.0 2.4   EOS PCT AUTO % 1.5 0.0      Results from last 7 days   Lab Units 04/25/24  0936 04/19/24  1334   GLUCOSE mg/dL 91 109*   SODIUM mmol/L 134* 135*   POTASSIUM mmol/L 4.5 3.9   CHLORIDE mmol/L 95* 97*   CO2 mmol/L 32 27   BUN mg/dL 29* 40*   CREATININE mg/dL 1.48* 1.53*   EGFR mL/min/1.73m*2  "61 59*   CALCIUM mg/dL 9.2 8.9   MAGNESIUM mg/dL 1.94 2.04   ALBUMIN g/dL 4.4 4.5   PROTEIN TOTAL g/dL 7.5 7.9   BILIRUBIN TOTAL mg/dL 0.5 0.5   ALK PHOS U/L 39 37   ALT U/L 17 61*   AST U/L 12 40*                     Images  3/29/24 CT neck w/ contrast:   IMPRESSION:  Ill-defined soft tissue prominence within the left oropharynx centered in the anterior tonsillar pillar/left base of tongue, may measure approximately 1.8 cm, and likely corresponds to lesion visualized on clinical exam. No involvement of the parapharyngeal  spaces or osseous involvement.  Soft tissue filling the vallecula is thought to represent lingual tonsil.  Bilateral prominent cervical lymph nodes, largest in left level 2A measuring 1.2 cm. No enlarged lymph nodes by size criteria.     3/29/24 CT chest w/o contrast:   IMPRESSION:  1. There is a 3 mm left lower lobe nodule and a 2-3 mm left upper lobe nodule.  2. No evidence for lymphadenopathy. Probable hepatic cysts as described.  3. Incidental note is made of residual thymic tissue within the anterior mediastinum.     4/1/24 PET CT:  IMPRESSION:  1. Ill-defined large focal hypermetabolic soft tissue lesion centered in the anterior tonsillar pillar/left base of tongue bulging into the oropharynx, consistent with biopsy-proven squamous cell carcinoma.  2. Hypermetabolic focus at right tongue base, which could be physiologic, although neoplastic disease can not be excluded.  3. Multiple hypermetabolic bilateral multilevel cervical lymphadenopathy, largest of which located at left cervical level 2, as detailed above, compatible with locoregional metastatic omer  disease.  4. No evidence of hypermetabolic distant metastatic disease.    Pathology  No results found for: \"KBXHX\", \"PATHREP\", \"INTERP\", \"ADD1\", \"ADD2\", \"ADD3\", \"CORRECTIONHX\", \"ASTUDIES\", \"ANCILLARY1\", \"ANCILLARY2\", \"FINALINTERP\", \"COMDX\"      Assessment/Plan   Mr. Hanks is 40 y/o male with recent diagnosis of T2N2M0, P16+  " left base of tongue squamous cell carcinoma who started chemoRT w/ HD Cis on 4/16/2024.    # Stage II (T1N2M0) p16 positive left base of tongue squamous cell carcinoma   - PET/CT showed uptake in left base of tongue, measures approximately 1.8 x 1.4cm as well as multiple hypermetabolic bilateral multilevel cervical lymphadenopathy, largest of which located at left cervical level 2, ~1.2cm.  - GFR >90 (3/25/24 labs), no hearing deficits  - Discussed with patient about SOC (concurrent chemorad with high dose cisplatin) vs clinical trial (: High dose cisplatin vs weekly cis or [id number]: CRT followed by Nivolumab), patient might be interested in clinical trial, he will think about it over the weekend and let us know.  - 4/16/24: Patient started on chemoRT w/ HD Cisplatin. Feeling well, left tongue mass not subjectively increased in size. Mild dysphagia. Patient is deciding on 6 vs 7 weeks of RT and will get back to us. Patient is young (38yo) and has 3 children ages 3, 9 and 16 at home. Will refer him to Chaya Augustine NP for young adult services.    - 4/25/24: post chemo he experienced hiccups, likely from dex, has few episodes of diarrhea but resolved.  Otherwise, tolerated chemo well.  Cre 1.48 GFR 61 (baseline 73-83), improved from 4/19 labs, but will give 1L hydration today.      PLAN  -- IVF today and prescheduled IVF on every Friday  ---RTC in 2 weeks for C2 HD Cis  -- Anti-emetics regimen reviewed with patient today, print out provided  -- Referral for young adult oncology services  (BRETT Augustine, JOHN)

## 2024-04-26 ENCOUNTER — HOSPITAL ENCOUNTER (OUTPATIENT)
Dept: RADIATION ONCOLOGY | Facility: HOSPITAL | Age: 39
Setting detail: RADIATION/ONCOLOGY SERIES
Discharge: HOME | End: 2024-04-26
Payer: COMMERCIAL

## 2024-04-26 ENCOUNTER — APPOINTMENT (OUTPATIENT)
Dept: RADIATION ONCOLOGY | Facility: HOSPITAL | Age: 39
End: 2024-04-26
Payer: COMMERCIAL

## 2024-04-26 ENCOUNTER — INFUSION (OUTPATIENT)
Dept: HEMATOLOGY/ONCOLOGY | Facility: HOSPITAL | Age: 39
End: 2024-04-26
Payer: COMMERCIAL

## 2024-04-26 VITALS
SYSTOLIC BLOOD PRESSURE: 135 MMHG | TEMPERATURE: 97.9 F | OXYGEN SATURATION: 99 % | RESPIRATION RATE: 19 BRPM | DIASTOLIC BLOOD PRESSURE: 85 MMHG | BODY MASS INDEX: 26.74 KG/M2 | HEART RATE: 81 BPM | WEIGHT: 165.7 LBS

## 2024-04-26 DIAGNOSIS — C01 MALIGNANT NEOPLASM OF BASE OF TONGUE (MULTI): ICD-10-CM

## 2024-04-26 DIAGNOSIS — C10.9 MALIGNANT NEOPLASM OF OROPHARYNX (MULTI): ICD-10-CM

## 2024-04-26 DIAGNOSIS — Z51.0 ENCOUNTER FOR ANTINEOPLASTIC RADIATION THERAPY: ICD-10-CM

## 2024-04-26 LAB
RAD ONC MSQ ACTUAL FRACTIONS DELIVERED: 10
RAD ONC MSQ ACTUAL FRACTIONS DELIVERED: 11
RAD ONC MSQ ACTUAL SESSION DELIVERED DOSE: 200 CGRAY
RAD ONC MSQ ACTUAL SESSION DELIVERED DOSE: 200 CGRAY
RAD ONC MSQ ACTUAL TOTAL DOSE: 2000 CGRAY
RAD ONC MSQ ACTUAL TOTAL DOSE: 2200 CGRAY
RAD ONC MSQ ELAPSED DAYS: 10
RAD ONC MSQ ELAPSED DAYS: 10
RAD ONC MSQ LAST DATE: NORMAL
RAD ONC MSQ LAST DATE: NORMAL
RAD ONC MSQ PRESCRIBED FRACTIONAL DOSE: 200 CGRAY
RAD ONC MSQ PRESCRIBED FRACTIONAL DOSE: 200 CGRAY
RAD ONC MSQ PRESCRIBED NUMBER OF FRACTIONS: 35
RAD ONC MSQ PRESCRIBED NUMBER OF FRACTIONS: 35
RAD ONC MSQ PRESCRIBED TECHNIQUE: NORMAL
RAD ONC MSQ PRESCRIBED TECHNIQUE: NORMAL
RAD ONC MSQ PRESCRIBED TOTAL DOSE: 7000 CGRAY
RAD ONC MSQ PRESCRIBED TOTAL DOSE: 7000 CGRAY
RAD ONC MSQ PRESCRIPTION PATTERN COMMENT: NORMAL
RAD ONC MSQ PRESCRIPTION PATTERN COMMENT: NORMAL
RAD ONC MSQ START DATE: NORMAL
RAD ONC MSQ START DATE: NORMAL
RAD ONC MSQ TREATMENT COURSE NUMBER: 1
RAD ONC MSQ TREATMENT COURSE NUMBER: 1
RAD ONC MSQ TREATMENT SITE: NORMAL
RAD ONC MSQ TREATMENT SITE: NORMAL

## 2024-04-26 PROCEDURE — 77014 CHG CT GUIDANCE RADIATION THERAPY FLDS PLACEMENT: CPT | Performed by: STUDENT IN AN ORGANIZED HEALTH CARE EDUCATION/TRAINING PROGRAM

## 2024-04-26 PROCEDURE — 96360 HYDRATION IV INFUSION INIT: CPT | Mod: INF

## 2024-04-26 PROCEDURE — 77386 HC INTENSITY-MODULATED RADIATION THERAPY (IMRT), COMPLEX: CPT | Mod: XE | Performed by: RADIOLOGY

## 2024-04-26 PROCEDURE — 2500000004 HC RX 250 GENERAL PHARMACY W/ HCPCS (ALT 636 FOR OP/ED): Performed by: STUDENT IN AN ORGANIZED HEALTH CARE EDUCATION/TRAINING PROGRAM

## 2024-04-26 PROCEDURE — 77386 HC INTENSITY-MODULATED RADIATION THERAPY (IMRT), COMPLEX: CPT | Performed by: RADIOLOGY

## 2024-04-26 RX ADMIN — SODIUM CHLORIDE 1000 ML: 9 INJECTION, SOLUTION INTRAVENOUS at 10:48

## 2024-04-26 ASSESSMENT — ENCOUNTER SYMPTOMS
ABDOMINAL PAIN: 0
VOMITING: 0
NAUSEA: 0
LIGHT-HEADEDNESS: 0
CHILLS: 0
HEADACHES: 0
COUGH: 0
FEVER: 0
LEG SWELLING: 0
ARTHRALGIAS: 0
NUMBNESS: 0
SHORTNESS OF BREATH: 0

## 2024-04-26 ASSESSMENT — PAIN SCALES - GENERAL: PAINLEVEL: 0-NO PAIN

## 2024-04-26 NOTE — PROGRESS NOTES
Patient tolerated the infusion very well. No complains and discomfort verbalized. All queries and concerns addressed. Discharged to home in stable condition accompanied by significant others.

## 2024-04-26 NOTE — PROGRESS NOTES
Patient ID: Pilo Hanks is a 39 y.o. male.  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 Phi Cruz  MedOnc:   Dr. Kyunghee Burkitt/Shaila Fitzgerald PA-C  RadOnc: Dr. Raquel Blackman    Current Therapy  4/16/24:     Sites of Disease  Left BOT  B/L cervical LNs    Oncologic Issues    ONCOLOGIC HISTORY  - 02/2024: Presented to PCP with mass in left tonsillar area. Referred to ENT  - 3/15/24: In office biopsy of left BOT w/ Dr Cruz.  Path showed p16 positive SCC  - 3/13/29/24: CT neck and chest showed a soft tissue mass within the left oropharynx centered in the anterior tonsillar pillar/left base of tongue, measures approximately 1.8 x 1.4cm.  - 4/1/24: PET/CT showed multiple hypermetabolic bilateral multilevel cervical lymphadenopathy, largest of which located at left cervical level 2, ~1.2cm.  - 4/5/24 [facility]: Rec surgical resection vs chemoRT vs clinical trials , [id number]    Past Medical History:   Past Medical History:  01/21/2016: Acute tonsillitis, unspecified      Comment:  Acute tonsillitis, unspecified etiology  01/11/2017: Hypertrophy of tonsils      Comment:  Lingual tonsil hypertrophy  01/05/2017: Hypertrophy of tonsils      Comment:  Tonsillar hypertrophy  No date: Other specified health status      Comment:  No pertinent past medical history  09/14/2021: Pain in right toe(s)      Comment:  Great toe pain, right  09/15/2021: Pain in unspecified ankle and joints of unspecified foot      Comment:  Ankle pain  07/27/2021: Personal history of other diseases of the nervous system   and sense organs      Comment:  History of obstructive sleep apnea  07/27/2021: Personal history of other diseases of the respiratory   system      Comment:  History of acute pharyngitis  01/21/2016: Personal history of other diseases of the respiratory   system      Comment:  History of sore throat  01/05/2017: Personal history  of other diseases of the respiratory   system      Comment:  History of airway obstruction  2021: Personal history of other specified conditions      Comment:  History of chest pain  2021: Sprain of joints and ligaments of unspecified parts of   neck, initial encounter      Comment:  Neck sprain  2017: Sprain of unspecified ligament of right ankle, initial   encounter      Comment:  Sprain of right ankle, unspecified ligament, initial                encounter   Surgical History:    Past Surgical History:   Procedure Laterality Date    MOUTH SURGERY  2017    Oral Surgery Tooth Extraction      Family History:    Family History   Problem Relation Name Age of Onset    No Known Problems Mother      No Known Problems Father      Diabetes Paternal Grandfather      Heart failure Paternal Grandfather       Family Oncology History:    Cancer-related family history is not on file.  Social History:    Social History     Tobacco Use    Smoking status: Former     Current packs/day: 0.00     Average packs/day: 0.3 packs/day for 15.0 years (3.8 ttl pk-yrs)     Types: Cigarettes     Start date:      Quit date:      Years since quittin.3    Smokeless tobacco: Never   Vaping Use    Vaping status: Never Used   Substance Use Topics    Alcohol use: Not Currently    Drug use: Yes     Types: Marijuana     Comment: Edibles          Subjective   Chief Complaint: Squamous Cell Carcinoma of left base of tongue    HPI  Interval History  Pilo Hanks is a 39 y.o. male with recent diagnosis of T1N2M0 P16+ Squamous Cell Carcinoma of left BOT. Who is started chemoRT w/ HD Cis on 2024.    Patient presents for C2 HD Cisplatin, reports the following:    His energy is lower but is still working. Appetite is ok.   Has increased throat pain with swallowing, due to radiation. Currently taking Gabapentin 800mg TID, Oxy 5mg Q6hr. Has not been taking BMX prior to meals.   He endorse dysphagia, some coughing after  eating/drinking. Has exercises from [employer] that he is doing.    He's drinking Boost VHC 2-3 x per day to supplement intake  Denies any nausea /vomiting after chemo. Took Olanzapine, Dex, Compazine and Zofran prophylactically.   He had hiccups that did not resolve with baclofen.  Had diarrhea after chemo took Imodium but then became constipated. Last BM 3 days ago. Has Miralax for PRN use.   Had tinnitus following C1 but improved with time. Denies subjective hearing loss.      ROS  Review of Systems   Constitutional:  Negative for chills and fever.   HENT:   Positive for mouth sores, sore throat, tinnitus and trouble swallowing. Negative for hearing loss, lump/mass and nosebleeds.    Respiratory:  Negative for cough and shortness of breath.    Cardiovascular:  Negative for chest pain and leg swelling.   Gastrointestinal:  Positive for constipation. Negative for abdominal pain, nausea and vomiting.   Musculoskeletal:  Negative for arthralgias.   Skin:  Negative for rash.   Neurological:  Negative for headaches, light-headedness and numbness.       Allergies  No Known Allergies     Medications  Current Outpatient Medications   Medication Instructions    allopurinol (ZYLOPRIM) 100 mg, oral, Daily    ascorbic acid (VITAMIN C) 1,000 mg, oral, Daily    baclofen (LIORESAL) 5 mg, oral, 3 times daily    colchicine 0.6 mg tablet Use 2 tbs at the sign of gout PO can repeat in 1 hr if still with pain X 1    dexAMETHasone (DECADRON) 8 mg, oral, Daily, For 3 days starting the day after treatment.    gabapentin (NEURONTIN) 800 mg, oral, 3 times daily    loperamide (IMODIUM) 2 mg, oral, 4 times daily PRN, Initial: 4 mg, followed by 2 mg every 2 to 4 hours or after each loose stoo    OLANZapine (ZYPREXA) 5 mg, oral, Nightly, For 4 days starting the evening of treatment.    ondansetron (ZOFRAN) 8 mg, oral, Every 8 hours PRN    oxyCODONE (ROXICODONE) 5 mg, oral, Every 6 hours PRN    prochlorperazine (COMPAZINE) 10 mg, oral, Every 6 hours  PRN    triamcinolone (Kenalog) 0.1 % cream Topical, 2 times daily, Apply to affected area 1-2 times daily as needed.          Objective   VS: /73   Pulse 105   Temp 36 °C (96.8 °F) (Core)   Resp 18   Wt 72.4 kg (159 lb 9.8 oz)   SpO2 100%   BMI 25.76 kg/m²   Weight: Daily Weight  05/09/24 : 74.5 kg (164 lb 4.8 oz)  05/07/24 : 72.4 kg (159 lb 9.8 oz)  05/06/24 : 73.5 kg (162 lb)  05/03/24 : 72.3 kg (159 lb 6.3 oz)  04/29/24 : 74.3 kg (163 lb 14.4 oz)  04/26/24 : 75.2 kg (165 lb 11.2 oz)  04/25/24 : 76 kg (167 lb 9.6 oz)      Physical Exam  Constitutional:       Appearance: Normal appearance.   HENT:      Head: Normocephalic and atraumatic.      Mouth/Throat:      Lips: Pink.      Mouth: Mucous membranes are moist. Oral lesions present.        Comments: Left BOT mass no longer visible  Mucositis in left Posterior oropharynx   Eyes:      Extraocular Movements: Extraocular movements intact.      Pupils: Pupils are equal, round, and reactive to light.   Cardiovascular:      Rate and Rhythm: Normal rate and regular rhythm.      Pulses: Normal pulses.   Pulmonary:      Effort: Pulmonary effort is normal.      Breath sounds: Normal breath sounds.   Abdominal:      General: Bowel sounds are normal.   Musculoskeletal:         General: Normal range of motion.   Skin:     General: Skin is warm.   Neurological:      General: No focal deficit present.      Mental Status: He is alert and oriented to person, place, and time.   Psychiatric:         Mood and Affect: Mood normal.         Diagnostic Results   Labs  Below labs are reviewed today.  Results from last 7 days   Lab Units 05/07/24  0929 05/03/24  1517   WBC AUTO x10*3/uL 2.8* 2.2*   HEMOGLOBIN g/dL 10.9* 10.5*   HEMATOCRIT % 32.7* 31.0*   PLATELETS AUTO x10*3/uL 333 324   NEUTROS ABS x10*3/uL 1.76 1.43   LYMPHS ABS AUTO x10*3/uL 0.16* 0.25*   MONOS ABS AUTO x10*3/uL 0.63 0.39   EOS ABS AUTO x10*3/uL 0.20 0.14   NEUTROS PCT AUTO % 63.2 64.2   LYMPHS PCT AUTO % 5.8  11.2   MONOS PCT AUTO % 22.7 17.5   EOS PCT AUTO % 7.2 6.3      Results from last 7 days   Lab Units 05/07/24  0929 05/03/24  1517   GLUCOSE mg/dL 101* 92   SODIUM mmol/L 135* 135*   POTASSIUM mmol/L 4.3 4.2   CHLORIDE mmol/L 95* 96*   CO2 mmol/L 30 30   BUN mg/dL 21 25*   CREATININE mg/dL 1.30 1.38*   EGFR mL/min/1.73m*2 71 66   CALCIUM mg/dL 9.0 9.1   MAGNESIUM mg/dL 1.68 1.96   ALBUMIN g/dL 4.2 4.4   PROTEIN TOTAL g/dL 7.7 7.7   BILIRUBIN TOTAL mg/dL 0.3 0.3   ALK PHOS U/L 47 47   ALT U/L 8* 8*   AST U/L 10 11                     Images  3/29/24 CT neck w/ contrast:   IMPRESSION:  Ill-defined soft tissue prominence within the left oropharynx centered in the anterior tonsillar pillar/left base of tongue, may measure approximately 1.8 cm, and likely corresponds to lesion visualized on clinical exam. No involvement of the parapharyngeal  spaces or osseous involvement.  Soft tissue filling the vallecula is thought to represent lingual tonsil.  Bilateral prominent cervical lymph nodes, largest in left level 2A measuring 1.2 cm. No enlarged lymph nodes by size criteria.     3/29/24 CT chest w/o contrast:   IMPRESSION:  1. There is a 3 mm left lower lobe nodule and a 2-3 mm left upper lobe nodule.  2. No evidence for lymphadenopathy. Probable hepatic cysts as described.  3. Incidental note is made of residual thymic tissue within the anterior mediastinum.     4/1/24 PET CT:  IMPRESSION:  1. Ill-defined large focal hypermetabolic soft tissue lesion centered in the anterior tonsillar pillar/left base of tongue bulging into the oropharynx, consistent with biopsy-proven squamous cell carcinoma.  2. Hypermetabolic focus at right tongue base, which could be physiologic, although neoplastic disease can not be excluded.  3. Multiple hypermetabolic bilateral multilevel cervical lymphadenopathy, largest of which located at left cervical level 2, as detailed above, compatible with locoregional metastatic  "omer  disease.  4. No evidence of hypermetabolic distant metastatic disease.    Pathology  No results found for: \"KBXHX\", \"PATHREP\", \"INTERP\", \"ADD1\", \"ADD2\", \"ADD3\", \"CORRECTIONHX\", \"ASTUDIES\", \"ANCILLARY1\", \"ANCILLARY2\", \"FINALINTERP\", \"COMDX\"      Assessment/Plan   Mr. Hanks is 38 y/o male with recent diagnosis of T2N2M0, P16+  left base of tongue squamous cell carcinoma who started chemoRT w/ HD Cis on 4/16/2024.    # Stage II (T1N2M0) p16 positive left base of tongue squamous cell carcinoma   - PET/CT showed uptake in left base of tongue, measures approximately 1.8 x 1.4cm as well as multiple hypermetabolic bilateral multilevel cervical lymphadenopathy, largest of which located at left cervical level 2, ~1.2cm.  - GFR >90 (3/25/24 labs), no hearing deficits  - Discussed with patient about SOC (concurrent chemorad with high dose cisplatin) vs clinical trial (: High dose cisplatin vs weekly cis or [id number]: CRT followed by Nivolumab), patient might be interested in clinical trial, he will think about it over the weekend and let us know.  - 4/16/24: Patient started on chemoRT w/ HD Cisplatin. Feeling well, left tongue mass not subjectively increased in size. Mild dysphagia. Patient is deciding on 6 vs 7 weeks of RT and will get back to us. Patient is young (38yo) and has 3 children ages 3, 9 and 16 at home. Will refer him to Chaya Augustine NP for young adult services.    - 4/25/24: post chemo he experienced hiccups, likely from dex, has few episodes of diarrhea but resolved.  Otherwise, tolerated chemo well.  Cre 1.48 GFR 61 (baseline 73-83), improved from 4/19 labs, but will give 1L hydration today.  - 5/7/24: Patient tolerated C1 well but had some decrease in renal function, and hiccups that were not resolved with Baclofen. He had very good response so far as his left BOT mass is no longer visit. He does have mucositis in this area and has increasing odynophagia. Continue current pain regimen, continue Boost " VHC x2/day.    PLAN  -- Proceed to C2 HD Cis today  -- Continue IVF on every Friday and additional IVF next Monday for hydration support.   -- RTC 1 week for Tox Check w/ MedOnc  -- START BMX prior to meals, continue Gabapentin 800mg TID, Oxycodone 5-10mg Q6hr PRN.   -- Continue ample oral hydration with pedialyte powder.   -- Can omit Dexamethasone this week as pt had intractable hiccups and did not have any nausea  -- Referral for young adult oncology services  (BRETT Augustine, JOHN)

## 2024-04-29 ENCOUNTER — APPOINTMENT (OUTPATIENT)
Dept: RADIATION ONCOLOGY | Facility: HOSPITAL | Age: 39
End: 2024-04-29
Payer: COMMERCIAL

## 2024-04-29 ENCOUNTER — RADIATION ONCOLOGY OTV (OUTPATIENT)
Dept: RADIATION ONCOLOGY | Facility: HOSPITAL | Age: 39
End: 2024-04-29
Payer: COMMERCIAL

## 2024-04-29 ENCOUNTER — HOSPITAL ENCOUNTER (OUTPATIENT)
Dept: RADIATION ONCOLOGY | Facility: HOSPITAL | Age: 39
Setting detail: RADIATION/ONCOLOGY SERIES
Discharge: HOME | End: 2024-04-29
Payer: COMMERCIAL

## 2024-04-29 VITALS
WEIGHT: 163.9 LBS | HEART RATE: 84 BPM | BODY MASS INDEX: 26.45 KG/M2 | RESPIRATION RATE: 18 BRPM | SYSTOLIC BLOOD PRESSURE: 115 MMHG | DIASTOLIC BLOOD PRESSURE: 82 MMHG | TEMPERATURE: 97.7 F | OXYGEN SATURATION: 97 %

## 2024-04-29 DIAGNOSIS — Z51.0 ENCOUNTER FOR ANTINEOPLASTIC RADIATION THERAPY: ICD-10-CM

## 2024-04-29 DIAGNOSIS — C01 MALIGNANT NEOPLASM OF BASE OF TONGUE (MULTI): ICD-10-CM

## 2024-04-29 DIAGNOSIS — C10.9 OROPHARYNGEAL CANCER (MULTI): Primary | ICD-10-CM

## 2024-04-29 LAB
RAD ONC MSQ ACTUAL FRACTIONS DELIVERED: 12
RAD ONC MSQ ACTUAL SESSION DELIVERED DOSE: 200 CGRAY
RAD ONC MSQ ACTUAL TOTAL DOSE: 2400 CGRAY
RAD ONC MSQ ELAPSED DAYS: 13
RAD ONC MSQ LAST DATE: NORMAL
RAD ONC MSQ PRESCRIBED FRACTIONAL DOSE: 200 CGRAY
RAD ONC MSQ PRESCRIBED NUMBER OF FRACTIONS: 35
RAD ONC MSQ PRESCRIBED TECHNIQUE: NORMAL
RAD ONC MSQ PRESCRIBED TOTAL DOSE: 7000 CGRAY
RAD ONC MSQ PRESCRIPTION PATTERN COMMENT: NORMAL
RAD ONC MSQ START DATE: NORMAL
RAD ONC MSQ TREATMENT COURSE NUMBER: 1
RAD ONC MSQ TREATMENT SITE: NORMAL

## 2024-04-29 PROCEDURE — 77014 CHG CT GUIDANCE RADIATION THERAPY FLDS PLACEMENT: CPT | Performed by: RADIOLOGY

## 2024-04-29 PROCEDURE — 77386 HC INTENSITY-MODULATED RADIATION THERAPY (IMRT), COMPLEX: CPT | Performed by: RADIOLOGY

## 2024-04-29 PROCEDURE — 77427 RADIATION TX MANAGEMENT X5: CPT | Performed by: RADIOLOGY

## 2024-04-29 RX ORDER — OXYCODONE HYDROCHLORIDE 5 MG/1
5 TABLET ORAL EVERY 6 HOURS PRN
Qty: 120 TABLET | Refills: 0 | Status: SHIPPED | OUTPATIENT
Start: 2024-04-29 | End: 2024-06-01 | Stop reason: HOSPADM

## 2024-04-29 NOTE — PROGRESS NOTES
Radiation Oncology On Treatment Visit    Patient Name:  Pilo Hanks  MRN:  [id number]  :  1985    Referring Provider: No ref. provider found  Primary Care Provider: Ruth Villa DO  Care Team: Patient Care Team:  Ruth Villa DO as PCP - General  Ruth Villa DO as PCP - [facility] PCP  Raquel Blackman MD as Radiation Oncologist (Radiation Oncology)  Kyunghee Burkitt, DO as Consulting Physician (Hematology and Oncology)  Rosina Fitzgerald PA-C as Physician Assistant (Hematology and Oncology)    Date of Service: 2024     Diagnosis:   Specialty Problems          Radiation Oncology Problems    Malignant neoplasm of oropharynx (Multi)         Treatment Summary:  Radiation Treatments       Active   BOT + nodes (Started on 2024)   Most recent fraction: 200 cGy given on 2024   Total given: 2,400 cGy / 7,000 cGy  (12 of 35 fractions)   Elapsed Days: 13   Technique: VMAT           Completed   No historical radiation treatments to show.             SUBJECTIVE: Having pain with swallowing, about 2-3/10 in severity. Notices decrease in strength of urine stream, but has complete bladder emptying, no dysuria and nocturia X 1. Had IVF last week due to increased BUN/Cr.      OBJECTIVE:   Vital Signs:  /82   Pulse 84   Temp 36.5 °C (97.7 °F) (Temporal)   Resp 18   Wt 74.3 kg (163 lb 14.4 oz)   SpO2 97%   BMI 26.45 kg/m²    Pain Scale: The patient's current pain level was assessed.  They report currently having a pain of 1 out of 10.    Other Pertinent Findings:   L BOT tumor has shrunk tremendously on transoral exam. Just small residual nodule but no exophytic mass.   No mucositis, thrush or desquamation.  Toxicity Assessment          2024    15:06 2024    12:30 2024    10:06   Toxicity Assessment   Adverse Events Reviewed (WDL) No (Exceptions to WDL) No (Exceptions to WDL) No (Exceptions to WDL)   Treatment  and neck Head and neck Head and neck   Anorexia Grade 0        First weight during .6lbs Grade 0 Grade 0       loss of .2lbs since last week, PO intake still normal   Dehydration Grade 0       oral intake of fluids WNL Grade 1       Pt received IV hydration today Grade 0       BP WNL   Dermatitis Radiation Grade 0       creams provided Grade 0       creams at home Grade 0       creams at home   Diarrhea   Grade 0   Fatigue Grade 0 Grade 0 Grade 1       Tired over the weekend, Rest and activity balance   Nausea Grade 0       Meds at home Grade 0 Grade 0   Pain Grade 0       Gabapentin at home, and started today Grade 1       increase in Gabapentin dose Grade 1       Back of mouth/throat L side, Gabapentin at home   Vomiting Grade 0 Grade 0 Grade 0   Dysphagia Grade 0 Grade 1       dry mouth Grade 1       increase in painfull swallowing   Mucositis Oral Grade 0       Glutamine at home, already started 1 week prior to RT Grade 0       Glutamine at home Grade 0       Glutamine at home   Hearing Impaired   Grade 0   Blurred Vision Grade 0     Dry Mouth Grade 0 Grade 1       throughout the day, pt sipping water helps. Grade 1       Sipping water, worse in the morning   Ear Pain Grade 0     Tinnitus Grade 0     Oral Pain Grade 0 Grade 0 Grade 0   Aspiration Grade 0 Grade 0 Grade 0   Hoarseness Grade 0 Grade 0 Grade 0   Voice Alteration Grade 0 Grade 0 Grade 0        Assessment / Plan:  The patient is tolerating radiation therapy as anticipated.  Continue per current treatment plan.       Assessment / Plan:  The patient is tolerating radiation therapy as anticipated.  Continue per current treatment plan.     Reviewed with patient their Symptom Management Form for Head and Neck Cancer:    Oropharyngeal cancer: concurrent bolus cisplatin, received first cycle on 4/16/24. Labs today wnl. Will plan to treat 6 fractions per week per RTOG 1016.  Has had an excellent response to treatment and that there is no longer an exophytic component to his tumor on transoral exam in his base of  tongue as of 4/29/2024.    FEN: patient's weight is stable. Using liquid supplements. Aim for >2000 calories and >60grams of protein daily. Will see a dietician for more specific recommendations.  All diet is oral and no feeding tube.  Met with SLP prior to starting RT and doing daily exercises to maintain swallowing function.    Nausea: Reviewed use of Dexamethasone and Olanzapine after chemo each week to prevent nausea and Compazine/Zofran PRN for breakthrough nausea    Pain control: Has new odynophagia. Gave rx for oxycodone 5 mg as needed. Taking Neurontin 600 mg 3 times daily.    Decrease in strength of urine flow: Unclear what is causing this.  Could be a possible side effect of Neurontin, though this is not very common.  Patient to monitor and should he develop dysuria or incomplete bladder emptying to let us know.  Could adjust dose of Neurontin and/or add Flomax as needed.    Oral Care: Patient will continue fluoride trays per dentist. Patient is using Glutamine 10g TID on days of RT to promote ongoing healing of mucositis. No need currently for Guaifenesin for thick saliva or Xylimelt for dry mouth    Skin Care: Continue Aquaphor within the RT field. Reviewed how to apply and to avoid within 3 hours prior to RT.     Diarrhea/Constipation: Reviewed use of Immodium for diarrhea. Reviewed use of Miralax or Senna if develops constipation.

## 2024-04-30 ENCOUNTER — LAB (OUTPATIENT)
Dept: LAB | Facility: HOSPITAL | Age: 39
End: 2024-04-30
Payer: COMMERCIAL

## 2024-04-30 ENCOUNTER — APPOINTMENT (OUTPATIENT)
Dept: RADIATION ONCOLOGY | Facility: HOSPITAL | Age: 39
End: 2024-04-30
Payer: COMMERCIAL

## 2024-04-30 ENCOUNTER — HOSPITAL ENCOUNTER (OUTPATIENT)
Dept: RADIATION ONCOLOGY | Facility: HOSPITAL | Age: 39
Setting detail: RADIATION/ONCOLOGY SERIES
Discharge: HOME | End: 2024-04-30
Payer: COMMERCIAL

## 2024-04-30 DIAGNOSIS — Z51.0 ENCOUNTER FOR ANTINEOPLASTIC RADIATION THERAPY: ICD-10-CM

## 2024-04-30 DIAGNOSIS — C10.9 MALIGNANT NEOPLASM OF OROPHARYNX (MULTI): ICD-10-CM

## 2024-04-30 DIAGNOSIS — C01 MALIGNANT NEOPLASM OF BASE OF TONGUE (MULTI): ICD-10-CM

## 2024-04-30 LAB
ALBUMIN SERPL BCP-MCNC: 4.3 G/DL (ref 3.4–5)
ALP SERPL-CCNC: 46 U/L (ref 33–120)
ALT SERPL W P-5'-P-CCNC: 10 U/L (ref 10–52)
ANION GAP SERPL CALC-SCNC: 14 MMOL/L (ref 10–20)
AST SERPL W P-5'-P-CCNC: 11 U/L (ref 9–39)
BASOPHILS # BLD AUTO: 0.02 X10*3/UL (ref 0–0.1)
BASOPHILS NFR BLD AUTO: 0.3 %
BILIRUB SERPL-MCNC: 0.5 MG/DL (ref 0–1.2)
BUN SERPL-MCNC: 23 MG/DL (ref 6–23)
CALCIUM SERPL-MCNC: 9.1 MG/DL (ref 8.6–10.3)
CHLORIDE SERPL-SCNC: 98 MMOL/L (ref 98–107)
CO2 SERPL-SCNC: 30 MMOL/L (ref 21–32)
CREAT SERPL-MCNC: 1.34 MG/DL (ref 0.5–1.3)
EGFRCR SERPLBLD CKD-EPI 2021: 69 ML/MIN/1.73M*2
EOSINOPHIL # BLD AUTO: 0.07 X10*3/UL (ref 0–0.7)
EOSINOPHIL NFR BLD AUTO: 1 %
ERYTHROCYTE [DISTWIDTH] IN BLOOD BY AUTOMATED COUNT: 11.9 % (ref 11.5–14.5)
GLUCOSE SERPL-MCNC: 90 MG/DL (ref 74–99)
HCT VFR BLD AUTO: 32.5 % (ref 41–52)
HGB BLD-MCNC: 11 G/DL (ref 13.5–17.5)
IMM GRANULOCYTES # BLD AUTO: 0.01 X10*3/UL (ref 0–0.7)
IMM GRANULOCYTES NFR BLD AUTO: 0.1 % (ref 0–0.9)
LYMPHOCYTES # BLD AUTO: 0.43 X10*3/UL (ref 1.2–4.8)
LYMPHOCYTES NFR BLD AUTO: 6 %
MAGNESIUM SERPL-MCNC: 1.85 MG/DL (ref 1.6–2.4)
MCH RBC QN AUTO: 29.4 PG (ref 26–34)
MCHC RBC AUTO-ENTMCNC: 33.8 G/DL (ref 32–36)
MCV RBC AUTO: 87 FL (ref 80–100)
MONOCYTES # BLD AUTO: 0.58 X10*3/UL (ref 0.1–1)
MONOCYTES NFR BLD AUTO: 8.1 %
NEUTROPHILS # BLD AUTO: 6.07 X10*3/UL (ref 1.2–7.7)
NEUTROPHILS NFR BLD AUTO: 84.5 %
NRBC BLD-RTO: 0 /100 WBCS (ref 0–0)
PLATELET # BLD AUTO: 226 X10*3/UL (ref 150–450)
POTASSIUM SERPL-SCNC: 4.5 MMOL/L (ref 3.5–5.3)
PROT SERPL-MCNC: 7.6 G/DL (ref 6.4–8.2)
RAD ONC MSQ ACTUAL FRACTIONS DELIVERED: 13
RAD ONC MSQ ACTUAL SESSION DELIVERED DOSE: 200 CGRAY
RAD ONC MSQ ACTUAL TOTAL DOSE: 2600 CGRAY
RAD ONC MSQ ELAPSED DAYS: 14
RAD ONC MSQ LAST DATE: NORMAL
RAD ONC MSQ PRESCRIBED FRACTIONAL DOSE: 200 CGRAY
RAD ONC MSQ PRESCRIBED NUMBER OF FRACTIONS: 35
RAD ONC MSQ PRESCRIBED TECHNIQUE: NORMAL
RAD ONC MSQ PRESCRIBED TOTAL DOSE: 7000 CGRAY
RAD ONC MSQ PRESCRIPTION PATTERN COMMENT: NORMAL
RAD ONC MSQ START DATE: NORMAL
RAD ONC MSQ TREATMENT COURSE NUMBER: 1
RAD ONC MSQ TREATMENT SITE: NORMAL
RBC # BLD AUTO: 3.74 X10*6/UL (ref 4.5–5.9)
SODIUM SERPL-SCNC: 137 MMOL/L (ref 136–145)
WBC # BLD AUTO: 7.2 X10*3/UL (ref 4.4–11.3)

## 2024-04-30 PROCEDURE — 36415 COLL VENOUS BLD VENIPUNCTURE: CPT

## 2024-04-30 PROCEDURE — 85025 COMPLETE CBC W/AUTO DIFF WBC: CPT

## 2024-04-30 PROCEDURE — 77014 CHG CT GUIDANCE RADIATION THERAPY FLDS PLACEMENT: CPT | Performed by: RADIOLOGY

## 2024-04-30 PROCEDURE — 77386 HC INTENSITY-MODULATED RADIATION THERAPY (IMRT), COMPLEX: CPT | Performed by: RADIOLOGY

## 2024-04-30 PROCEDURE — 83735 ASSAY OF MAGNESIUM: CPT

## 2024-04-30 PROCEDURE — 80053 COMPREHEN METABOLIC PANEL: CPT

## 2024-05-01 ENCOUNTER — TELEPHONE (OUTPATIENT)
Dept: RADIATION ONCOLOGY | Facility: HOSPITAL | Age: 39
End: 2024-05-01
Payer: COMMERCIAL

## 2024-05-01 ENCOUNTER — APPOINTMENT (OUTPATIENT)
Dept: RADIATION ONCOLOGY | Facility: HOSPITAL | Age: 39
End: 2024-05-01
Payer: COMMERCIAL

## 2024-05-01 ENCOUNTER — HOSPITAL ENCOUNTER (OUTPATIENT)
Dept: RADIATION ONCOLOGY | Facility: HOSPITAL | Age: 39
Setting detail: RADIATION/ONCOLOGY SERIES
Discharge: HOME | End: 2024-05-01
Payer: COMMERCIAL

## 2024-05-01 DIAGNOSIS — C01 MALIGNANT NEOPLASM OF BASE OF TONGUE (MULTI): ICD-10-CM

## 2024-05-01 DIAGNOSIS — Z51.0 ENCOUNTER FOR ANTINEOPLASTIC RADIATION THERAPY: ICD-10-CM

## 2024-05-01 LAB
RAD ONC MSQ ACTUAL FRACTIONS DELIVERED: 14
RAD ONC MSQ ACTUAL SESSION DELIVERED DOSE: 200 CGRAY
RAD ONC MSQ ACTUAL TOTAL DOSE: 2800 CGRAY
RAD ONC MSQ ELAPSED DAYS: 15
RAD ONC MSQ LAST DATE: NORMAL
RAD ONC MSQ PRESCRIBED FRACTIONAL DOSE: 200 CGRAY
RAD ONC MSQ PRESCRIBED NUMBER OF FRACTIONS: 35
RAD ONC MSQ PRESCRIBED TECHNIQUE: NORMAL
RAD ONC MSQ PRESCRIBED TOTAL DOSE: 7000 CGRAY
RAD ONC MSQ PRESCRIPTION PATTERN COMMENT: NORMAL
RAD ONC MSQ START DATE: NORMAL
RAD ONC MSQ TREATMENT COURSE NUMBER: 1
RAD ONC MSQ TREATMENT SITE: NORMAL

## 2024-05-01 PROCEDURE — 77386 HC INTENSITY-MODULATED RADIATION THERAPY (IMRT), COMPLEX: CPT | Performed by: RADIOLOGY

## 2024-05-01 PROCEDURE — 77336 RADIATION PHYSICS CONSULT: CPT | Performed by: RADIOLOGY

## 2024-05-01 PROCEDURE — 77014 CHG CT GUIDANCE RADIATION THERAPY FLDS PLACEMENT: CPT | Performed by: RADIOLOGY

## 2024-05-02 ENCOUNTER — HOSPITAL ENCOUNTER (OUTPATIENT)
Dept: RADIATION ONCOLOGY | Facility: HOSPITAL | Age: 39
Setting detail: RADIATION/ONCOLOGY SERIES
Discharge: HOME | End: 2024-05-02
Payer: COMMERCIAL

## 2024-05-02 ENCOUNTER — APPOINTMENT (OUTPATIENT)
Dept: RADIATION ONCOLOGY | Facility: HOSPITAL | Age: 39
End: 2024-05-02
Payer: COMMERCIAL

## 2024-05-02 DIAGNOSIS — C01 MALIGNANT NEOPLASM OF BASE OF TONGUE (MULTI): ICD-10-CM

## 2024-05-02 DIAGNOSIS — Z51.0 ENCOUNTER FOR ANTINEOPLASTIC RADIATION THERAPY: ICD-10-CM

## 2024-05-02 LAB
RAD ONC MSQ ACTUAL FRACTIONS DELIVERED: 15
RAD ONC MSQ ACTUAL FRACTIONS DELIVERED: 16
RAD ONC MSQ ACTUAL SESSION DELIVERED DOSE: 200 CGRAY
RAD ONC MSQ ACTUAL SESSION DELIVERED DOSE: 200 CGRAY
RAD ONC MSQ ACTUAL TOTAL DOSE: 3000 CGRAY
RAD ONC MSQ ACTUAL TOTAL DOSE: 3200 CGRAY
RAD ONC MSQ ELAPSED DAYS: 16
RAD ONC MSQ ELAPSED DAYS: 16
RAD ONC MSQ LAST DATE: NORMAL
RAD ONC MSQ LAST DATE: NORMAL
RAD ONC MSQ PRESCRIBED FRACTIONAL DOSE: 200 CGRAY
RAD ONC MSQ PRESCRIBED FRACTIONAL DOSE: 200 CGRAY
RAD ONC MSQ PRESCRIBED NUMBER OF FRACTIONS: 35
RAD ONC MSQ PRESCRIBED NUMBER OF FRACTIONS: 35
RAD ONC MSQ PRESCRIBED TECHNIQUE: NORMAL
RAD ONC MSQ PRESCRIBED TECHNIQUE: NORMAL
RAD ONC MSQ PRESCRIBED TOTAL DOSE: 7000 CGRAY
RAD ONC MSQ PRESCRIBED TOTAL DOSE: 7000 CGRAY
RAD ONC MSQ PRESCRIPTION PATTERN COMMENT: NORMAL
RAD ONC MSQ PRESCRIPTION PATTERN COMMENT: NORMAL
RAD ONC MSQ START DATE: NORMAL
RAD ONC MSQ START DATE: NORMAL
RAD ONC MSQ TREATMENT COURSE NUMBER: 1
RAD ONC MSQ TREATMENT COURSE NUMBER: 1
RAD ONC MSQ TREATMENT SITE: NORMAL
RAD ONC MSQ TREATMENT SITE: NORMAL

## 2024-05-02 PROCEDURE — 77014 CHG CT GUIDANCE RADIATION THERAPY FLDS PLACEMENT: CPT | Performed by: RADIOLOGY

## 2024-05-02 PROCEDURE — 77386 HC INTENSITY-MODULATED RADIATION THERAPY (IMRT), COMPLEX: CPT | Mod: XE | Performed by: RADIOLOGY

## 2024-05-02 PROCEDURE — 77386 HC INTENSITY-MODULATED RADIATION THERAPY (IMRT), COMPLEX: CPT | Performed by: RADIOLOGY

## 2024-05-03 ENCOUNTER — APPOINTMENT (OUTPATIENT)
Dept: RADIATION ONCOLOGY | Facility: HOSPITAL | Age: 39
End: 2024-05-03
Payer: COMMERCIAL

## 2024-05-03 ENCOUNTER — INFUSION (OUTPATIENT)
Dept: HEMATOLOGY/ONCOLOGY | Facility: HOSPITAL | Age: 39
End: 2024-05-03
Payer: COMMERCIAL

## 2024-05-03 ENCOUNTER — NUTRITION (OUTPATIENT)
Dept: HEMATOLOGY/ONCOLOGY | Facility: HOSPITAL | Age: 39
End: 2024-05-03
Payer: COMMERCIAL

## 2024-05-03 ENCOUNTER — HOSPITAL ENCOUNTER (OUTPATIENT)
Dept: RADIATION ONCOLOGY | Facility: HOSPITAL | Age: 39
Setting detail: RADIATION/ONCOLOGY SERIES
Discharge: HOME | End: 2024-05-03
Payer: COMMERCIAL

## 2024-05-03 VITALS
RESPIRATION RATE: 18 BRPM | OXYGEN SATURATION: 100 % | DIASTOLIC BLOOD PRESSURE: 71 MMHG | TEMPERATURE: 97.3 F | HEART RATE: 86 BPM | WEIGHT: 159.39 LBS | BODY MASS INDEX: 25.73 KG/M2 | SYSTOLIC BLOOD PRESSURE: 119 MMHG

## 2024-05-03 DIAGNOSIS — C01 MALIGNANT NEOPLASM OF BASE OF TONGUE (MULTI): ICD-10-CM

## 2024-05-03 DIAGNOSIS — Z51.0 ENCOUNTER FOR ANTINEOPLASTIC RADIATION THERAPY: ICD-10-CM

## 2024-05-03 DIAGNOSIS — C10.9 MALIGNANT NEOPLASM OF OROPHARYNX (MULTI): Primary | ICD-10-CM

## 2024-05-03 LAB
ALBUMIN SERPL BCP-MCNC: 4.4 G/DL (ref 3.4–5)
ALP SERPL-CCNC: 47 U/L (ref 33–120)
ALT SERPL W P-5'-P-CCNC: 8 U/L (ref 10–52)
ANION GAP SERPL CALC-SCNC: 13 MMOL/L (ref 10–20)
AST SERPL W P-5'-P-CCNC: 11 U/L (ref 9–39)
BASOPHILS # BLD AUTO: 0.01 X10*3/UL (ref 0–0.1)
BASOPHILS NFR BLD AUTO: 0.4 %
BILIRUB SERPL-MCNC: 0.3 MG/DL (ref 0–1.2)
BUN SERPL-MCNC: 25 MG/DL (ref 6–23)
CALCIUM SERPL-MCNC: 9.1 MG/DL (ref 8.6–10.3)
CHLORIDE SERPL-SCNC: 96 MMOL/L (ref 98–107)
CO2 SERPL-SCNC: 30 MMOL/L (ref 21–32)
CREAT SERPL-MCNC: 1.38 MG/DL (ref 0.5–1.3)
EGFRCR SERPLBLD CKD-EPI 2021: 66 ML/MIN/1.73M*2
EOSINOPHIL # BLD AUTO: 0.14 X10*3/UL (ref 0–0.7)
EOSINOPHIL NFR BLD AUTO: 6.3 %
ERYTHROCYTE [DISTWIDTH] IN BLOOD BY AUTOMATED COUNT: 12.1 % (ref 11.5–14.5)
GLUCOSE SERPL-MCNC: 92 MG/DL (ref 74–99)
HCT VFR BLD AUTO: 31 % (ref 41–52)
HGB BLD-MCNC: 10.5 G/DL (ref 13.5–17.5)
IMM GRANULOCYTES # BLD AUTO: 0.01 X10*3/UL (ref 0–0.7)
IMM GRANULOCYTES NFR BLD AUTO: 0.4 % (ref 0–0.9)
LYMPHOCYTES # BLD AUTO: 0.25 X10*3/UL (ref 1.2–4.8)
LYMPHOCYTES NFR BLD AUTO: 11.2 %
MAGNESIUM SERPL-MCNC: 1.96 MG/DL (ref 1.6–2.4)
MCH RBC QN AUTO: 29.5 PG (ref 26–34)
MCHC RBC AUTO-ENTMCNC: 33.9 G/DL (ref 32–36)
MCV RBC AUTO: 87 FL (ref 80–100)
MONOCYTES # BLD AUTO: 0.39 X10*3/UL (ref 0.1–1)
MONOCYTES NFR BLD AUTO: 17.5 %
NEUTROPHILS # BLD AUTO: 1.43 X10*3/UL (ref 1.2–7.7)
NEUTROPHILS NFR BLD AUTO: 64.2 %
NRBC BLD-RTO: 0 /100 WBCS (ref 0–0)
PLATELET # BLD AUTO: 324 X10*3/UL (ref 150–450)
POTASSIUM SERPL-SCNC: 4.2 MMOL/L (ref 3.5–5.3)
PROT SERPL-MCNC: 7.7 G/DL (ref 6.4–8.2)
RAD ONC MSQ ACTUAL FRACTIONS DELIVERED: 17
RAD ONC MSQ ACTUAL SESSION DELIVERED DOSE: 200 CGRAY
RAD ONC MSQ ACTUAL TOTAL DOSE: 3400 CGRAY
RAD ONC MSQ ELAPSED DAYS: 17
RAD ONC MSQ LAST DATE: NORMAL
RAD ONC MSQ PRESCRIBED FRACTIONAL DOSE: 200 CGRAY
RAD ONC MSQ PRESCRIBED NUMBER OF FRACTIONS: 35
RAD ONC MSQ PRESCRIBED TECHNIQUE: NORMAL
RAD ONC MSQ PRESCRIBED TOTAL DOSE: 7000 CGRAY
RAD ONC MSQ PRESCRIPTION PATTERN COMMENT: NORMAL
RAD ONC MSQ START DATE: NORMAL
RAD ONC MSQ TREATMENT COURSE NUMBER: 1
RAD ONC MSQ TREATMENT SITE: NORMAL
RBC # BLD AUTO: 3.56 X10*6/UL (ref 4.5–5.9)
SODIUM SERPL-SCNC: 135 MMOL/L (ref 136–145)
WBC # BLD AUTO: 2.2 X10*3/UL (ref 4.4–11.3)

## 2024-05-03 PROCEDURE — 83735 ASSAY OF MAGNESIUM: CPT

## 2024-05-03 PROCEDURE — 2500000004 HC RX 250 GENERAL PHARMACY W/ HCPCS (ALT 636 FOR OP/ED): Performed by: STUDENT IN AN ORGANIZED HEALTH CARE EDUCATION/TRAINING PROGRAM

## 2024-05-03 PROCEDURE — 80053 COMPREHEN METABOLIC PANEL: CPT

## 2024-05-03 PROCEDURE — 96360 HYDRATION IV INFUSION INIT: CPT | Mod: INF

## 2024-05-03 PROCEDURE — 77014 CHG CT GUIDANCE RADIATION THERAPY FLDS PLACEMENT: CPT | Performed by: RADIOLOGY

## 2024-05-03 PROCEDURE — 85025 COMPLETE CBC W/AUTO DIFF WBC: CPT

## 2024-05-03 PROCEDURE — 77386 HC INTENSITY-MODULATED RADIATION THERAPY (IMRT), COMPLEX: CPT | Performed by: RADIOLOGY

## 2024-05-03 RX ORDER — HEPARIN 100 UNIT/ML
500 SYRINGE INTRAVENOUS AS NEEDED
Status: CANCELLED | OUTPATIENT
Start: 2024-05-03

## 2024-05-03 RX ORDER — HEPARIN SODIUM,PORCINE/PF 10 UNIT/ML
50 SYRINGE (ML) INTRAVENOUS AS NEEDED
Status: CANCELLED | OUTPATIENT
Start: 2024-05-03

## 2024-05-03 RX ADMIN — SODIUM CHLORIDE 1000 ML: 9 INJECTION, SOLUTION INTRAVENOUS at 15:15

## 2024-05-03 ASSESSMENT — PAIN SCALES - GENERAL: PAINLEVEL: 0-NO PAIN

## 2024-05-03 NOTE — PROGRESS NOTES
NUTRITION {Type of note:8301361} NOTE    Nutrition Assessment     Reason for Visit:  Pilo Hanks is a 39 y.o. male who presents for No chief complaint on file.    Diagnosis: ***    Current Treatment: ***    Subjective: ***        4/19/2024     3:29 PM 4/25/2024     9:42 AM 4/25/2024    11:12 AM 4/25/2024    11:14 AM 4/26/2024    10:24 AM 4/29/2024     9:57 AM 4/29/2024     9:58 AM   Vitals   Systolic 141 117 117 122 135 117 115   Diastolic 70 82 79 86 85 80 82   Heart Rate 86 95 79 80 81 90 84   Temp 36.3 °C (97.3 °F) 36.9 °C (98.4 °F) 37 °C (98.6 °F)  36.6 °C (97.9 °F) 36.5 °C (97.7 °F)    Resp 18 18 18  19 18 18   Weight (lb) 167.33 164.02 167.6  165.7  163.9   BMI 27.01 kg/m2 26.47 kg/m2 27.05 kg/m2  26.74 kg/m2  26.45 kg/m2   BSA (m2) 1.88 m2 1.86 m2 1.88 m2  1.87 m2  1.86 m2   Visit Report  Report Report Report          Wt Readings from Last 10 Encounters:   04/29/24 74.3 kg (163 lb 14.4 oz)   04/26/24 75.2 kg (165 lb 11.2 oz)   04/25/24 76 kg (167 lb 9.6 oz)   04/25/24 74.4 kg (164 lb 0.4 oz)   04/19/24 75.9 kg (167 lb 5.3 oz)   04/16/24 77.8 kg (171 lb 9.6 oz)   04/16/24 75.2 kg (165 lb 12.8 oz)   04/16/24 75.2 kg (165 lb 12.8 oz)   04/09/24 76.6 kg (168 lb 14 oz)   04/04/24 76.6 kg (168 lb 14 oz)     Weight Change:  Weight loss of *** kg (***%) x ***  {Significant Weight Change:50985}    Lab Results   Component Value Date/Time    GLUCOSE 90 04/30/2024 0931     04/30/2024 0931    K 4.5 04/30/2024 0931    CL 98 04/30/2024 0931    CO2 30 04/30/2024 0931    ANIONGAP 14 04/30/2024 0931    BUN 23 04/30/2024 0931    CREATININE 1.34 (H) 04/30/2024 0931    EGFR 69 04/30/2024 0931    CALCIUM 9.1 04/30/2024 0931    ALBUMIN 4.3 04/30/2024 0931    ALKPHOS 46 04/30/2024 0931    PROT 7.6 04/30/2024 0931    AST 11 04/30/2024 0931    BILITOT 0.5 04/30/2024 0931    ALT 10 04/30/2024 0931     Lab Results   Component Value Date/Time    VITD25 13 (A) 08/18/2021 1050        Food And Nutrient Intake:  {Current  Intake:15312}  ***  {GI Symptoms:73345:::1}   Length of symptoms: ***  {Allergies:03909}  {Intolerance:07699}  {Appetite:50271}        Nutrition Focused Physical Exam Findings:  {Performed/Deferred:98701}    Muscle Wasting:  {Temporal:57710}  {Shoulder:17478}  {Clavicle:06383}  {Interosseous Muscle:34715}  {Quadriceps:76818}    Loss of Subcutaneous Fat:  {Eyes:04224}  {Perioral:16082}  {Triceps:51872}  {Chest:58556}    Other Physical Findings:  {Hair:77879}  {Eyes:20003}  {Mouth:31098}  {Skin:91161}  {Nails:38539}  {Edema:68942}    {Malnutrition Present:71062}    ESTIMATED ENERGY NEEDS  Dosing weight: *** kg  Calories per day: ***  determined by *** kcal/kg  Protein (g) per day: *** determined by *** g/kg  Estimated fluid needs: *** determined by 1 kcal/mL    NUTRITION DIAGNOSIS  Diagnosis Statement 1:  {Diagnosis Status:21915}  {Diagnosis (Optional):35851} related to {Etiologies:02160} as evidenced by {Signs/Symptoms:35535}    NUTRITION INTERVENTION  ***    Diet Education Materials: ***    MONITOR AND EVALUATION  {Monitor and Evaluation:36045}    Need for follow-up: ***         {Readiness to Change (Optional):57001}  {Level of Understanding (Optional):45653}  {Anticipated Compliant (Optional):31653}

## 2024-05-03 NOTE — PROGRESS NOTES
Patient arrived to infusion for IV fluids. Infusion tolerated without difficulty. Patient discharged to home in stable condition.

## 2024-05-03 NOTE — PROGRESS NOTES
"NUTRITION Follow-up NOTE    Nutrition Assessment     Reason for Visit:  Pilo Hanks is a 39 y.o. male who presents for follow-up.     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     Subjective: Pt seen today in clinic. Endorses sore throat, taste changes (metallic taste), and dryness within his mouth. He states his eating has declined and is relying on foods with more soft or moist texture. Pt also says that he sometimes feels that food is going down \"the wrong pipe\". Began purchasing nutrition supplements, including Muscle Milk.     Yesterday  ____________  1/4 banana   1 chocolate pudding   \"A little\" [employer] Mac and Cheese  Mashed potatoes   1 Muscle Milk         4/25/2024     9:42 AM 4/25/2024    11:12 AM 4/25/2024    11:14 AM 4/26/2024    10:24 AM 4/29/2024     9:57 AM 4/29/2024     9:58 AM 5/3/2024     2:59 PM   Vitals   Systolic 117 117 122 135 117 115 119   Diastolic 82 79 86 85 80 82 71   Heart Rate 95 79 80 81 90 84 86   Temp 36.9 °C (98.4 °F) 37 °C (98.6 °F)  36.6 °C (97.9 °F) 36.5 °C (97.7 °F)  36.3 °C (97.3 °F)   Resp 18 18  19 18 18 18   Weight (lb) 164.02 167.6  165.7  163.9 159.39   BMI 26.47 kg/m2 27.05 kg/m2  26.74 kg/m2  26.45 kg/m2 25.73 kg/m2   BSA (m2) 1.86 m2 1.88 m2  1.87 m2  1.86 m2 1.83 m2   Visit Report Report Report Report           Wt Readings from Last 10 Encounters:   05/03/24 72.3 kg (159 lb 6.3 oz)   04/29/24 74.3 kg (163 lb 14.4 oz)   04/26/24 75.2 kg (165 lb 11.2 oz)   04/25/24 76 kg (167 lb 9.6 oz)   04/25/24 74.4 kg (164 lb 0.4 oz)   04/19/24 75.9 kg (167 lb 5.3 oz)   04/16/24 77.8 kg (171 lb 9.6 oz)   04/16/24 75.2 kg (165 lb 12.8 oz)   04/16/24 75.2 kg (165 lb 12.8 oz)   04/09/24 76.6 kg (168 lb 14 oz)     Weight Change:  Weight loss of 2 kg (3%) over 5 days   Significant Weight Change: Yes    Lab Results   Component Value Date/Time    GLUCOSE 92 05/03/2024 1517     (L) 05/03/2024 1517    K 4.2 " 05/03/2024 1517    CL 96 (L) 05/03/2024 1517    CO2 30 05/03/2024 1517    ANIONGAP 13 05/03/2024 1517    BUN 25 (H) 05/03/2024 1517    CREATININE 1.38 (H) 05/03/2024 1517    EGFR 66 05/03/2024 1517    CALCIUM 9.1 05/03/2024 1517    ALBUMIN 4.4 05/03/2024 1517    ALKPHOS 47 05/03/2024 1517    PROT 7.7 05/03/2024 1517    AST 11 05/03/2024 1517    BILITOT 0.3 05/03/2024 1517    ALT 8 (L) 05/03/2024 1517     Lab Results   Component Value Date/Time    VITD25 13 (A) 08/18/2021 1050        Food And Nutrient Intake:  Current Intake: <75%  of estimated energy needs >7 days  GI Symptoms: Taste Changes, Dysphagia, Diarrhea, and Constipation   Length of symptoms: 1 week (pt said 2 weeks ago, he was experiencing diarrhea, but now is having constipation this week. Intends to take Miralax when he returns home after infusion).   Allergies: None  Intolerance: None  Appetite: Normal    Nutrition Focused Physical Exam Findings:  Performed/Deferred: Deferred as pt visually appears well-nourished with no signs of malnutrition    Malnutrition Present: No    ESTIMATED ENERGY NEEDS  Dosing weight: 72.3 kg  Calories per day: 1837-6867  determined by 25-30 kcal/kg  Protein (g) per day:  determined by 1.0-1.5 g/kg  Estimated fluid needs: 0947-9987 determined by 1 kcal/mL    NUTRITION DIAGNOSIS  Diagnosis Statement 1:  Diagnosis Status: Ongoing  Diagnosis : Inadequate energy intake  related to chronic condition as evidenced by chemotherapy     NUTRITION INTERVENTION  -Drink nutrition supplement instead of missing a meal (gave pt Boost VHP and Ensure Plus to sample)   -Encourage adequate hydration   -Small/frequent meals/snacks   -Taste      Diet Education Materials: taste changes handout     MONITOR AND EVALUATION  Monitor and Evaluation: Oral intake, Weight trend, and Nutrition Supplement use    Need for follow-up: Next chemo          Readiness to Change : Excellent  Level of Understanding : Excellent  Anticipated  Compliant : Excellent

## 2024-05-06 ENCOUNTER — APPOINTMENT (OUTPATIENT)
Dept: RADIATION ONCOLOGY | Facility: HOSPITAL | Age: 39
End: 2024-05-06
Payer: COMMERCIAL

## 2024-05-06 ENCOUNTER — HOSPITAL ENCOUNTER (OUTPATIENT)
Dept: RADIATION ONCOLOGY | Facility: HOSPITAL | Age: 39
Setting detail: RADIATION/ONCOLOGY SERIES
Discharge: HOME | End: 2024-05-06
Payer: COMMERCIAL

## 2024-05-06 ENCOUNTER — RADIATION ONCOLOGY OTV (OUTPATIENT)
Dept: RADIATION ONCOLOGY | Facility: HOSPITAL | Age: 39
End: 2024-05-06
Payer: COMMERCIAL

## 2024-05-06 VITALS
WEIGHT: 162 LBS | SYSTOLIC BLOOD PRESSURE: 100 MMHG | RESPIRATION RATE: 18 BRPM | HEIGHT: 66 IN | HEART RATE: 103 BPM | TEMPERATURE: 97.5 F | OXYGEN SATURATION: 99 % | BODY MASS INDEX: 26.03 KG/M2 | DIASTOLIC BLOOD PRESSURE: 69 MMHG

## 2024-05-06 DIAGNOSIS — Z51.0 ENCOUNTER FOR ANTINEOPLASTIC RADIATION THERAPY: ICD-10-CM

## 2024-05-06 DIAGNOSIS — C10.9 OROPHARYNX CANCER (MULTI): Primary | ICD-10-CM

## 2024-05-06 DIAGNOSIS — C01 MALIGNANT NEOPLASM OF BASE OF TONGUE (MULTI): ICD-10-CM

## 2024-05-06 LAB
RAD ONC MSQ ACTUAL FRACTIONS DELIVERED: 18
RAD ONC MSQ ACTUAL SESSION DELIVERED DOSE: 200 CGRAY
RAD ONC MSQ ACTUAL TOTAL DOSE: 3600 CGRAY
RAD ONC MSQ ELAPSED DAYS: 20
RAD ONC MSQ LAST DATE: NORMAL
RAD ONC MSQ PRESCRIBED FRACTIONAL DOSE: 200 CGRAY
RAD ONC MSQ PRESCRIBED NUMBER OF FRACTIONS: 35
RAD ONC MSQ PRESCRIBED TECHNIQUE: NORMAL
RAD ONC MSQ PRESCRIBED TOTAL DOSE: 7000 CGRAY
RAD ONC MSQ PRESCRIPTION PATTERN COMMENT: NORMAL
RAD ONC MSQ START DATE: NORMAL
RAD ONC MSQ TREATMENT COURSE NUMBER: 1
RAD ONC MSQ TREATMENT SITE: NORMAL

## 2024-05-06 PROCEDURE — 77427 RADIATION TX MANAGEMENT X5: CPT | Performed by: RADIOLOGY

## 2024-05-06 PROCEDURE — 77014 CHG CT GUIDANCE RADIATION THERAPY FLDS PLACEMENT: CPT | Performed by: RADIOLOGY

## 2024-05-06 PROCEDURE — 77386 HC INTENSITY-MODULATED RADIATION THERAPY (IMRT), COMPLEX: CPT | Performed by: RADIOLOGY

## 2024-05-06 RX ORDER — GABAPENTIN 800 MG/1
800 TABLET ORAL 3 TIMES DAILY
Qty: 90 CAPSULE | Refills: 0 | Status: SHIPPED | OUTPATIENT
Start: 2024-05-06 | End: 2024-06-05

## 2024-05-06 NOTE — PROGRESS NOTES
"Radiation Oncology On Treatment Visit    Patient Name:  Pilo Hanks  MRN:  [id number]  :  1985    Referring Provider: No ref. provider found  Primary Care Provider: Ruth Villa DO  Care Team: Patient Care Team:  Ruth Villa DO as PCP - General  Ruth Villa DO as PCP - Nancy [facility]Z PCP  Raquel Blackman MD as Radiation Oncologist (Radiation Oncology)  Kyunghee Burkitt, DO as Consulting Physician (Hematology and Oncology)  Rosina Fitzgerald PA-C as Physician Assistant (Hematology and Oncology)    Date of Service: 2024     Diagnosis:   Specialty Problems          Radiation Oncology Problems    Malignant neoplasm of oropharynx (Multi)         Treatment Summary:  IMRT: Bilateral Head and neck    Treatment Period Technique Fraction Dose Fractions Total Dose   Course 1 2024-2024  (days elapsed: 20)         BOT + nodes 2024-2024 VMAT 200 / 200 cGy 18 / 35 3600 / 7,000 cGy     SUBJECTIVE:    Patient reports more pain, that is up to a 6/10 in severity with swallowing, decreased to 3/10 after oxycodone 5mg. Just started oxy yesterday. No nausea. He is only able to drink and eat moist foods currently. Has trouble swallowing dry solids. No dizziness.    OBJECTIVE:   Vital Signs:  /69   Pulse 103   Temp 36.4 °C (97.5 °F) (Temporal)   Resp 18   Ht 1.676 m (5' 6\")   Wt 73.5 kg (162 lb)   SpO2 99%   BMI 26.15 kg/m²    Pain Scale: The patient's current pain level was assessed.  They report currently having a pain of 4 out of 10.    Other Pertinent Findings:   L BOT tumor has shrunk tremendously on transoral exam. This week nodule has completely resolved. but  mucositis in posterior oropharynx, thrush or desquamation.  Toxicity Assessment          2024    15:06 2024    12:30 2024    10:06 2024    10:11   Toxicity Assessment   Adverse Events Reviewed (WDL) No (Exceptions to WDL) No (Exceptions to WDL) No (Exceptions to WDL) No (Exceptions to WDL)   Treatment  " and neck Head and neck Head and neck Head and neck   Anorexia Grade 0       First weight during .6lbs Grade 0 Grade 0       loss of .2lbs since last week, PO intake still normal Grade 1       increase in painful swallowing, loss of 1.5lbs since last week. BMX?   Dehydration Grade 0       oral intake of fluids WNL Grade 1       Pt received IV hydration today Grade 0       BP WNL Grade 1       Ortho positive. fluids encouraged.   Dermatitis Radiation Grade 0       creams provided Grade 0       creams at home Grade 0       creams at home Grade 1       Some slight redness/darkening. Creams provided and at home   Diarrhea   Grade 0 Grade 0       Constipation at times, Mirilax at home   Fatigue Grade 0 Grade 0 Grade 1       Tired over the weekend, Rest and activity balance Grade 1       Not very tired but energy level is lower. Rest and activity balance   Nausea Grade 0       Meds at home Grade 0 Grade 0 Grade 0   Pain Grade 0       Gabapentin at home, and started today Grade 1       increase in Gabapentin dose Grade 1       Back of mouth/throat L side, Gabapentin at home Grade 1       Throat/Mouth   Vomiting Grade 0 Grade 0 Grade 0 Grade 0   Dysphagia Grade 0 Grade 1       dry mouth Grade 1       increase in painfull swallowing Grade 1       increase in pain/possible aspiration. SLP following   Mucositis Oral Grade 0       Glutamine at home, already started 1 week prior to RT Grade 0       Glutamine at home Grade 0       Glutamine at home Grade 1       Sore present with general redness, Glutamine at home   Hearing Impaired   Grade 0    Blurred Vision Grade 0      Dry Mouth Grade 0 Grade 1       throughout the day, pt sipping water helps. Grade 1       Sipping water, worse in the morning Grade 1       Worse in the AM, Gultamine and Humidifier encouraged   Ear Pain Grade 0      Tinnitus Grade 0      Oral Pain Grade 0 Grade 0 Grade 0 Grade 1       increase in burning while eating   Aspiration Grade 0 Grade 0 Grade 0  Grade 0   Hoarseness Grade 0 Grade 0 Grade 0 Grade 0   Voice Alteration Grade 0 Grade 0 Grade 0 Grade 0        Assessment / Plan:  The patient is tolerating radiation therapy as anticipated.  Continue per current treatment plan.      Reviewed with patient their Symptom Management Form for Head and Neck Cancer:     CRT regimen: concurrent bolus cisplatin, received first cycle on 4/16/24. Labs today wnl. Will plan to treat 6 fractions per week per RTOG 1016. Second cycle due tomorrow.    Tumor response: Has had an excellent response to treatment and that there is no longer an exophytic component to his tumor on transoral exam in his base of tongue as of 4/29/2024 and nodule completely resolved on 5/6/24.     FEN: patient's weight is stable. Using liquid supplements. Aim for >2000 calories and >60grams of protein daily.   All diet is oral and no feeding tube.  Met with SLP prior to starting RT and doing daily exercises to maintain swallowing function.  Met with dietician today and we rec VHC 2-3 per day and using electrolyte solution 20 oz daily.     Nausea: Reviewed use of Dexamethasone and Olanzapine after chemo each week to prevent nausea and Compazine/Zofran PRN for breakthrough nausea     Pain control: Has new odynophagia. Gave rx for oxycodone 5 mg as needed. Increase Neurontin to 900 mg 3 times daily.     Oral Care: Patient will continue fluoride trays per dentist. Patient is using Glutamine 10g TID on days of RT to promote ongoing healing of mucositis. Rec Guaifenesin for thick saliva or Xylimelt for dry mouth     Skin Care: Continue Aquaphor within the RT field. Reviewed how to apply and to avoid within 3 hours prior to RT.      Diarrhea/Constipation: Reviewed use of Immodium for diarrhea. Reviewed use of Miralax or Senna if develops constipation.

## 2024-05-07 ENCOUNTER — OFFICE VISIT (OUTPATIENT)
Dept: HEMATOLOGY/ONCOLOGY | Facility: HOSPITAL | Age: 39
End: 2024-05-07
Payer: COMMERCIAL

## 2024-05-07 ENCOUNTER — INFUSION (OUTPATIENT)
Dept: HEMATOLOGY/ONCOLOGY | Facility: HOSPITAL | Age: 39
End: 2024-05-07
Payer: COMMERCIAL

## 2024-05-07 ENCOUNTER — HOSPITAL ENCOUNTER (OUTPATIENT)
Dept: RADIATION ONCOLOGY | Facility: HOSPITAL | Age: 39
Setting detail: RADIATION/ONCOLOGY SERIES
Discharge: HOME | End: 2024-05-07
Payer: COMMERCIAL

## 2024-05-07 ENCOUNTER — LAB (OUTPATIENT)
Dept: LAB | Facility: HOSPITAL | Age: 39
End: 2024-05-07
Payer: COMMERCIAL

## 2024-05-07 VITALS
TEMPERATURE: 96.8 F | DIASTOLIC BLOOD PRESSURE: 73 MMHG | WEIGHT: 159.61 LBS | RESPIRATION RATE: 18 BRPM | OXYGEN SATURATION: 100 % | SYSTOLIC BLOOD PRESSURE: 125 MMHG | BODY MASS INDEX: 25.76 KG/M2 | HEART RATE: 105 BPM

## 2024-05-07 DIAGNOSIS — K12.33 MUCOSITIS DUE TO RADIATION THERAPY: ICD-10-CM

## 2024-05-07 DIAGNOSIS — R74.8 INCREASED CREATINE KINASE LEVEL: ICD-10-CM

## 2024-05-07 DIAGNOSIS — C10.9 MALIGNANT NEOPLASM OF OROPHARYNX (MULTI): ICD-10-CM

## 2024-05-07 DIAGNOSIS — Z51.0 ENCOUNTER FOR ANTINEOPLASTIC RADIATION THERAPY: ICD-10-CM

## 2024-05-07 DIAGNOSIS — E86.0 DEHYDRATION: ICD-10-CM

## 2024-05-07 DIAGNOSIS — C01 MALIGNANT NEOPLASM OF BASE OF TONGUE (MULTI): ICD-10-CM

## 2024-05-07 DIAGNOSIS — R79.89 ELEVATED SERUM CREATININE: ICD-10-CM

## 2024-05-07 DIAGNOSIS — R13.10 ODYNOPHAGIA: ICD-10-CM

## 2024-05-07 DIAGNOSIS — R06.6 INTRACTABLE HICCUPS: ICD-10-CM

## 2024-05-07 DIAGNOSIS — C10.9 MALIGNANT NEOPLASM OF OROPHARYNX (MULTI): Primary | ICD-10-CM

## 2024-05-07 LAB
ALBUMIN SERPL BCP-MCNC: 4.2 G/DL (ref 3.4–5)
ALP SERPL-CCNC: 47 U/L (ref 33–120)
ALT SERPL W P-5'-P-CCNC: 8 U/L (ref 10–52)
ANION GAP SERPL CALC-SCNC: 14 MMOL/L (ref 10–20)
AST SERPL W P-5'-P-CCNC: 10 U/L (ref 9–39)
BASOPHILS # BLD AUTO: 0.02 X10*3/UL (ref 0–0.1)
BASOPHILS NFR BLD AUTO: 0.7 %
BILIRUB SERPL-MCNC: 0.3 MG/DL (ref 0–1.2)
BUN SERPL-MCNC: 21 MG/DL (ref 6–23)
CALCIUM SERPL-MCNC: 9 MG/DL (ref 8.6–10.3)
CHLORIDE SERPL-SCNC: 95 MMOL/L (ref 98–107)
CO2 SERPL-SCNC: 30 MMOL/L (ref 21–32)
CREAT SERPL-MCNC: 1.3 MG/DL (ref 0.5–1.3)
EGFRCR SERPLBLD CKD-EPI 2021: 71 ML/MIN/1.73M*2
EOSINOPHIL # BLD AUTO: 0.2 X10*3/UL (ref 0–0.7)
EOSINOPHIL NFR BLD AUTO: 7.2 %
ERYTHROCYTE [DISTWIDTH] IN BLOOD BY AUTOMATED COUNT: 12.4 % (ref 11.5–14.5)
GLUCOSE SERPL-MCNC: 101 MG/DL (ref 74–99)
HCT VFR BLD AUTO: 32.7 % (ref 41–52)
HGB BLD-MCNC: 10.9 G/DL (ref 13.5–17.5)
IMM GRANULOCYTES # BLD AUTO: 0.01 X10*3/UL (ref 0–0.7)
IMM GRANULOCYTES NFR BLD AUTO: 0.4 % (ref 0–0.9)
LYMPHOCYTES # BLD AUTO: 0.16 X10*3/UL (ref 1.2–4.8)
LYMPHOCYTES NFR BLD AUTO: 5.8 %
MAGNESIUM SERPL-MCNC: 1.68 MG/DL (ref 1.6–2.4)
MCH RBC QN AUTO: 28.8 PG (ref 26–34)
MCHC RBC AUTO-ENTMCNC: 33.3 G/DL (ref 32–36)
MCV RBC AUTO: 86 FL (ref 80–100)
MONOCYTES # BLD AUTO: 0.63 X10*3/UL (ref 0.1–1)
MONOCYTES NFR BLD AUTO: 22.7 %
NEUTROPHILS # BLD AUTO: 1.76 X10*3/UL (ref 1.2–7.7)
NEUTROPHILS NFR BLD AUTO: 63.2 %
NRBC BLD-RTO: 0 /100 WBCS (ref 0–0)
PLATELET # BLD AUTO: 333 X10*3/UL (ref 150–450)
POTASSIUM SERPL-SCNC: 4.3 MMOL/L (ref 3.5–5.3)
PROT SERPL-MCNC: 7.7 G/DL (ref 6.4–8.2)
RAD ONC MSQ ACTUAL FRACTIONS DELIVERED: 19
RAD ONC MSQ ACTUAL SESSION DELIVERED DOSE: 200 CGRAY
RAD ONC MSQ ACTUAL TOTAL DOSE: 3800 CGRAY
RAD ONC MSQ ELAPSED DAYS: 21
RAD ONC MSQ LAST DATE: NORMAL
RAD ONC MSQ PRESCRIBED FRACTIONAL DOSE: 200 CGRAY
RAD ONC MSQ PRESCRIBED NUMBER OF FRACTIONS: 35
RAD ONC MSQ PRESCRIBED TECHNIQUE: NORMAL
RAD ONC MSQ PRESCRIBED TOTAL DOSE: 7000 CGRAY
RAD ONC MSQ PRESCRIPTION PATTERN COMMENT: NORMAL
RAD ONC MSQ START DATE: NORMAL
RAD ONC MSQ TREATMENT COURSE NUMBER: 1
RAD ONC MSQ TREATMENT SITE: NORMAL
RBC # BLD AUTO: 3.79 X10*6/UL (ref 4.5–5.9)
SODIUM SERPL-SCNC: 135 MMOL/L (ref 136–145)
WBC # BLD AUTO: 2.8 X10*3/UL (ref 4.4–11.3)

## 2024-05-07 PROCEDURE — 2500000004 HC RX 250 GENERAL PHARMACY W/ HCPCS (ALT 636 FOR OP/ED): Performed by: STUDENT IN AN ORGANIZED HEALTH CARE EDUCATION/TRAINING PROGRAM

## 2024-05-07 PROCEDURE — 96415 CHEMO IV INFUSION ADDL HR: CPT

## 2024-05-07 PROCEDURE — 99215 OFFICE O/P EST HI 40 MIN: CPT | Performed by: STUDENT IN AN ORGANIZED HEALTH CARE EDUCATION/TRAINING PROGRAM

## 2024-05-07 PROCEDURE — 96413 CHEMO IV INFUSION 1 HR: CPT

## 2024-05-07 PROCEDURE — 77014 CHG CT GUIDANCE RADIATION THERAPY FLDS PLACEMENT: CPT | Performed by: RADIOLOGY

## 2024-05-07 PROCEDURE — 80053 COMPREHEN METABOLIC PANEL: CPT

## 2024-05-07 PROCEDURE — 96367 TX/PROPH/DG ADDL SEQ IV INF: CPT

## 2024-05-07 PROCEDURE — 36415 COLL VENOUS BLD VENIPUNCTURE: CPT

## 2024-05-07 PROCEDURE — 77386 HC INTENSITY-MODULATED RADIATION THERAPY (IMRT), COMPLEX: CPT | Performed by: RADIOLOGY

## 2024-05-07 PROCEDURE — 96375 TX/PRO/DX INJ NEW DRUG ADDON: CPT | Mod: INF

## 2024-05-07 PROCEDURE — 85025 COMPLETE CBC W/AUTO DIFF WBC: CPT

## 2024-05-07 PROCEDURE — 83735 ASSAY OF MAGNESIUM: CPT

## 2024-05-07 PROCEDURE — 96361 HYDRATE IV INFUSION ADD-ON: CPT | Mod: INF

## 2024-05-07 RX ORDER — HEPARIN 100 UNIT/ML
500 SYRINGE INTRAVENOUS AS NEEDED
Status: CANCELLED | OUTPATIENT
Start: 2024-05-07

## 2024-05-07 RX ORDER — OLANZAPINE 5 MG/1
5 TABLET ORAL NIGHTLY
Qty: 20 TABLET | Refills: 2 | Status: SHIPPED | OUTPATIENT
Start: 2024-05-07 | End: 2024-05-07 | Stop reason: SDUPTHER

## 2024-05-07 RX ORDER — DEXAMETHASONE 4 MG/1
8 TABLET ORAL DAILY
Qty: 6 TABLET | Refills: 0 | Status: SHIPPED | OUTPATIENT
Start: 2024-05-07 | End: 2024-05-28 | Stop reason: ALTCHOICE

## 2024-05-07 RX ORDER — DIPHENHYDRAMINE HYDROCHLORIDE 50 MG/ML
50 INJECTION INTRAMUSCULAR; INTRAVENOUS AS NEEDED
Status: CANCELLED | OUTPATIENT
Start: 2024-05-07

## 2024-05-07 RX ORDER — OLANZAPINE 5 MG/1
5 TABLET ORAL NIGHTLY
Qty: 20 TABLET | Refills: 0 | Status: SHIPPED | OUTPATIENT
Start: 2024-05-07 | End: 2024-05-28 | Stop reason: ALTCHOICE

## 2024-05-07 RX ORDER — PROCHLORPERAZINE MALEATE 10 MG
10 TABLET ORAL EVERY 6 HOURS PRN
Status: DISCONTINUED | OUTPATIENT
Start: 2024-05-07 | End: 2024-05-07 | Stop reason: HOSPADM

## 2024-05-07 RX ORDER — PROCHLORPERAZINE MALEATE 10 MG
10 TABLET ORAL EVERY 6 HOURS PRN
Status: CANCELLED | OUTPATIENT
Start: 2024-05-07

## 2024-05-07 RX ORDER — HEPARIN SODIUM,PORCINE/PF 10 UNIT/ML
50 SYRINGE (ML) INTRAVENOUS AS NEEDED
Status: CANCELLED | OUTPATIENT
Start: 2024-05-07

## 2024-05-07 RX ORDER — ALBUTEROL SULFATE 0.83 MG/ML
3 SOLUTION RESPIRATORY (INHALATION) AS NEEDED
Status: CANCELLED | OUTPATIENT
Start: 2024-05-07

## 2024-05-07 RX ORDER — EPINEPHRINE 0.3 MG/.3ML
0.3 INJECTION SUBCUTANEOUS EVERY 5 MIN PRN
Status: CANCELLED | OUTPATIENT
Start: 2024-05-07

## 2024-05-07 RX ORDER — PROCHLORPERAZINE EDISYLATE 5 MG/ML
10 INJECTION INTRAMUSCULAR; INTRAVENOUS EVERY 6 HOURS PRN
Status: DISCONTINUED | OUTPATIENT
Start: 2024-05-07 | End: 2024-05-07 | Stop reason: HOSPADM

## 2024-05-07 RX ORDER — FAMOTIDINE 10 MG/ML
20 INJECTION INTRAVENOUS ONCE AS NEEDED
Status: CANCELLED | OUTPATIENT
Start: 2024-05-07

## 2024-05-07 RX ORDER — PROCHLORPERAZINE EDISYLATE 5 MG/ML
10 INJECTION INTRAMUSCULAR; INTRAVENOUS EVERY 6 HOURS PRN
Status: CANCELLED | OUTPATIENT
Start: 2024-05-07

## 2024-05-07 RX ORDER — PALONOSETRON 0.05 MG/ML
0.25 INJECTION, SOLUTION INTRAVENOUS ONCE
Status: COMPLETED | OUTPATIENT
Start: 2024-05-07 | End: 2024-05-07

## 2024-05-07 RX ORDER — PALONOSETRON 0.05 MG/ML
0.25 INJECTION, SOLUTION INTRAVENOUS ONCE
Status: CANCELLED | OUTPATIENT
Start: 2024-05-07

## 2024-05-07 RX ADMIN — PROCHLORPERAZINE EDISYLATE 10 MG: 5 INJECTION INTRAMUSCULAR; INTRAVENOUS at 16:00

## 2024-05-07 RX ADMIN — SODIUM CHLORIDE 1000 ML: 9 INJECTION, SOLUTION INTRAVENOUS at 15:00

## 2024-05-07 RX ADMIN — DEXAMETHASONE SODIUM PHOSPHATE 12 MG: 10 INJECTION, SOLUTION INTRAMUSCULAR; INTRAVENOUS at 11:45

## 2024-05-07 RX ADMIN — SODIUM CHLORIDE 150 MG: 9 INJECTION, SOLUTION INTRAVENOUS at 12:03

## 2024-05-07 RX ADMIN — POTASSIUM CHLORIDE 999 ML/HR: 2 INJECTION, SOLUTION, CONCENTRATE INTRAVENOUS at 10:45

## 2024-05-07 RX ADMIN — PALONOSETRON HYDROCHLORIDE 250 MCG: 0.25 INJECTION INTRAVENOUS at 11:45

## 2024-05-07 RX ADMIN — CISPLATIN 189 MG: 50 INJECTION, SOLUTION INTRAVENOUS at 12:40

## 2024-05-07 ASSESSMENT — PAIN SCALES - GENERAL: PAINLEVEL: 5

## 2024-05-07 ASSESSMENT — ENCOUNTER SYMPTOMS
CONSTIPATION: 1
TROUBLE SWALLOWING: 1
SORE THROAT: 1

## 2024-05-07 NOTE — PROGRESS NOTES
Pt arrived to infusion following clinic visit.  Pt had new complaints of throat pain d/t radiation, ringing in the ears, and constipation.  Med Onc team was aware per pt.  Pt met parameters for tx and tolerated infusion.  Pt made aware of future appts. And discharged in stable condition.

## 2024-05-08 ENCOUNTER — APPOINTMENT (OUTPATIENT)
Dept: RADIATION ONCOLOGY | Facility: HOSPITAL | Age: 39
End: 2024-05-08
Payer: COMMERCIAL

## 2024-05-08 ENCOUNTER — HOSPITAL ENCOUNTER (OUTPATIENT)
Dept: RADIATION ONCOLOGY | Facility: HOSPITAL | Age: 39
Setting detail: RADIATION/ONCOLOGY SERIES
Discharge: HOME | End: 2024-05-08
Payer: COMMERCIAL

## 2024-05-08 DIAGNOSIS — C01 MALIGNANT NEOPLASM OF BASE OF TONGUE (MULTI): ICD-10-CM

## 2024-05-08 DIAGNOSIS — Z51.0 ENCOUNTER FOR ANTINEOPLASTIC RADIATION THERAPY: ICD-10-CM

## 2024-05-08 LAB
RAD ONC MSQ ACTUAL FRACTIONS DELIVERED: 20
RAD ONC MSQ ACTUAL SESSION DELIVERED DOSE: 200 CGRAY
RAD ONC MSQ ACTUAL TOTAL DOSE: 4000 CGRAY
RAD ONC MSQ ELAPSED DAYS: 22
RAD ONC MSQ LAST DATE: NORMAL
RAD ONC MSQ PRESCRIBED FRACTIONAL DOSE: 200 CGRAY
RAD ONC MSQ PRESCRIBED NUMBER OF FRACTIONS: 35
RAD ONC MSQ PRESCRIBED TECHNIQUE: NORMAL
RAD ONC MSQ PRESCRIBED TOTAL DOSE: 7000 CGRAY
RAD ONC MSQ PRESCRIPTION PATTERN COMMENT: NORMAL
RAD ONC MSQ START DATE: NORMAL
RAD ONC MSQ TREATMENT COURSE NUMBER: 1
RAD ONC MSQ TREATMENT SITE: NORMAL

## 2024-05-08 PROCEDURE — 77014 CHG CT GUIDANCE RADIATION THERAPY FLDS PLACEMENT: CPT | Performed by: RADIOLOGY

## 2024-05-08 PROCEDURE — 77386 HC INTENSITY-MODULATED RADIATION THERAPY (IMRT), COMPLEX: CPT | Performed by: RADIOLOGY

## 2024-05-08 PROCEDURE — 77336 RADIATION PHYSICS CONSULT: CPT | Performed by: RADIOLOGY

## 2024-05-09 ENCOUNTER — APPOINTMENT (OUTPATIENT)
Dept: RADIATION ONCOLOGY | Facility: HOSPITAL | Age: 39
End: 2024-05-09
Payer: COMMERCIAL

## 2024-05-09 ENCOUNTER — HOSPITAL ENCOUNTER (OUTPATIENT)
Dept: RADIATION ONCOLOGY | Facility: HOSPITAL | Age: 39
Setting detail: RADIATION/ONCOLOGY SERIES
Discharge: HOME | End: 2024-05-09
Payer: COMMERCIAL

## 2024-05-09 VITALS
DIASTOLIC BLOOD PRESSURE: 72 MMHG | HEART RATE: 94 BPM | RESPIRATION RATE: 18 BRPM | SYSTOLIC BLOOD PRESSURE: 104 MMHG | TEMPERATURE: 98.2 F | OXYGEN SATURATION: 97 % | BODY MASS INDEX: 26.52 KG/M2 | WEIGHT: 164.3 LBS

## 2024-05-09 DIAGNOSIS — Z51.0 ENCOUNTER FOR ANTINEOPLASTIC RADIATION THERAPY: ICD-10-CM

## 2024-05-09 DIAGNOSIS — C01 MALIGNANT NEOPLASM OF BASE OF TONGUE (MULTI): ICD-10-CM

## 2024-05-09 LAB
RAD ONC MSQ ACTUAL FRACTIONS DELIVERED: 21
RAD ONC MSQ ACTUAL FRACTIONS DELIVERED: 22
RAD ONC MSQ ACTUAL SESSION DELIVERED DOSE: 200 CGRAY
RAD ONC MSQ ACTUAL SESSION DELIVERED DOSE: 200 CGRAY
RAD ONC MSQ ACTUAL TOTAL DOSE: 4200 CGRAY
RAD ONC MSQ ACTUAL TOTAL DOSE: 4400 CGRAY
RAD ONC MSQ ELAPSED DAYS: 23
RAD ONC MSQ ELAPSED DAYS: 23
RAD ONC MSQ LAST DATE: NORMAL
RAD ONC MSQ LAST DATE: NORMAL
RAD ONC MSQ PRESCRIBED FRACTIONAL DOSE: 200 CGRAY
RAD ONC MSQ PRESCRIBED FRACTIONAL DOSE: 200 CGRAY
RAD ONC MSQ PRESCRIBED NUMBER OF FRACTIONS: 35
RAD ONC MSQ PRESCRIBED NUMBER OF FRACTIONS: 35
RAD ONC MSQ PRESCRIBED TECHNIQUE: NORMAL
RAD ONC MSQ PRESCRIBED TECHNIQUE: NORMAL
RAD ONC MSQ PRESCRIBED TOTAL DOSE: 7000 CGRAY
RAD ONC MSQ PRESCRIBED TOTAL DOSE: 7000 CGRAY
RAD ONC MSQ PRESCRIPTION PATTERN COMMENT: NORMAL
RAD ONC MSQ PRESCRIPTION PATTERN COMMENT: NORMAL
RAD ONC MSQ START DATE: NORMAL
RAD ONC MSQ START DATE: NORMAL
RAD ONC MSQ TREATMENT COURSE NUMBER: 1
RAD ONC MSQ TREATMENT COURSE NUMBER: 1
RAD ONC MSQ TREATMENT SITE: NORMAL
RAD ONC MSQ TREATMENT SITE: NORMAL

## 2024-05-09 PROCEDURE — 77386 HC INTENSITY-MODULATED RADIATION THERAPY (IMRT), COMPLEX: CPT | Mod: XE | Performed by: RADIOLOGY

## 2024-05-09 PROCEDURE — 77386 HC INTENSITY-MODULATED RADIATION THERAPY (IMRT), COMPLEX: CPT | Performed by: RADIOLOGY

## 2024-05-09 PROCEDURE — 77014 CHG CT GUIDANCE RADIATION THERAPY FLDS PLACEMENT: CPT | Performed by: RADIOLOGY

## 2024-05-10 ENCOUNTER — APPOINTMENT (OUTPATIENT)
Dept: HEMATOLOGY/ONCOLOGY | Facility: HOSPITAL | Age: 39
End: 2024-05-10
Payer: COMMERCIAL

## 2024-05-10 ENCOUNTER — HOSPITAL ENCOUNTER (OUTPATIENT)
Dept: RADIATION ONCOLOGY | Facility: HOSPITAL | Age: 39
Setting detail: RADIATION/ONCOLOGY SERIES
Discharge: HOME | End: 2024-05-10
Payer: COMMERCIAL

## 2024-05-10 ENCOUNTER — APPOINTMENT (OUTPATIENT)
Dept: RADIATION ONCOLOGY | Facility: HOSPITAL | Age: 39
End: 2024-05-10
Payer: COMMERCIAL

## 2024-05-10 ENCOUNTER — INFUSION (OUTPATIENT)
Dept: HEMATOLOGY/ONCOLOGY | Facility: HOSPITAL | Age: 39
End: 2024-05-10
Payer: COMMERCIAL

## 2024-05-10 VITALS
RESPIRATION RATE: 18 BRPM | WEIGHT: 159 LBS | SYSTOLIC BLOOD PRESSURE: 136 MMHG | DIASTOLIC BLOOD PRESSURE: 75 MMHG | TEMPERATURE: 99.5 F | BODY MASS INDEX: 25.66 KG/M2 | OXYGEN SATURATION: 99 % | HEART RATE: 97 BPM

## 2024-05-10 DIAGNOSIS — C10.9 MALIGNANT NEOPLASM OF OROPHARYNX (MULTI): ICD-10-CM

## 2024-05-10 DIAGNOSIS — T45.1X5A CHEMOTHERAPY INDUCED NAUSEA AND VOMITING: Primary | ICD-10-CM

## 2024-05-10 DIAGNOSIS — Z51.0 ENCOUNTER FOR ANTINEOPLASTIC RADIATION THERAPY: ICD-10-CM

## 2024-05-10 DIAGNOSIS — C01 MALIGNANT NEOPLASM OF BASE OF TONGUE (MULTI): ICD-10-CM

## 2024-05-10 DIAGNOSIS — R11.2 CHEMOTHERAPY INDUCED NAUSEA AND VOMITING: ICD-10-CM

## 2024-05-10 DIAGNOSIS — T45.1X5A CHEMOTHERAPY INDUCED NAUSEA AND VOMITING: ICD-10-CM

## 2024-05-10 DIAGNOSIS — R11.2 CHEMOTHERAPY INDUCED NAUSEA AND VOMITING: Primary | ICD-10-CM

## 2024-05-10 LAB
RAD ONC MSQ ACTUAL FRACTIONS DELIVERED: 23
RAD ONC MSQ ACTUAL SESSION DELIVERED DOSE: 200 CGRAY
RAD ONC MSQ ACTUAL TOTAL DOSE: 4600 CGRAY
RAD ONC MSQ ELAPSED DAYS: 24
RAD ONC MSQ LAST DATE: NORMAL
RAD ONC MSQ PRESCRIBED FRACTIONAL DOSE: 200 CGRAY
RAD ONC MSQ PRESCRIBED NUMBER OF FRACTIONS: 35
RAD ONC MSQ PRESCRIBED TECHNIQUE: NORMAL
RAD ONC MSQ PRESCRIBED TOTAL DOSE: 7000 CGRAY
RAD ONC MSQ PRESCRIPTION PATTERN COMMENT: NORMAL
RAD ONC MSQ START DATE: NORMAL
RAD ONC MSQ TREATMENT COURSE NUMBER: 1
RAD ONC MSQ TREATMENT SITE: NORMAL

## 2024-05-10 PROCEDURE — 2500000004 HC RX 250 GENERAL PHARMACY W/ HCPCS (ALT 636 FOR OP/ED): Performed by: INTERNAL MEDICINE

## 2024-05-10 PROCEDURE — 77014 CHG CT GUIDANCE RADIATION THERAPY FLDS PLACEMENT: CPT | Performed by: RADIOLOGY

## 2024-05-10 PROCEDURE — 77386 HC INTENSITY-MODULATED RADIATION THERAPY (IMRT), COMPLEX: CPT | Performed by: RADIOLOGY

## 2024-05-10 PROCEDURE — 2500000004 HC RX 250 GENERAL PHARMACY W/ HCPCS (ALT 636 FOR OP/ED): Performed by: STUDENT IN AN ORGANIZED HEALTH CARE EDUCATION/TRAINING PROGRAM

## 2024-05-10 PROCEDURE — 96361 HYDRATE IV INFUSION ADD-ON: CPT | Mod: INF

## 2024-05-10 PROCEDURE — 96360 HYDRATION IV INFUSION INIT: CPT | Mod: INF

## 2024-05-10 PROCEDURE — 96374 THER/PROPH/DIAG INJ IV PUSH: CPT | Mod: INF

## 2024-05-10 RX ORDER — ONDANSETRON HYDROCHLORIDE 2 MG/ML
4 INJECTION, SOLUTION INTRAVENOUS ONCE
Status: COMPLETED | OUTPATIENT
Start: 2024-05-10 | End: 2024-05-10

## 2024-05-10 RX ORDER — ONDANSETRON HYDROCHLORIDE 2 MG/ML
4 INJECTION, SOLUTION INTRAVENOUS ONCE
Status: CANCELLED | OUTPATIENT
Start: 2024-05-10 | End: 2024-05-10

## 2024-05-10 RX ADMIN — ONDANSETRON 4 MG: 2 INJECTION INTRAMUSCULAR; INTRAVENOUS at 15:25

## 2024-05-10 RX ADMIN — SODIUM CHLORIDE 1000 ML: 9 INJECTION, SOLUTION INTRAVENOUS at 15:21

## 2024-05-10 ASSESSMENT — PAIN SCALES - GENERAL: PAINLEVEL: 0-NO PAIN

## 2024-05-10 NOTE — PROGRESS NOTES
Pilo came for his hydration. He was a little nauseous. Dr Burkitt informed and ordered zofran IV.  Patient tolerated his fluids. Patient also was 37.8 , Dr Burkitt informed. Instructed the patient to go to ED or call his Dr if he started having fever. His temp was 37.5 before he left. His nausea was a little better too.   03-Sep-2020 18:08 04-Sep-2020 02:02 04-Sep-2020 09:29

## 2024-05-11 ENCOUNTER — TELEPHONE (OUTPATIENT)
Dept: HEMATOLOGY/ONCOLOGY | Facility: HOSPITAL | Age: 39
End: 2024-05-11
Payer: COMMERCIAL

## 2024-05-12 NOTE — TELEPHONE ENCOUNTER
Received a telephone call back request at 9pm from wife of Pilo Hanks regarding several loose stools (3 in the past 24 hours). No hematochezia. It is difficult for him to swallow. He is not febrile. Patient took Immodium an hour ago.    He also had an episode of vomiting, and he has taken Zofran.    The caller was counseled over the phone to come to the ER if things get worse overnight or if he develops a fever.    All questions were answered, and the caller voiced understanding.    Tremayne Isaacs MD  Hematology/Oncology Fellow  5/11/2024

## 2024-05-13 ENCOUNTER — APPOINTMENT (OUTPATIENT)
Dept: RADIATION ONCOLOGY | Facility: HOSPITAL | Age: 39
End: 2024-05-13
Payer: COMMERCIAL

## 2024-05-13 ENCOUNTER — RADIATION ONCOLOGY OTV (OUTPATIENT)
Dept: RADIATION ONCOLOGY | Facility: HOSPITAL | Age: 39
End: 2024-05-13
Payer: COMMERCIAL

## 2024-05-13 ENCOUNTER — HOSPITAL ENCOUNTER (OUTPATIENT)
Dept: RADIATION ONCOLOGY | Facility: HOSPITAL | Age: 39
Setting detail: RADIATION/ONCOLOGY SERIES
Discharge: HOME | End: 2024-05-13
Payer: COMMERCIAL

## 2024-05-13 ENCOUNTER — INFUSION (OUTPATIENT)
Dept: HEMATOLOGY/ONCOLOGY | Facility: HOSPITAL | Age: 39
End: 2024-05-13
Payer: COMMERCIAL

## 2024-05-13 VITALS
HEART RATE: 85 BPM | WEIGHT: 154.76 LBS | DIASTOLIC BLOOD PRESSURE: 75 MMHG | TEMPERATURE: 97.2 F | OXYGEN SATURATION: 98 % | SYSTOLIC BLOOD PRESSURE: 107 MMHG | RESPIRATION RATE: 18 BRPM | BODY MASS INDEX: 24.98 KG/M2

## 2024-05-13 DIAGNOSIS — C01 MALIGNANT NEOPLASM OF BASE OF TONGUE (MULTI): ICD-10-CM

## 2024-05-13 DIAGNOSIS — C10.9 MALIGNANT NEOPLASM OF OROPHARYNX (MULTI): ICD-10-CM

## 2024-05-13 DIAGNOSIS — Z51.0 ENCOUNTER FOR ANTINEOPLASTIC RADIATION THERAPY: ICD-10-CM

## 2024-05-13 LAB
RAD ONC MSQ ACTUAL FRACTIONS DELIVERED: 24
RAD ONC MSQ ACTUAL SESSION DELIVERED DOSE: 200 CGRAY
RAD ONC MSQ ACTUAL TOTAL DOSE: 4800 CGRAY
RAD ONC MSQ ELAPSED DAYS: 27
RAD ONC MSQ LAST DATE: NORMAL
RAD ONC MSQ PRESCRIBED FRACTIONAL DOSE: 200 CGRAY
RAD ONC MSQ PRESCRIBED NUMBER OF FRACTIONS: 35
RAD ONC MSQ PRESCRIBED TECHNIQUE: NORMAL
RAD ONC MSQ PRESCRIBED TOTAL DOSE: 7000 CGRAY
RAD ONC MSQ PRESCRIPTION PATTERN COMMENT: NORMAL
RAD ONC MSQ START DATE: NORMAL
RAD ONC MSQ TREATMENT COURSE NUMBER: 1
RAD ONC MSQ TREATMENT SITE: NORMAL

## 2024-05-13 PROCEDURE — 96360 HYDRATION IV INFUSION INIT: CPT | Mod: INF

## 2024-05-13 PROCEDURE — 77427 RADIATION TX MANAGEMENT X5: CPT | Performed by: RADIOLOGY

## 2024-05-13 PROCEDURE — 77014 CHG CT GUIDANCE RADIATION THERAPY FLDS PLACEMENT: CPT | Performed by: RADIOLOGY

## 2024-05-13 PROCEDURE — 2500000004 HC RX 250 GENERAL PHARMACY W/ HCPCS (ALT 636 FOR OP/ED): Performed by: STUDENT IN AN ORGANIZED HEALTH CARE EDUCATION/TRAINING PROGRAM

## 2024-05-13 PROCEDURE — 77386 HC INTENSITY-MODULATED RADIATION THERAPY (IMRT), COMPLEX: CPT | Performed by: RADIOLOGY

## 2024-05-13 RX ORDER — HEPARIN 100 UNIT/ML
500 SYRINGE INTRAVENOUS AS NEEDED
Status: CANCELLED | OUTPATIENT
Start: 2024-05-13

## 2024-05-13 RX ORDER — HEPARIN SODIUM,PORCINE/PF 10 UNIT/ML
50 SYRINGE (ML) INTRAVENOUS AS NEEDED
Status: CANCELLED | OUTPATIENT
Start: 2024-05-13

## 2024-05-13 RX ADMIN — SODIUM CHLORIDE 1000 ML: 9 INJECTION, SOLUTION INTRAVENOUS at 11:20

## 2024-05-13 ASSESSMENT — PAIN SCALES - GENERAL: PAINLEVEL: 1

## 2024-05-13 NOTE — PROGRESS NOTES
Patient arrived ambulatory to infusion for scheduled tx of IV hydration. Endorses nausea / diarrhea that started over the weekend, nausea improved with antiemetics and diarrhea continues despite imodium, having about 3 loose stools/ day, Dr. Burkitt made aware. Tolerated infusion without issue. Discharged in stable condition.

## 2024-05-13 NOTE — PROGRESS NOTES
Radiation Oncology On Treatment Visit    Patient Name:  Pilo Hanks  MRN:  [id number]  :  1985    Referring Provider: No ref. provider found  Primary Care Provider: Ruth Villa DO  Care Team: Patient Care Team:  Ruth Villa DO as PCP - General  Ruth Villa DO as PCP - [place] [facility] PCP  Raquel Blackman MD as Radiation Oncologist (Radiation Oncology)  Kyunghee Burkitt, DO as Consulting Physician (Hematology and Oncology)  Rosina Fitzgerald PA-C as Physician Assistant (Hematology and Oncology)    Date of Service: 2024     Diagnosis:   Specialty Problems          Radiation Oncology Problems    Malignant neoplasm of oropharynx (Multi)         Treatment Summary:  IMRT: Bilateral Head and neck    Treatment Period Technique Fraction Dose Fractions Total Dose   Course 1 2024-2024  (days elapsed: 27)         BOT + nodes 2024-2024 VMAT 200 / 200 cGy 24 /  4800 / 7,000 cGy     SUBJECTIVE: Patient reports more pain, that is up to a 6/10 in severity with swallowing, decreased to 3/10 after oxycodone 5mg. He is only able to drink and eat moist foods currently. Has trouble swallowing dry solids. No dizziness.     Underwent bolus cisplatin last week and had severe nausea over the weekend. Not eating much. Took Zofran with minimal relief, but nausea stopped this morning and he also stopped Zofram.     OBJECTIVE:   Vital Signs:  /75   Pulse 85   Temp 36.2 °C (97.2 °F)   Resp 18   Wt 70.2 kg (154 lb 12.2 oz)   SpO2 98%   BMI 24.98 kg/m²    Pain Scale: The patient's current pain level was assessed.  They report currently having a pain of 2 out of 10.    Other Pertinent Findings:   L BOT tumor has shrunk tremendously on transoral exam. This week nodule has completely resolved. but  mucositis in posterior oropharynx, thrush or desquamation.   Weight is down 7 lbs.  Toxicity Assessment          2024    15:06 2024    12:30 2024    10:06 2024    10:11 2024    10:23    Toxicity Assessment   Adverse Events Reviewed (WDL) No (Exceptions to WDL) No (Exceptions to WDL) No (Exceptions to WDL) No (Exceptions to WDL) No (Exceptions to WDL)   Treatment  and neck Head and neck Head and neck Head and neck Head and neck   Anorexia Grade 0       First weight during .6lbs Grade 0 Grade 0       loss of .2lbs since last week, PO intake still normal Grade 1       increase in painful swallowing, loss of 1.5lbs since last week. BMX? Grade 2       loss of 7.25lbs since last week, Dietician following   Dehydration Grade 0       oral intake of fluids WNL Grade 1       Pt received IV hydration today Grade 0       BP WNL Grade 1       Ortho positive. fluids encouraged. Grade 1       getting IV hydration, PO intake trying to keep up   Dermatitis Radiation Grade 0       creams provided Grade 0       creams at home Grade 0       creams at home Grade 1       Some slight redness/darkening. Creams provided and at home Grade 1       skin darkening, Creams at home   Diarrhea   Grade 0 Grade 0       Constipation at times, Mirilax at home    Fatigue Grade 0 Grade 0 Grade 1       Tired over the weekend, Rest and activity balance Grade 1       Not very tired but energy level is lower. Rest and activity balance Grade 1       Slept more than normal over the weekend. Rest and activity balance   Nausea Grade 0       Meds at home Grade 0 Grade 0 Grade 0 Grade 1       over the past weekend, Zofran helping   Pain Grade 0       Gabapentin at home, and started today Grade 1       increase in Gabapentin dose Grade 1       Back of mouth/throat L side, Gabapentin at home Grade 1       Throat/Mouth Grade 1       Mouth/throat discomfort   Vomiting Grade 0 Grade 0 Grade 0 Grade 0 Grade 1       episodes over the weekend, Meds at home   Dysphagia Grade 0 Grade 1       dry mouth Grade 1       increase in painfull swallowing Grade 1       increase in pain/possible aspiration. SLP following Grade 1       thick mucous,  worried about choking   Mucositis Oral Grade 0       Glutamine at home, already started 1 week prior to RT Grade 0       Glutamine at home Grade 0       Glutamine at home Grade 1       Sore present with general redness, Glutamine at home Grade 1       redness and sores present, Glutamine at home   Hearing Impaired   Grade 0     Blurred Vision Grade 0       Dry Mouth Grade 0 Grade 1       throughout the day, pt sipping water helps. Grade 1       Sipping water, worse in the morning Grade 1       Worse in the AM, Gultamine and Humidifier encouraged Grade 1       Worse is the AM, Glutamine at home   Ear Pain Grade 0       Tinnitus Grade 0       Oral Pain Grade 0 Grade 0 Grade 0 Grade 1       increase in burning while eating Grade 1       some sores, Gabapentin at home   Aspiration Grade 0 Grade 0 Grade 0 Grade 0 Grade 0   Hoarseness Grade 0 Grade 0 Grade 0 Grade 0 Grade 0   Voice Alteration Grade 0 Grade 0 Grade 0 Grade 0 Grade 0        Assessment / Plan:  The patient is tolerating radiation therapy as anticipated.  Continue per current treatment plan.      Reviewed with patient their Symptom Management Form for Head and Neck Cancer:     CRT regimen: concurrent bolus cisplatin, received first cycle on 4/16/24. Labs today wnl. Will plan to treat 6 fractions per week per RTOG 1016. Second cycle due tomorrow.     Tumor response: Has had an excellent response to treatment and that there is no longer an exophytic component to his tumor on transoral exam in his base of tongue as of 4/29/2024 and nodule completely resolved on 5/6/24.     FEN: patient's weight is stable. Using liquid supplements. Aim for >2000 calories and >60grams of protein daily.   All diet is oral and no feeding tube.  Met with SLP prior to starting RT and doing daily exercises to maintain swallowing function.  Met with dietician today and we rec VHC 2-3 per day and using electrolyte solution 20 oz daily.     Nausea: Reviewed use of Dexamethasone and  Olanzapine after chemo each week to prevent nausea and Compazine/Zofran PRN for breakthrough nausea. Will get IVF this week to help with post chemo dehydration.     Pain control: Has new odynophagia. Gave rx for oxycodone 5 mg as needed. Taking Neurontin to 900 mg 3 times daily.     Oral Care: Patient will continue fluoride trays per dentist. Patient is using Glutamine 10g TID on days of RT to promote ongoing healing of mucositis. Rec Guaifenesin for thick saliva or Xylimelt for dry mouth     Skin Care: Continue Aquaphor within the RT field. Reviewed how to apply and to avoid within 3 hours prior to RT.      Diarrhea/Constipation: Reviewed use of Immodium for diarrhea. Reviewed use of Miralax or Senna if develops constipation.

## 2024-05-14 ENCOUNTER — HOSPITAL ENCOUNTER (OUTPATIENT)
Dept: RADIATION ONCOLOGY | Facility: HOSPITAL | Age: 39
Setting detail: RADIATION/ONCOLOGY SERIES
Discharge: HOME | End: 2024-05-14
Payer: COMMERCIAL

## 2024-05-14 ENCOUNTER — APPOINTMENT (OUTPATIENT)
Dept: RADIATION ONCOLOGY | Facility: HOSPITAL | Age: 39
End: 2024-05-14
Payer: COMMERCIAL

## 2024-05-14 DIAGNOSIS — Z51.0 ENCOUNTER FOR ANTINEOPLASTIC RADIATION THERAPY: ICD-10-CM

## 2024-05-14 DIAGNOSIS — C01 MALIGNANT NEOPLASM OF BASE OF TONGUE (MULTI): ICD-10-CM

## 2024-05-14 LAB
RAD ONC MSQ ACTUAL FRACTIONS DELIVERED: 25
RAD ONC MSQ ACTUAL SESSION DELIVERED DOSE: 200 CGRAY
RAD ONC MSQ ACTUAL TOTAL DOSE: 5000 CGRAY
RAD ONC MSQ ELAPSED DAYS: 28
RAD ONC MSQ LAST DATE: NORMAL
RAD ONC MSQ PRESCRIBED FRACTIONAL DOSE: 200 CGRAY
RAD ONC MSQ PRESCRIBED NUMBER OF FRACTIONS: 35
RAD ONC MSQ PRESCRIBED TECHNIQUE: NORMAL
RAD ONC MSQ PRESCRIBED TOTAL DOSE: 7000 CGRAY
RAD ONC MSQ PRESCRIPTION PATTERN COMMENT: NORMAL
RAD ONC MSQ START DATE: NORMAL
RAD ONC MSQ TREATMENT COURSE NUMBER: 1
RAD ONC MSQ TREATMENT SITE: NORMAL

## 2024-05-14 PROCEDURE — 77014 CHG CT GUIDANCE RADIATION THERAPY FLDS PLACEMENT: CPT | Performed by: RADIOLOGY

## 2024-05-14 PROCEDURE — 77336 RADIATION PHYSICS CONSULT: CPT | Performed by: RADIOLOGY

## 2024-05-14 PROCEDURE — 77386 HC INTENSITY-MODULATED RADIATION THERAPY (IMRT), COMPLEX: CPT | Performed by: RADIOLOGY

## 2024-05-15 ENCOUNTER — INFUSION (OUTPATIENT)
Dept: HEMATOLOGY/ONCOLOGY | Facility: HOSPITAL | Age: 39
End: 2024-05-15
Payer: COMMERCIAL

## 2024-05-15 ENCOUNTER — HOSPITAL ENCOUNTER (OUTPATIENT)
Dept: RADIATION ONCOLOGY | Facility: HOSPITAL | Age: 39
Setting detail: RADIATION/ONCOLOGY SERIES
Discharge: HOME | End: 2024-05-15
Payer: COMMERCIAL

## 2024-05-15 ENCOUNTER — OFFICE VISIT (OUTPATIENT)
Dept: HEMATOLOGY/ONCOLOGY | Facility: HOSPITAL | Age: 39
End: 2024-05-15
Payer: COMMERCIAL

## 2024-05-15 ENCOUNTER — APPOINTMENT (OUTPATIENT)
Dept: RADIATION ONCOLOGY | Facility: HOSPITAL | Age: 39
End: 2024-05-15
Payer: COMMERCIAL

## 2024-05-15 ENCOUNTER — LAB (OUTPATIENT)
Dept: LAB | Facility: HOSPITAL | Age: 39
End: 2024-05-15
Payer: COMMERCIAL

## 2024-05-15 VITALS
WEIGHT: 150.6 LBS | DIASTOLIC BLOOD PRESSURE: 74 MMHG | TEMPERATURE: 97.5 F | OXYGEN SATURATION: 100 % | BODY MASS INDEX: 24.31 KG/M2 | RESPIRATION RATE: 16 BRPM | HEART RATE: 80 BPM | SYSTOLIC BLOOD PRESSURE: 131 MMHG

## 2024-05-15 DIAGNOSIS — C10.9 MALIGNANT NEOPLASM OF OROPHARYNX (MULTI): Primary | ICD-10-CM

## 2024-05-15 DIAGNOSIS — Z00.00 WELLNESS EXAMINATION: ICD-10-CM

## 2024-05-15 DIAGNOSIS — C10.9 MALIGNANT NEOPLASM OF OROPHARYNX (MULTI): ICD-10-CM

## 2024-05-15 DIAGNOSIS — Z51.0 ENCOUNTER FOR ANTINEOPLASTIC RADIATION THERAPY: ICD-10-CM

## 2024-05-15 DIAGNOSIS — C01 MALIGNANT NEOPLASM OF BASE OF TONGUE (MULTI): ICD-10-CM

## 2024-05-15 LAB
ALBUMIN SERPL BCP-MCNC: 4.3 G/DL (ref 3.4–5)
ALP SERPL-CCNC: 51 U/L (ref 33–120)
ALT SERPL W P-5'-P-CCNC: 16 U/L (ref 10–52)
ANION GAP SERPL CALC-SCNC: 16 MMOL/L (ref 10–20)
AST SERPL W P-5'-P-CCNC: 14 U/L (ref 9–39)
BASOPHILS # BLD AUTO: 0.03 X10*3/UL (ref 0–0.1)
BASOPHILS NFR BLD AUTO: 0.8 %
BILIRUB SERPL-MCNC: 0.4 MG/DL (ref 0–1.2)
BUN SERPL-MCNC: 28 MG/DL (ref 6–23)
CALCIUM SERPL-MCNC: 9 MG/DL (ref 8.6–10.3)
CHLORIDE SERPL-SCNC: 98 MMOL/L (ref 98–107)
CO2 SERPL-SCNC: 27 MMOL/L (ref 21–32)
CREAT SERPL-MCNC: 1.7 MG/DL (ref 0.5–1.3)
EGFRCR SERPLBLD CKD-EPI 2021: 52 ML/MIN/1.73M*2
EOSINOPHIL # BLD AUTO: 0.02 X10*3/UL (ref 0–0.7)
EOSINOPHIL NFR BLD AUTO: 0.5 %
ERYTHROCYTE [DISTWIDTH] IN BLOOD BY AUTOMATED COUNT: 11.9 % (ref 11.5–14.5)
GLUCOSE SERPL-MCNC: 89 MG/DL (ref 74–99)
HCT VFR BLD AUTO: 30.6 % (ref 41–52)
HGB BLD-MCNC: 10.4 G/DL (ref 13.5–17.5)
IMM GRANULOCYTES # BLD AUTO: 0.02 X10*3/UL (ref 0–0.7)
IMM GRANULOCYTES NFR BLD AUTO: 0.5 % (ref 0–0.9)
LYMPHOCYTES # BLD AUTO: 0.17 X10*3/UL (ref 1.2–4.8)
LYMPHOCYTES NFR BLD AUTO: 4.4 %
MAGNESIUM SERPL-MCNC: 1.49 MG/DL (ref 1.6–2.4)
MCH RBC QN AUTO: 28.8 PG (ref 26–34)
MCHC RBC AUTO-ENTMCNC: 34 G/DL (ref 32–36)
MCV RBC AUTO: 85 FL (ref 80–100)
MONOCYTES # BLD AUTO: 0.37 X10*3/UL (ref 0.1–1)
MONOCYTES NFR BLD AUTO: 9.6 %
NEUTROPHILS # BLD AUTO: 3.23 X10*3/UL (ref 1.2–7.7)
NEUTROPHILS NFR BLD AUTO: 84.2 %
NRBC BLD-RTO: 0 /100 WBCS (ref 0–0)
PLATELET # BLD AUTO: 188 X10*3/UL (ref 150–450)
POTASSIUM SERPL-SCNC: 4.4 MMOL/L (ref 3.5–5.3)
PROT SERPL-MCNC: 7.5 G/DL (ref 6.4–8.2)
RAD ONC MSQ ACTUAL FRACTIONS DELIVERED: 26
RAD ONC MSQ ACTUAL SESSION DELIVERED DOSE: 200 CGRAY
RAD ONC MSQ ACTUAL TOTAL DOSE: 5200 CGRAY
RAD ONC MSQ ELAPSED DAYS: 29
RAD ONC MSQ LAST DATE: NORMAL
RAD ONC MSQ PRESCRIBED FRACTIONAL DOSE: 200 CGRAY
RAD ONC MSQ PRESCRIBED NUMBER OF FRACTIONS: 35
RAD ONC MSQ PRESCRIBED TECHNIQUE: NORMAL
RAD ONC MSQ PRESCRIBED TOTAL DOSE: 7000 CGRAY
RAD ONC MSQ PRESCRIPTION PATTERN COMMENT: NORMAL
RAD ONC MSQ START DATE: NORMAL
RAD ONC MSQ TREATMENT COURSE NUMBER: 1
RAD ONC MSQ TREATMENT SITE: NORMAL
RBC # BLD AUTO: 3.61 X10*6/UL (ref 4.5–5.9)
SODIUM SERPL-SCNC: 137 MMOL/L (ref 136–145)
WBC # BLD AUTO: 3.8 X10*3/UL (ref 4.4–11.3)

## 2024-05-15 PROCEDURE — 2500000004 HC RX 250 GENERAL PHARMACY W/ HCPCS (ALT 636 FOR OP/ED): Performed by: INTERNAL MEDICINE

## 2024-05-15 PROCEDURE — 77014 CHG CT GUIDANCE RADIATION THERAPY FLDS PLACEMENT: CPT | Performed by: RADIOLOGY

## 2024-05-15 PROCEDURE — 96360 HYDRATION IV INFUSION INIT: CPT | Mod: INF

## 2024-05-15 PROCEDURE — 77386 HC INTENSITY-MODULATED RADIATION THERAPY (IMRT), COMPLEX: CPT | Performed by: RADIOLOGY

## 2024-05-15 PROCEDURE — 85025 COMPLETE CBC W/AUTO DIFF WBC: CPT

## 2024-05-15 PROCEDURE — 83735 ASSAY OF MAGNESIUM: CPT

## 2024-05-15 PROCEDURE — 99214 OFFICE O/P EST MOD 30 MIN: CPT | Performed by: INTERNAL MEDICINE

## 2024-05-15 PROCEDURE — 77336 RADIATION PHYSICS CONSULT: CPT | Performed by: RADIOLOGY

## 2024-05-15 PROCEDURE — 84075 ASSAY ALKALINE PHOSPHATASE: CPT

## 2024-05-15 PROCEDURE — 36415 COLL VENOUS BLD VENIPUNCTURE: CPT

## 2024-05-15 RX ORDER — LANOLIN ALCOHOL/MO/W.PET/CERES
400 CREAM (GRAM) TOPICAL DAILY
Qty: 30 TABLET | Refills: 2 | Status: SHIPPED | OUTPATIENT
Start: 2024-05-15 | End: 2024-06-01 | Stop reason: HOSPADM

## 2024-05-15 RX ADMIN — SODIUM CHLORIDE 1000 ML: 9 INJECTION, SOLUTION INTRAVENOUS at 12:20

## 2024-05-15 ASSESSMENT — ENCOUNTER SYMPTOMS
ARTHRALGIAS: 0
CHILLS: 0
LEG SWELLING: 0
TROUBLE SWALLOWING: 1
FEVER: 0
NAUSEA: 0
SHORTNESS OF BREATH: 0
COUGH: 0
LIGHT-HEADEDNESS: 0
HEADACHES: 0
SORE THROAT: 1
CONSTIPATION: 1
NUMBNESS: 0
ABDOMINAL PAIN: 0
VOMITING: 0

## 2024-05-15 ASSESSMENT — PAIN SCALES - GENERAL: PAINLEVEL: 0-NO PAIN

## 2024-05-15 NOTE — PROGRESS NOTES
Pt arrived ambulatory to infusion for hydration. Denies new or worsening symptoms. Tolerated infusion without issue.  Discharged in stable condition.

## 2024-05-15 NOTE — PROGRESS NOTES
Patient ID: Pilo Hanks is a 39 y.o. male.  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 Phi Cruz  MedOnc:   Dr. Kyunghee Burkitt/Shaila Fitzgerald PA-C  RadOnc: Dr. Raquel Blackman    Current Therapy  4/16/24:     Sites of Disease  Left BOT  B/L cervical LNs    Oncologic Issues    ONCOLOGIC HISTORY  - 02/2024: Presented to PCP with mass in left tonsillar area. Referred to ENT  - 3/15/24: In office biopsy of left BOT w/ Dr Cruz.  Path showed p16 positive SCC  - 3/13/29/24: CT neck and chest showed a soft tissue mass within the left oropharynx centered in the anterior tonsillar pillar/left base of tongue, measures approximately 1.8 x 1.4cm.  - 4/1/24: PET/CT showed multiple hypermetabolic bilateral multilevel cervical lymphadenopathy, largest of which located at left cervical level 2, ~1.2cm.  - 4/5/24 [facility]: Rec surgical resection vs chemoRT vs clinical trials , [id number]    Past Medical History:   Past Medical History:  01/21/2016: Acute tonsillitis, unspecified      Comment:  Acute tonsillitis, unspecified etiology  01/11/2017: Hypertrophy of tonsils      Comment:  Lingual tonsil hypertrophy  01/05/2017: Hypertrophy of tonsils      Comment:  Tonsillar hypertrophy  No date: Other specified health status      Comment:  No pertinent past medical history  09/14/2021: Pain in right toe(s)      Comment:  Great toe pain, right  09/15/2021: Pain in unspecified ankle and joints of unspecified foot      Comment:  Ankle pain  07/27/2021: Personal history of other diseases of the nervous system   and sense organs      Comment:  History of obstructive sleep apnea  07/27/2021: Personal history of other diseases of the respiratory   system      Comment:  History of acute pharyngitis  01/21/2016: Personal history of other diseases of the respiratory   system      Comment:  History of sore throat  01/05/2017: Personal history  of other diseases of the respiratory   system      Comment:  History of airway obstruction  2021: Personal history of other specified conditions      Comment:  History of chest pain  2021: Sprain of joints and ligaments of unspecified parts of   neck, initial encounter      Comment:  Neck sprain  2017: Sprain of unspecified ligament of right ankle, initial   encounter      Comment:  Sprain of right ankle, unspecified ligament, initial                encounter   Surgical History:    Past Surgical History:   Procedure Laterality Date    MOUTH SURGERY  2017    Oral Surgery Tooth Extraction      Family History:    Family History   Problem Relation Name Age of Onset    No Known Problems Mother      No Known Problems Father      Diabetes Paternal Grandfather      Heart failure Paternal Grandfather       Family Oncology History:    Cancer-related family history is not on file.  Social History:    Social History     Tobacco Use    Smoking status: Former     Current packs/day: 0.00     Average packs/day: 0.3 packs/day for 15.0 years (3.8 ttl pk-yrs)     Types: Cigarettes     Start date:      Quit date:      Years since quittin.3    Smokeless tobacco: Never   Vaping Use    Vaping status: Never Used   Substance Use Topics    Alcohol use: Not Currently    Drug use: Yes     Types: Marijuana     Comment: Edibles          Subjective   Chief Complaint: Squamous Cell Carcinoma of left base of tongue    HPI  Interval History  Pilo Hanks is a 39 y.o. male with recent diagnosis of T1N2M0 P16+ Squamous Cell Carcinoma of left BOT. Who is started chemoRT w/ HD Cis on 2024.    Patient presents post C2 HD Cisplatin, reports the following:    He wasn't feeling great over the weekend, having both nausea and intermitent vomiting, and also diarrhea (once daily since chemo through Wed-Sat).  He wasn't able to eat or drink much since last chemo, but since Monday, n/v has improved and he is slowly eating  and drinking more.  His lab from today showed Cre 1.7.    He had very mild hiccups which resolved spontaneously without taking baclofen.  Had tinnitus following C2 but improved with time. Denies subjective hearing loss.      ROS  Review of Systems   Constitutional:  Negative for chills and fever.   HENT:   Positive for mouth sores, sore throat, tinnitus and trouble swallowing. Negative for hearing loss, lump/mass and nosebleeds.    Respiratory:  Negative for cough and shortness of breath.    Cardiovascular:  Negative for chest pain and leg swelling.   Gastrointestinal:  Positive for constipation. Negative for abdominal pain, nausea and vomiting.   Musculoskeletal:  Negative for arthralgias.   Skin:  Negative for rash.   Neurological:  Negative for headaches, light-headedness and numbness.       Allergies  No Known Allergies     Medications  Current Outpatient Medications   Medication Instructions    allopurinol (ZYLOPRIM) 100 mg, oral, Daily    ascorbic acid (VITAMIN C) 1,000 mg, oral, Daily    baclofen (LIORESAL) 5 mg, oral, 3 times daily    colchicine 0.6 mg tablet Use 2 tbs at the sign of gout PO can repeat in 1 hr if still with pain X 1    dexAMETHasone (DECADRON) 8 mg, oral, Daily, For 3 days starting the day after treatment.    gabapentin (NEURONTIN) 800 mg, oral, 3 times daily    loperamide (IMODIUM) 2 mg, oral, 4 times daily PRN, Initial: 4 mg, followed by 2 mg every 2 to 4 hours or after each loose stoo    OLANZapine (ZYPREXA) 5 mg, oral, Nightly, For 4 days starting the evening of treatment.    ondansetron (ZOFRAN) 8 mg, oral, Every 8 hours PRN    oxyCODONE (ROXICODONE) 5 mg, oral, Every 6 hours PRN    prochlorperazine (COMPAZINE) 10 mg, oral, Every 6 hours PRN    triamcinolone (Kenalog) 0.1 % cream Topical, 2 times daily, Apply to affected area 1-2 times daily as needed.          Objective   VS: /74 (BP Location: Left arm, Patient Position: Sitting)   Pulse 80   Temp 36.4 °C (97.5 °F) (Temporal)    Resp 16   Wt 68.3 kg (150 lb 9.6 oz)   SpO2 100%   BMI 24.31 kg/m²   Weight: Daily Weight  05/15/24 : 68.3 kg (150 lb 9.6 oz)  05/13/24 : 70.2 kg (154 lb 12.2 oz)  05/10/24 : 72.1 kg (159 lb)  05/09/24 : 74.5 kg (164 lb 4.8 oz)  05/07/24 : 72.4 kg (159 lb 9.8 oz)  05/06/24 : 73.5 kg (162 lb)  05/03/24 : 72.3 kg (159 lb 6.3 oz)      Physical Exam  Constitutional:       Appearance: Normal appearance.   HENT:      Head: Normocephalic and atraumatic.      Mouth/Throat:      Lips: Pink.      Mouth: Mucous membranes are moist. Oral lesions present.        Comments: Left BOT mass no longer visible  Mucositis in left Posterior oropharynx   Eyes:      Extraocular Movements: Extraocular movements intact.      Pupils: Pupils are equal, round, and reactive to light.   Cardiovascular:      Rate and Rhythm: Normal rate and regular rhythm.      Pulses: Normal pulses.   Pulmonary:      Effort: Pulmonary effort is normal.      Breath sounds: Normal breath sounds.   Abdominal:      General: Bowel sounds are normal.   Musculoskeletal:         General: Normal range of motion.   Skin:     General: Skin is warm.   Neurological:      General: No focal deficit present.      Mental Status: He is alert and oriented to person, place, and time.   Psychiatric:         Mood and Affect: Mood normal.       Diagnostic Results   Labs  Below labs are reviewed today.  Results from last 7 days   Lab Units 05/15/24  0925   WBC AUTO x10*3/uL 3.8*   HEMOGLOBIN g/dL 10.4*   HEMATOCRIT % 30.6*   PLATELETS AUTO x10*3/uL 188   NEUTROS ABS x10*3/uL 3.23   LYMPHS ABS AUTO x10*3/uL 0.17*   MONOS ABS AUTO x10*3/uL 0.37   EOS ABS AUTO x10*3/uL 0.02   NEUTROS PCT AUTO % 84.2   LYMPHS PCT AUTO % 4.4   MONOS PCT AUTO % 9.6   EOS PCT AUTO % 0.5      Results from last 7 days   Lab Units 05/15/24  0925   GLUCOSE mg/dL 89   SODIUM mmol/L 137   POTASSIUM mmol/L 4.4   CHLORIDE mmol/L 98   CO2 mmol/L 27   BUN mg/dL 28*   CREATININE mg/dL 1.70*   EGFR mL/min/1.73m*2 52*  "  CALCIUM mg/dL 9.0   MAGNESIUM mg/dL 1.49*   ALBUMIN g/dL 4.3   PROTEIN TOTAL g/dL 7.5   BILIRUBIN TOTAL mg/dL 0.4   ALK PHOS U/L 51   ALT U/L 16   AST U/L 14                     Images  3/29/24 CT neck w/ contrast:   IMPRESSION:  Ill-defined soft tissue prominence within the left oropharynx centered in the anterior tonsillar pillar/left base of tongue, may measure approximately 1.8 cm, and likely corresponds to lesion visualized on clinical exam. No involvement of the parapharyngeal  spaces or osseous involvement.  Soft tissue filling the vallecula is thought to represent lingual tonsil.  Bilateral prominent cervical lymph nodes, largest in left level 2A measuring 1.2 cm. No enlarged lymph nodes by size criteria.     3/29/24 CT chest w/o contrast:   IMPRESSION:  1. There is a 3 mm left lower lobe nodule and a 2-3 mm left upper lobe nodule.  2. No evidence for lymphadenopathy. Probable hepatic cysts as described.  3. Incidental note is made of residual thymic tissue within the anterior mediastinum.     4/1/24 PET CT:  IMPRESSION:  1. Ill-defined large focal hypermetabolic soft tissue lesion centered in the anterior tonsillar pillar/left base of tongue bulging into the oropharynx, consistent with biopsy-proven squamous cell carcinoma.  2. Hypermetabolic focus at right tongue base, which could be physiologic, although neoplastic disease can not be excluded.  3. Multiple hypermetabolic bilateral multilevel cervical lymphadenopathy, largest of which located at left cervical level 2, as detailed above, compatible with locoregional metastatic omer  disease.  4. No evidence of hypermetabolic distant metastatic disease.    Pathology  No results found for: \"KBXHX\", \"PATHREP\", \"INTERP\", \"ADD1\", \"ADD2\", \"ADD3\", \"CORRECTIONHX\", \"ASTUDIES\", \"ANCILLARY1\", \"ANCILLARY2\", \"FINALINTERP\", \"COMDX\"      Assessment/Plan   Mr. Hanks is 38 y/o male with recent diagnosis of T2N2M0, P16+  left base of tongue squamous cell " carcinoma who started chemoRT w/ HD Cis on 4/16/2024.    # Stage II (T1N2M0) p16 positive left base of tongue squamous cell carcinoma   - PET/CT showed uptake in left base of tongue, measures approximately 1.8 x 1.4cm as well as multiple hypermetabolic bilateral multilevel cervical lymphadenopathy, largest of which located at left cervical level 2, ~1.2cm.  - GFR >90 (3/25/24 labs), no hearing deficits  - Discussed with patient about SOC (concurrent chemorad with high dose cisplatin) vs clinical trial (: High dose cisplatin vs weekly cis or [id number]: CRT followed by Nivolumab), patient might be interested in clinical trial, he will think about it over the weekend and let us know.  - 4/16/24: Patient started on chemoRT w/ HD Cisplatin. Feeling well, left tongue mass not subjectively increased in size. Mild dysphagia. Patient is deciding on 6 vs 7 weeks of RT and will get back to us. Patient is young (38yo) and has 3 children ages 3, 9 and 16 at home. Will refer him to Chaya Augustine NP for young adult services.    - 4/25/24: post chemo he experienced hiccups, likely from dex, has few episodes of diarrhea but resolved.  Otherwise, tolerated chemo well.  Cre 1.48 GFR 61 (baseline 73-83), improved from 4/19 labs, but will give 1L hydration today.  - 5/7/24: Patient tolerated C1 well but had some decrease in renal function, and hiccups that were not resolved with Baclofen. He had very good response so far as his left BOT mass is no longer visit. He does have mucositis in this area and has increasing odynophagia. Continue current pain regimen, continue Boost VHC x2/day.  -5/15/24: Post C2, experienced n/v, but improved with zofran, today he is feeling better, will give 1 hydration as his Cre worsened.  Mg is low at 1.49.    PLAN  -- start MgOx 400mg daily (script sent to pharmacy today)  -- [place] NS today, continue IVF on every Friday and additional IVF next Monday for hydration support.   -- Continue Gabapentin 800mg TID,  Oxycodone 5-10mg Q6hr PRN.   -- Continue ample oral hydration with pedialyte powder.   -- Can omit Dexamethasone this week as pt had intractable hiccups and did not have any nausea  -- Referral for young adult oncology services  (BRETT Augustine, JOHN)

## 2024-05-16 ENCOUNTER — HOSPITAL ENCOUNTER (OUTPATIENT)
Dept: RADIATION ONCOLOGY | Facility: HOSPITAL | Age: 39
Setting detail: RADIATION/ONCOLOGY SERIES
Discharge: HOME | End: 2024-05-16
Payer: COMMERCIAL

## 2024-05-16 ENCOUNTER — APPOINTMENT (OUTPATIENT)
Dept: RADIATION ONCOLOGY | Facility: HOSPITAL | Age: 39
End: 2024-05-16
Payer: COMMERCIAL

## 2024-05-16 VITALS
OXYGEN SATURATION: 98 % | WEIGHT: 155.3 LBS | DIASTOLIC BLOOD PRESSURE: 80 MMHG | SYSTOLIC BLOOD PRESSURE: 117 MMHG | TEMPERATURE: 97.3 F | HEART RATE: 86 BPM | RESPIRATION RATE: 18 BRPM | BODY MASS INDEX: 25.07 KG/M2

## 2024-05-16 DIAGNOSIS — Z51.0 ENCOUNTER FOR ANTINEOPLASTIC RADIATION THERAPY: ICD-10-CM

## 2024-05-16 DIAGNOSIS — C01 MALIGNANT NEOPLASM OF BASE OF TONGUE (MULTI): ICD-10-CM

## 2024-05-16 LAB
RAD ONC MSQ ACTUAL FRACTIONS DELIVERED: 27
RAD ONC MSQ ACTUAL FRACTIONS DELIVERED: 28
RAD ONC MSQ ACTUAL SESSION DELIVERED DOSE: 200 CGRAY
RAD ONC MSQ ACTUAL SESSION DELIVERED DOSE: 200 CGRAY
RAD ONC MSQ ACTUAL TOTAL DOSE: 5400 CGRAY
RAD ONC MSQ ACTUAL TOTAL DOSE: 5600 CGRAY
RAD ONC MSQ ELAPSED DAYS: 30
RAD ONC MSQ ELAPSED DAYS: 30
RAD ONC MSQ LAST DATE: NORMAL
RAD ONC MSQ LAST DATE: NORMAL
RAD ONC MSQ PRESCRIBED FRACTIONAL DOSE: 200 CGRAY
RAD ONC MSQ PRESCRIBED FRACTIONAL DOSE: 200 CGRAY
RAD ONC MSQ PRESCRIBED NUMBER OF FRACTIONS: 35
RAD ONC MSQ PRESCRIBED NUMBER OF FRACTIONS: 35
RAD ONC MSQ PRESCRIBED TECHNIQUE: NORMAL
RAD ONC MSQ PRESCRIBED TECHNIQUE: NORMAL
RAD ONC MSQ PRESCRIBED TOTAL DOSE: 7000 CGRAY
RAD ONC MSQ PRESCRIBED TOTAL DOSE: 7000 CGRAY
RAD ONC MSQ PRESCRIPTION PATTERN COMMENT: NORMAL
RAD ONC MSQ PRESCRIPTION PATTERN COMMENT: NORMAL
RAD ONC MSQ START DATE: NORMAL
RAD ONC MSQ START DATE: NORMAL
RAD ONC MSQ TREATMENT COURSE NUMBER: 1
RAD ONC MSQ TREATMENT COURSE NUMBER: 1
RAD ONC MSQ TREATMENT SITE: NORMAL
RAD ONC MSQ TREATMENT SITE: NORMAL

## 2024-05-16 PROCEDURE — 77386 HC INTENSITY-MODULATED RADIATION THERAPY (IMRT), COMPLEX: CPT | Performed by: RADIOLOGY

## 2024-05-16 PROCEDURE — 77386 HC INTENSITY-MODULATED RADIATION THERAPY (IMRT), COMPLEX: CPT | Mod: XE | Performed by: RADIOLOGY

## 2024-05-16 PROCEDURE — 77014 CHG CT GUIDANCE RADIATION THERAPY FLDS PLACEMENT: CPT | Performed by: RADIOLOGY

## 2024-05-17 ENCOUNTER — OFFICE VISIT (OUTPATIENT)
Dept: HEMATOLOGY/ONCOLOGY | Facility: HOSPITAL | Age: 39
End: 2024-05-17
Payer: COMMERCIAL

## 2024-05-17 ENCOUNTER — APPOINTMENT (OUTPATIENT)
Dept: RADIATION ONCOLOGY | Facility: HOSPITAL | Age: 39
End: 2024-05-17
Payer: COMMERCIAL

## 2024-05-17 ENCOUNTER — INFUSION (OUTPATIENT)
Dept: HEMATOLOGY/ONCOLOGY | Facility: HOSPITAL | Age: 39
End: 2024-05-17
Payer: COMMERCIAL

## 2024-05-17 ENCOUNTER — HOSPITAL ENCOUNTER (OUTPATIENT)
Dept: RADIATION ONCOLOGY | Facility: HOSPITAL | Age: 39
Setting detail: RADIATION/ONCOLOGY SERIES
Discharge: HOME | End: 2024-05-17
Payer: COMMERCIAL

## 2024-05-17 VITALS
DIASTOLIC BLOOD PRESSURE: 71 MMHG | BODY MASS INDEX: 24.27 KG/M2 | SYSTOLIC BLOOD PRESSURE: 115 MMHG | OXYGEN SATURATION: 97 % | HEART RATE: 84 BPM | WEIGHT: 150.35 LBS | TEMPERATURE: 97.5 F | RESPIRATION RATE: 16 BRPM

## 2024-05-17 DIAGNOSIS — Z71.82 EXERCISE COUNSELING: ICD-10-CM

## 2024-05-17 DIAGNOSIS — C10.9 MALIGNANT NEOPLASM OF OROPHARYNX (MULTI): ICD-10-CM

## 2024-05-17 DIAGNOSIS — Z71.3 DIETARY COUNSELING: ICD-10-CM

## 2024-05-17 DIAGNOSIS — Z51.5 ENCOUNTER FOR PALLIATIVE CARE: ICD-10-CM

## 2024-05-17 DIAGNOSIS — Z51.0 ENCOUNTER FOR ANTINEOPLASTIC RADIATION THERAPY: ICD-10-CM

## 2024-05-17 DIAGNOSIS — C10.9 MALIGNANT NEOPLASM OF OROPHARYNX (MULTI): Primary | ICD-10-CM

## 2024-05-17 DIAGNOSIS — C01 MALIGNANT NEOPLASM OF BASE OF TONGUE (MULTI): ICD-10-CM

## 2024-05-17 LAB
RAD ONC MSQ ACTUAL FRACTIONS DELIVERED: 29
RAD ONC MSQ ACTUAL SESSION DELIVERED DOSE: 200 CGRAY
RAD ONC MSQ ACTUAL TOTAL DOSE: 5800 CGRAY
RAD ONC MSQ ELAPSED DAYS: 31
RAD ONC MSQ LAST DATE: NORMAL
RAD ONC MSQ PRESCRIBED FRACTIONAL DOSE: 200 CGRAY
RAD ONC MSQ PRESCRIBED NUMBER OF FRACTIONS: 35
RAD ONC MSQ PRESCRIBED TECHNIQUE: NORMAL
RAD ONC MSQ PRESCRIBED TOTAL DOSE: 7000 CGRAY
RAD ONC MSQ PRESCRIPTION PATTERN COMMENT: NORMAL
RAD ONC MSQ START DATE: NORMAL
RAD ONC MSQ TREATMENT COURSE NUMBER: 1
RAD ONC MSQ TREATMENT SITE: NORMAL

## 2024-05-17 PROCEDURE — 2500000004 HC RX 250 GENERAL PHARMACY W/ HCPCS (ALT 636 FOR OP/ED): Performed by: INTERNAL MEDICINE

## 2024-05-17 PROCEDURE — 99215 OFFICE O/P EST HI 40 MIN: CPT | Performed by: NURSE PRACTITIONER

## 2024-05-17 PROCEDURE — 96360 HYDRATION IV INFUSION INIT: CPT | Mod: INF

## 2024-05-17 PROCEDURE — 77386 HC INTENSITY-MODULATED RADIATION THERAPY (IMRT), COMPLEX: CPT | Performed by: RADIOLOGY

## 2024-05-17 PROCEDURE — 1036F TOBACCO NON-USER: CPT | Performed by: NURSE PRACTITIONER

## 2024-05-17 PROCEDURE — 77014 CHG CT GUIDANCE RADIATION THERAPY FLDS PLACEMENT: CPT | Performed by: RADIOLOGY

## 2024-05-17 RX ADMIN — SODIUM CHLORIDE 1000 ML: 9 INJECTION, SOLUTION INTRAVENOUS at 10:43

## 2024-05-17 ASSESSMENT — PAIN SCALES - GENERAL: PAINLEVEL: 0-NO PAIN

## 2024-05-17 NOTE — PROGRESS NOTES
Patient ID: Pilo Hanks is a 39 y.o. male.  Referring Physician: Dr. Burkitt/Shaila Fitzgerald PA-C  Primary Care Provider: Ruth Villa DO  ONCOLOGIC HISTORY  - 02/2024: Presented to PCP with mass in left tonsillar area. Referred to ENT  - 3/15/24: In office biopsy of left BOT w/ Dr Cruz.  Path showed p16 positive SCC  - 3/13/29/24: CT neck and chest showed a soft tissue mass within the left oropharynx centered in the anterior tonsillar pillar/left base of tongue, measures approximately 1.8 x 1.4cm.  - 4/1/24: PET/CT showed multiple hypermetabolic bilateral multilevel cervical lymphadenopathy, largest of which located at left cervical level 2, ~1.2cm.  - 4/5/24 [facility]: Rec surgical resection vs chemoRT vs clinical trials , [id number]         Subjective    Patient presents today for young adult oncology consultation. He has malignant neoplasm of the oropharynx and will complete chemo/RT - cisplatin (cumulative dose 567mg) and radiation 7000cGy in 35 fractions. He will complete his therapy 06/07/24.     Overall he relays tolerating therapy very well with minimal side effects. Primarily he notes fatigue, taste changes, and decreased appetite. He is managing these without issue.     Emotionally, he relays he is doing well, has adjusted to his life dealing with cancer and notes no concerns with coping, mood, or worries. He does note that wife has experienced more stress related to his diagnosis than he did.    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 He has worked in a flexible hybrid role throughout his therapy. He notes no concerns with finances, has secure housing and transportation, has access to healthy foods.     He and wife have been together for 11 years, they have three children aged 16, 9, 4 years. His oldest son is aware of his diagnosis and has been with him at appointments, he/wife have not shared much with their middle and youngest child. Has met  with child-life provider per our referral. Family building is complete, he is s/p vasectomy for contraception. Notes no concerns with sexual function.     Not currently doing any purposeful exercise, but knows he should engage in some type of activity. Eats a mostly well-balanced diet. Both he/wife do grocery shopping, he notes need to do better with his diet. Does drink wine, not currently but does plan to resume in future once treatment is complete. Former smoker 6-8 cigarettes/day x 15 years -- quit in 2023. Does use marijuana edibles most days. Does not vape, no other recreational drug use.       PMH - acute tonsillitis recurrent, DAMON, multiple sprained joints  PSxH: wisdom teeth extraction, vasectomy 2023    Social History: Grew up in [place], has one sister. Now lives in [place] with his wife and three children José Miguel (16), Domingo (9), and Cathy (4). 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 Prior tobacco 0.3packs/day x 15 years (3.8 ttl pack years) cigarettes [id number], does drink ETOH, wine 1-3 drinks/week, daily marijuana edibles. Does not vape or use other recreational drugs.    Review of Systems   Constitutional:  Positive for appetite change and fatigue. Negative for unexpected weight change.   Psychiatric/Behavioral:  Negative for decreased concentration, depression and sleep disturbance. The patient is not nervous/anxious.         Objective   BSA: There is no height or weight on file to calculate BSA.  There were no vitals taken for this visit. VS ht/wt reviewed in the infusion encounter    Family History   Problem Relation Name Age of Onset    No Known Problems Mother      No Known Problems Father      Diabetes Paternal Grandfather      Heart failure Paternal Grandfather       Oncology History   Malignant neoplasm of oropharynx (Multi)   4/4/2024 Initial Diagnosis    Malignant neoplasm of oropharynx (CMS/HCC)     4/16/2024 -  Chemotherapy    CISplatin with Concurrent  Radiation, 21 Day Cycles - Head and Neck       Pilo Hanks  reports that he quit smoking about 16 months ago. His smoking use included cigarettes. He started smoking about 16 years ago. He has a 3.8 pack-year smoking history. He has never used smokeless tobacco.  He  reports that he does not currently use alcohol.  He  reports current drug use. Drug: Marijuana.    Physical Exam  Constitutional:       General: He is not in acute distress.     Appearance: Normal appearance. He is normal weight. He is not ill-appearing.   Neurological:      General: No focal deficit present.      Mental Status: He is alert and oriented to person, place, and time.      Cranial Nerves: No cranial nerve deficit.      Motor: No weakness.   Psychiatric:         Mood and Affect: Mood normal.         Behavior: Behavior normal.         Thought Content: Thought content normal.         Judgment: Judgment normal.       Performance Status:  Asymptomatic    Assessment/Plan    Pilo Hanks is a 39 y.o. 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     #Psychosocial: young adult and parent with cancer, adjusting well to illness and no acute needs  - Discussed resources available should he desire them in the future including psychiatry, psychology, spiritual care, expressive therapies, and peer support  - Patient is enrolled in [employer] and will receive young adult social programming event fliers  - Connected with child life services, provided patient with ACS resource for talking to children about cancer  - Provided him with resources to elephants and tea and the gathering place    #Diet/Exercise/Healthy Lifestyle:  - Discussed research demonstrating consumption of a healthy, plant based diet not only decreases cancer risk, but also has been found to improve survival in cancer patients who partake in a healthy diet.   - We  reviewed the American Cancer Society guidelines for a healthy diet including mostly plant based foods  with incorporation of plant/lean animal proteins and healthy fats such as the mediterranean diet.   - Patient provided with a copy of ACS guidelines for healthy diet  - Discussed research that demonstrates decreased cancer risk and improved survival in cancer patients who exercise and stay active throughout diagnosis and treatment.  - Encouraged patient to begin gentle exercise as tolerated with low impact activities such as walking, bicycling, or lifting light weights  - Encouraged patient to continue to abstain from tobacco, drink alcohol in moderation <2 drinks/week and avoid recreational drugs    #Genetic Risk: young adult with cancer and personal family history of cancer  - Consider genetic risk evaluation given young age; does have hx of exposures possible HPV, tobacco, and ETOH  - Discussed small percentage ~5% of all cancers attributed to inheritable mutation that predisposes an individual for increased cancer risk, genetic risk consultation will perform family pedigree and blood or tissue typing to analyze patient DNA for any identifiable mutations  - Should he have an identified mutation this would not change his current treatment plan, but may have implications for earlier or more frequent screenings for other types of cancer, additionally siblings and offspring may also be offered genetic screening    #Risk for infertility:  - Discussed damaging effects cancer treatments can have on sperm production, quantity, and quality and the potential impact of infertility  - Discussed potential for cancer treatments to effect male hormones, ejaculation and/or cause erectile dysfunction  - Current risk to fertility is INTERMEDIATE based on treatment of Cisplatin at doses >500mg  - Has completed family building, s/p vasectomy    #Late effects/survivorship care: cumulative doses of cisplatin 567mg/m2, and 7000cGy  radiation to oropharynx  - Will address in follow up, would screen according to COG guidelines    Follow up in 4-6 months               DOMINGA Russell-CNP

## 2024-05-20 ENCOUNTER — RADIATION ONCOLOGY OTV (OUTPATIENT)
Dept: RADIATION ONCOLOGY | Facility: HOSPITAL | Age: 39
End: 2024-05-20
Payer: COMMERCIAL

## 2024-05-20 ENCOUNTER — APPOINTMENT (OUTPATIENT)
Dept: RADIATION ONCOLOGY | Facility: HOSPITAL | Age: 39
End: 2024-05-20
Payer: COMMERCIAL

## 2024-05-20 ENCOUNTER — HOSPITAL ENCOUNTER (OUTPATIENT)
Dept: RADIATION ONCOLOGY | Facility: HOSPITAL | Age: 39
Setting detail: RADIATION/ONCOLOGY SERIES
Discharge: HOME | End: 2024-05-20
Payer: COMMERCIAL

## 2024-05-20 ENCOUNTER — TREATMENT (OUTPATIENT)
Dept: SPEECH THERAPY | Facility: HOSPITAL | Age: 39
End: 2024-05-20
Payer: COMMERCIAL

## 2024-05-20 ENCOUNTER — TELEPHONE (OUTPATIENT)
Dept: RADIATION ONCOLOGY | Facility: HOSPITAL | Age: 39
End: 2024-05-20

## 2024-05-20 VITALS
WEIGHT: 151.7 LBS | DIASTOLIC BLOOD PRESSURE: 78 MMHG | SYSTOLIC BLOOD PRESSURE: 115 MMHG | HEART RATE: 88 BPM | BODY MASS INDEX: 24.38 KG/M2 | OXYGEN SATURATION: 98 % | RESPIRATION RATE: 18 BRPM | HEIGHT: 66 IN | TEMPERATURE: 96.8 F

## 2024-05-20 DIAGNOSIS — C01 MALIGNANT NEOPLASM OF BASE OF TONGUE (MULTI): ICD-10-CM

## 2024-05-20 DIAGNOSIS — Z51.0 ENCOUNTER FOR ANTINEOPLASTIC RADIATION THERAPY: ICD-10-CM

## 2024-05-20 DIAGNOSIS — R13.12 OROPHARYNGEAL DYSPHAGIA: Primary | ICD-10-CM

## 2024-05-20 LAB
RAD ONC MSQ ACTUAL FRACTIONS DELIVERED: 30
RAD ONC MSQ ACTUAL SESSION DELIVERED DOSE: 200 CGRAY
RAD ONC MSQ ACTUAL TOTAL DOSE: 6000 CGRAY
RAD ONC MSQ ELAPSED DAYS: 34
RAD ONC MSQ LAST DATE: NORMAL
RAD ONC MSQ PRESCRIBED FRACTIONAL DOSE: 200 CGRAY
RAD ONC MSQ PRESCRIBED NUMBER OF FRACTIONS: 35
RAD ONC MSQ PRESCRIBED TECHNIQUE: NORMAL
RAD ONC MSQ PRESCRIBED TOTAL DOSE: 7000 CGRAY
RAD ONC MSQ PRESCRIPTION PATTERN COMMENT: NORMAL
RAD ONC MSQ START DATE: NORMAL
RAD ONC MSQ TREATMENT COURSE NUMBER: 1
RAD ONC MSQ TREATMENT SITE: NORMAL

## 2024-05-20 PROCEDURE — 77386 HC INTENSITY-MODULATED RADIATION THERAPY (IMRT), COMPLEX: CPT | Performed by: RADIOLOGY

## 2024-05-20 PROCEDURE — 77427 RADIATION TX MANAGEMENT X5: CPT | Performed by: RADIOLOGY

## 2024-05-20 PROCEDURE — 92612 ENDOSCOPY SWALLOW (FEES) VID: CPT | Mod: GN

## 2024-05-20 PROCEDURE — 77014 CHG CT GUIDANCE RADIATION THERAPY FLDS PLACEMENT: CPT | Performed by: RADIOLOGY

## 2024-05-20 ASSESSMENT — ENCOUNTER SYMPTOMS
SLEEP DISTURBANCE: 0
DEPRESSION: 0
DECREASED CONCENTRATION: 0
APPETITE CHANGE: 1
FATIGUE: 1
NERVOUS/ANXIOUS: 0
UNEXPECTED WEIGHT CHANGE: 0

## 2024-05-20 NOTE — PROGRESS NOTES
"Radiation Oncology On Treatment Visit    Patient Name:  Pilo Hanks  MRN:  [id number]  :  1985    Referring Provider: No ref. provider found  Primary Care Provider: Ruth Villa DO  Care Team: Patient Care Team:  Ruth Villa DO as PCP - General  Ruth Villa DO as PCP - [facility] A[facility] PCP  Raquel Blackman MD as Radiation Oncologist (Radiation Oncology)  Kyunghee Burkitt, DO as Consulting Physician (Hematology and Oncology)  Rosina Fitzgerald PA-C as Physician Assistant (Hematology and Oncology)    Date of Service: 2024     Diagnosis:   Specialty Problems          Radiation Oncology Problems    Malignant neoplasm of oropharynx (Multi)         Treatment Summary:  IMRT: Bilateral Head and neck    Treatment Period Technique Fraction Dose Fractions Total Dose   Course 1 2024-2024  (days elapsed: 31)         BOT + nodes 2024-2024 VMAT 200 / 200 cGy 29 / 35 5800 / 7,000 cGy     SUBJECTIVE: Patient last had IV fluids on Wednesday of last week.  Labs from that day, 5/15/24, revealed BUN of 28 and creatinine of 1.7.  The patient reports that he has not had nausea.  His pain is well-controlled on gabapentin 800 mg 3 times daily as well as oxycodone 5 mg 3 times a day for breakthrough pain he is most bothered by dry mouth and choking sensation when eating solid foods he tried to drink very high-calorie shakes and does not like them because of the smell.  He has been trying to eat 3 times per day.  He is due for additional IV fluids on Friday this week     OBJECTIVE:   Vital Signs:  /78   Pulse 88   Temp 36 °C (96.8 °F) (Temporal)   Resp 18   Ht 1.676 m (5' 6\")   Wt 68.8 kg (151 lb 11.2 oz)   SpO2 98%   BMI 24.49 kg/m²    Pain Scale: The patient's current pain level was assessed.  They report currently having a pain of 2 out of 10.    Other Pertinent Findings:   L BOT tumor has shrunk tremendously on transoral exam. mucositis in posterior oropharynx, no thrush or desquamation. " Patient is a 37y old  Male who presents with a chief complaint of abdominal pain, palpitations (10 Feb 2024 11:49)    HPI:  HPI: 36 yo male with Hx. of Crohn's,  perianal fistula  s/p diversion ileostomy done at [facility], , recently  hospitalized 1/17-1/31/24 for Crohn's exacerbation and treated with steroid taper, discharged on Metformin for diabetes ,  presented in the ED with abdominal pain 10/10,  watery output from rectum and Ileostomy. The patient states his symptoms started 2 days ago with pain in the epigastric area and got gradually worse, he took Tylenol at home with no improvement, also developed rapid hear beats. The patient is also c/o painful swelling in R axilla with drainage, redness.     PMHx: Crohn's since age 11,  s/p ileostomy done at [facility], seton placement  at [facility]  , ETOH abuse , recently diagnosed DM2,     PSHx: diversion  Ileostomy done at [facility], seton placement at [facility] , perianal fistula repair  2002      Meds: per reconciliation sheet, noted below  MEDICATIONS  (STANDING):  dextrose 5%. 1000 milliLiter(s) (100 mL/Hr) IV Continuous <Continuous>  dextrose 5%. 1000 milliLiter(s) (50 mL/Hr) IV Continuous <Continuous>  dextrose 50% Injectable 25 Gram(s) IV Push once  dextrose 50% Injectable 25 Gram(s) IV Push once  dextrose 50% Injectable 12.5 Gram(s) IV Push once  doxycycline monohydrate Capsule 100 milliGRAM(s) Oral every 12 hours  glucagon  Injectable 1 milliGRAM(s) IntraMuscular once  influenza   Vaccine 0.5 milliLiter(s) IntraMuscular once  insulin lispro (ADMELOG) corrective regimen sliding scale   SubCutaneous three times a day before meals  mupirocin 2% Ointment 1 Application(s) Topical two times a day  pantoprazole    Tablet 40 milliGRAM(s) Oral before breakfast    Allergies    ciprofloxacin (Other (Mild to Mod))    Intolerances      Social: no smoking, no alcohol, no illegal drugs; no recent travel, no exposure to TB  FAMILY HISTORY:  Diabetes mellitus (Father)       no history of premature cardiovascular disease in first degree relatives    ROS: the patient denies fever, no chills, no HA, no dizziness, no sore throat, no blurry vision, no CP, no palpitations, no SOB, no cough, + abdominal pain, + diarrhea, no N/V, no dysuria, no leg pain, no claudication, no rash, no joint aches, no rectal pain or bleeding, no night sweats    All other systems reviewed and are negative    Vital Signs Last 24 Hrs  T(C): 36.6 (10 Feb 2024 08:46), Max: 36.6 (09 Feb 2024 19:47)  T(F): 97.8 (10 Feb 2024 08:46), Max: 97.9 (09 Feb 2024 19:47)  HR: 88 (10 Feb 2024 08:46) (85 - 129)  BP: 111/74 (10 Feb 2024 08:46) (108/75 - 138/80)  BP(mean): 98 (10 Feb 2024 02:06) (98 - 98)  RR: 18 (10 Feb 2024 08:46) (17 - 18)  SpO2: 99% (10 Feb 2024 08:46) (95% - 100%)    Parameters below as of 10 Feb 2024 08:46  Patient On (Oxygen Delivery Method): room air      Daily Height in cm: 175.26 (09 Feb 2024 19:50)    Daily     PE:  Constitutional: NAD  HEENT: NC/AT, EOMI, PERRLA, conjunctivae clear; ears and nose atraumatic; pharynx benign  Neck: supple; thyroid not palpable  Back: no tenderness  Respiratory: respiratory effort normal; clear to auscultation  Cardiovascular: S1S2 regular, no murmurs  Abdomen: soft, not tender, not distended, positive BS; liver and spleen WNL  Genitourinary: no suprapubic tenderness  Lymphatic: no LN palpable  Musculoskeletal: no muscle tenderness, no joint swelling or tenderness  Extremities: no pedal edema  Neurological/ Psychiatric: AxOx3, Judgement and insight normal;  moving all extremities  Skin: no rashes; no palpable lesions; R axilla drainage erythema hidradenitis suppurtiva    Labs: all available labs reviewed                        14.7   12.08 )-----------( 236      ( 09 Feb 2024 21:22 )             42.6     02-09    134<L>  |  102  |  15  ----------------------------<  207<H>  3.6   |  32<H>  |  0.87    Ca    9.2      09 Feb 2024 21:22    TPro  7.4  /  Alb  3.3  /  TBili  0.2  /  DBili  x   /  AST  11<L>  /  ALT  24  /  AlkPhos  91  02-09     LIVER FUNCTIONS - ( 09 Feb 2024 21:22 )  Alb: 3.3 g/dL / Pro: 7.4 gm/dL / ALK PHOS: 91 U/L / ALT: 24 U/L / AST: 11 U/L / GGT: x           Urinalysis Basic - ( 09 Feb 2024 21:53 )    Color: Yellow / Appearance: Clear / SG: >1.030 / pH: x  Gluc: x / Ketone: Trace mg/dL  / Bili: Negative / Urobili: 0.2 mg/dL   Blood: x / Protein: Negative mg/dL / Nitrite: Negative   Leuk Esterase: Negative / RBC: x / WBC x   Sq Epi: x / Non Sq Epi: x / Bacteria: x          Radiology: all available radiological tests reviewed    Advanced directives addressed: full resuscitation   Weight is down 3 more lbs.  Toxicity Assessment          4/16/2024    15:06 4/25/2024    12:30 4/29/2024    10:06 5/6/2024    10:11 5/13/2024    10:23 5/20/2024    09:09   Toxicity Assessment   Adverse Events Reviewed (WDL) No (Exceptions to WDL) No (Exceptions to WDL) No (Exceptions to WDL) No (Exceptions to WDL) No (Exceptions to WDL) No (Exceptions to WDL)   Treatment  and neck Head and neck Head and neck Head and neck Head and neck Head and neck   Anorexia Grade 0       First weight during .6lbs Grade 0 Grade 0       loss of .2lbs since last week, PO intake still normal Grade 1       increase in painful swallowing, loss of 1.5lbs since last week. BMX? Grade 2       loss of 7.25lbs since last week, Dietician following Grade 2       loss of 3lbs since last week, dietician following. loss of 10% since start reached   Dehydration Grade 0       oral intake of fluids WNL Grade 1       Pt received IV hydration today Grade 0       BP WNL Grade 1       Ortho positive. fluids encouraged. Grade 1       getting IV hydration, PO intake trying to keep up Grade 0       intake of fluids normal, BP normal   Dermatitis Radiation Grade 0       creams provided Grade 0       creams at home Grade 0       creams at home Grade 1       Some slight redness/darkening. Creams provided and at home Grade 1       skin darkening, Creams at home Grade 1       dry skin, creams at home   Diarrhea   Grade 0 Grade 0       Constipation at times, Mirilax at home  Grade 0   Fatigue Grade 0 Grade 0 Grade 1       Tired over the weekend, Rest and activity balance Grade 1       Not very tired but energy level is lower. Rest and activity balance Grade 1       Slept more than normal over the weekend. Rest and activity balance Grade 2       Tired nearly every day, rest and activity balance   Nausea Grade 0       Meds at home Grade 0 Grade 0 Grade 0 Grade 1       over the past weekend, Zofran helping Grade 0   Pain Grade 0       Gabapentin at  home, and started today Grade 1       increase in Gabapentin dose Grade 1       Back of mouth/throat L side, Gabapentin at home Grade 1       Throat/Mouth Grade 1       Mouth/throat discomfort Grade 1       throat/neck   Vomiting Grade 0 Grade 0 Grade 0 Grade 0 Grade 1       episodes over the weekend, Meds at home Grade 0       thick saliva gagged and produced emesis   Dysphagia Grade 0 Grade 1       dry mouth Grade 1       increase in painfull swallowing Grade 1       increase in pain/possible aspiration. SLP following Grade 1       thick mucous, worried about choking Grade 1       painful at times, visit with SLP today   Mucositis Oral Grade 0       Glutamine at home, already started 1 week prior to RT Grade 0       Glutamine at home Grade 0       Glutamine at home Grade 1       Sore present with general redness, Glutamine at home Grade 1       redness and sores present, Glutamine at home Grade 1       redness, with one sore visible, Glutamine at home   Hearing Impaired   Grade 0      Blurred Vision Grade 0        Dry Mouth Grade 0 Grade 1       throughout the day, pt sipping water helps. Grade 1       Sipping water, worse in the morning Grade 1       Worse in the AM, Gultamine and Humidifier encouraged Grade 1       Worse is the AM, Glutamine at home Grade 2       thick mucous, worse in the AM. Glutamine and Salt/soda at home   Ear Pain Grade 0        Tinnitus Grade 0        Oral Pain Grade 0 Grade 0 Grade 0 Grade 1       increase in burning while eating Grade 1       some sores, Gabapentin at home Grade 0   Aspiration Grade 0 Grade 0 Grade 0 Grade 0 Grade 0 Grade 0   Hoarseness Grade 0 Grade 0 Grade 0 Grade 0 Grade 0 Grade 0   Voice Alteration Grade 0 Grade 0 Grade 0 Grade 0 Grade 0 Grade 0        Assessment / Plan:  The patient is tolerating radiation therapy as anticipated.  Continue per current treatment plan.      Reviewed with patient their Symptom Management Form for Head and Neck Cancer:     CRT regimen:  HPI:  HPI: 36 yo male with hx of Crohn's disease with perianal fistulas  s/p seton placement and diversion ileostomy done at [facility], , recently  hospitalized 1/17-1/31/24 for Crohn's exacerbation and treated with steroid taper  presented in the ED with right axilla pain and abdominal pain. Patient states abdominal pain has been persistent since prior discharge but right axilla pain is what led to ED. Denies nausea, vomiting. Continues to have output from rectum and ileostomy. He has been off biologics and is scheduled for follow up with his Crohn's disease specialist at [facility] on 2/12/24.    Currently hemodynamically stable and afebrile. WBC 9, Hgb 12.8     PAST MEDICAL & SURGICAL HISTORY:  Crohn's disease of large intestine without complication  (No current flare up)      Pancreatitis      S/P ORIF (open reduction internal fixation) fracture  (Right ankle, 2014)      History of colonoscopy  (2014)      Perianal fistula  repair in 2002          Home Medications:  acetaminophen 325 mg oral tablet: 2 tab(s) orally every 6 hours As needed Temp greater or equal to 38C (100.4F), Mild Pain (1 - 3) (10 Feb 2024 10:11)      MEDICATIONS  (STANDING):  dextrose 5%. 1000 milliLiter(s) (100 mL/Hr) IV Continuous <Continuous>  dextrose 5%. 1000 milliLiter(s) (50 mL/Hr) IV Continuous <Continuous>  dextrose 50% Injectable 25 Gram(s) IV Push once  dextrose 50% Injectable 25 Gram(s) IV Push once  dextrose 50% Injectable 12.5 Gram(s) IV Push once  doxycycline monohydrate Capsule 100 milliGRAM(s) Oral every 12 hours  glucagon  Injectable 1 milliGRAM(s) IntraMuscular once  influenza   Vaccine 0.5 milliLiter(s) IntraMuscular once  insulin lispro (ADMELOG) corrective regimen sliding scale   SubCutaneous three times a day before meals  mupirocin 2% Ointment 1 Application(s) Topical two times a day  pantoprazole    Tablet 40 milliGRAM(s) Oral before breakfast    MEDICATIONS  (PRN):  acetaminophen     Tablet .. 650 milliGRAM(s) Oral every 6 hours PRN Temp greater or equal to 38C (100.4F), Mild Pain (1 - 3)  aluminum hydroxide/magnesium hydroxide/simethicone Suspension 30 milliLiter(s) Oral every 4 hours PRN Dyspepsia  dextrose Oral Gel 15 Gram(s) Oral once PRN Blood Glucose LESS THAN 70 milliGRAM(s)/deciliter  HYDROmorphone   Tablet 4 milliGRAM(s) Oral every 4 hours PRN Moderate Pain (4 - 6)  HYDROmorphone  Injectable 1.5 milliGRAM(s) IV Push every 4 hours PRN Severe Pain (7 - 10)  melatonin 3 milliGRAM(s) Oral at bedtime PRN Insomnia  ondansetron Injectable 4 milliGRAM(s) IV Push every 8 hours PRN Nausea and/or Vomiting      Allergies    ciprofloxacin (Other (Mild to Mod))    Intolerances        SOCIAL HISTORY:    FAMILY HISTORY:  Diabetes mellitus (Father)        ROS  As above  Otherwise unremarkable    Vital Signs Last 24 Hrs  T(C): 36.8 (11 Feb 2024 08:38), Max: 36.8 (11 Feb 2024 08:38)  T(F): 98.2 (11 Feb 2024 08:38), Max: 98.2 (11 Feb 2024 08:38)  HR: 89 (11 Feb 2024 08:38) (87 - 91)  BP: 120/76 (11 Feb 2024 08:38) (107/75 - 120/82)  BP(mean): 73 (10 Feb 2024 15:40) (73 - 73)  RR: 18 (11 Feb 2024 08:38) (18 - 18)  SpO2: 100% (11 Feb 2024 08:38) (96% - 100%)    Parameters below as of 11 Feb 2024 08:38  Patient On (Oxygen Delivery Method): room air        Constitutional: NAD  Respiratory: CTAB  Cardiovascular: S1 and S2, RRR  Gastrointestinal: BS+, soft, ileostomy intact  Extremities: No peripheral edema  Psychiatric: Normal mood, normal affect  Skin: right axilla swelling    LABS:                        12.8   9.07  )-----------( 225      ( 11 Feb 2024 06:06 )             38.1     02-11    136  |  103  |  6<L>  ----------------------------<  128<H>  3.9   |  31  |  0.50    Ca    9.0      11 Feb 2024 06:06    TPro  7.4  /  Alb  3.3  /  TBili  0.2  /  DBili  x   /  AST  11<L>  /  ALT  24  /  AlkPhos  91  02-09    PT/INR - ( 09 Feb 2024 21:22 )   PT: 8.2 sec;   INR: 0.72 ratio         PTT - ( 09 Feb 2024 21:22 )  PTT:24.7 sec  LIVER FUNCTIONS - ( 09 Feb 2024 21:22 )  Alb: 3.3 g/dL / Pro: 7.4 gm/dL / ALK PHOS: 91 U/L / ALT: 24 U/L / AST: 11 U/L / GGT: x             RADIOLOGY & ADDITIONAL STUDIES:    ACC: [id number] EXAM:  CT ABDOMEN AND PELVIS OC IC   ORDERED BY: ED HUGO     PROCEDURE DATE:  02/09/2024          INTERPRETATION:  CLINICAL INFORMATION: Left upper quadrant pain.    COMPARISON: 1/28/2024.    CONTRAST/COMPLICATIONS:  IV Contrast: Omnipaque 350  90 cc administered   0 cc discarded  Oral Contrast: Omnipaque 300  Complications: None reported at time of study completion    PROCEDURE:  CT of the Abdomen and Pelvis was performed.  Sagittal and coronal reformats were performed.    FINDINGS:  LOWER CHEST: Posterior dependent atelectasis is appreciated. There is   stable right lower lobe subpleural cyst.    LIVER: Within normal limits.  BILE DUCTS: Normal caliber.  GALLBLADDER: Within normal limits.  SPLEEN: Within normal limits.  PANCREAS: Within normal limits.  ADRENALS: Within normal limits.  KIDNEYS/URETERS: Subcentimeter right renal hypodensity too small to   characterize. There is a subcentimeter left renal hypodensity, also too   small to characterize, though may be higher than simple fluid density and   is indeterminate. On previous MRI of 2/26/2022 there is a punctate cyst   seen in this region. It is unclear if this represents interval growth of   the same lesion or a separate lesion altogether.    BLADDER: Diffusely thick-walled.  REPRODUCTIVE ORGANS: Prostate gland is normal in size.    BOWEL: No bowel obstruction. Appendix is normal. There is a right lower   quadrant diverting ileostomy. Diffuse rectal wall thickening is   appreciated. Linear density isseen along the anterior rectal wall (image   2:99-2:102). This may represent scarring /sequelae of a fistulous tract.   Additionally there is thickening of the right levator ani complex, with   associated increased linear density, which appears contiguous with the   external anal sphincter on the right. This is similar compared to   previous exam. There are 2 setons noted along the posterior rectum.   Gastric wall thickening is noted.  PERITONEUM: No ascites. No free air.  VESSELS: Within normal limits.  RETROPERITONEUM/LYMPH NODES: No pathologically enlarged lymph nodes.  ABDOMINAL WALL: Postsurgical changes.  BONES: Old left posterior 11th rib fracture. Degenerative changes of the   spine appreciated.    IMPRESSION:  Inflammatory changes ofthe rectum and anus with two setons seen   posteriorly. Additional linear density seen anterior to the rectum may be   sequelae of a previous fistulous tract. Additional thickening of the   right levator ani complex with associated increased linear density and   involvement of at least the external anal sphincter on the right is   compatible with an additional fistulous tract.    Thick-walled urinary bladder which may be related to its underdistended   nature versus cystitis. Correlate with urinalysis.    Gastric wall thickening suspicious for gastritis though may also be   related to underdistention.    Indeterminate subcentimeter hypodense lesion of the left kidney. This is   too small to accurately characterize. A previous MRI of 2/26/2022, there   is a tiny smaller cyst seen in this region, and it is unclear if this   represents the same lesion. Further correlation with MRI can be obtained   for more definitive evaluation.        --- End of Report ---            ANITHA LINDER MD; Attending Radiologist  This document has been electronically signed. Feb 9 2024 11:59PM   concurrent bolus cisplatin, received 2 cycles to date.      Tumor response: Has had an excellent response to treatment and that there is no longer an exophytic component to his tumor on transoral exam in his base of tongue as of 4/29/2024 and nodule completely resolved on 5/6/24. Will plan to treat 6 fractions per week per RTOG 1016 and will finish CRT on Friday.     FEN: patient's weight is stable. Using liquid supplements. Aim for >2000 calories and >60grams of protein daily.   All diet is oral and no feeding tube.  Met with SLP prior to starting RT and doing daily exercises to maintain swallowing function.  Met with dietician today and we rec VHC 2-3 per day and using electrolyte solution 20 oz daily. He is not using much supplement so counseled on the importance of getting sufficient calories/calorie counting to avoid feeding tube.  He looked dehydrated last week by his laboratory testing and additional 3 pounds today.  I contacted medical oncology and they will add him on for IV fluids tomorrow, in addition to Friday.     Nausea: Reviewed use of Dexamethasone and Olanzapine after chemo each week to prevent nausea and Compazine/Zofran PRN for breakthrough nausea.      Pain control: Has new odynophagia. Gave rx for oxycodone 5 mg as needed. Taking Neurontin to 900 mg 3 times daily.     Oral Care: Patient will continue fluoride trays per dentist. Patient is using Glutamine 10g TID on days of RT to promote ongoing healing of mucositis. Rec Guaifenesin for thick saliva or Xylimelt for dry mouth     Skin Care: Continue Aquaphor within the RT field. Reviewed how to apply and to avoid within 3 hours prior to RT.      Diarrhea/Constipation: Reviewed use of Immodium for diarrhea. Reviewed use of Miralax or Senna if develops constipation.

## 2024-05-20 NOTE — PROCEDURES
Speech-Language Pathology    SLP Adult Outpatient FEES Evaluation    Patient Name: Pilo Hanks  MRN: [id number]  Today's Date: 5/20/2024   Time Calculation  Start Time: 1115  Stop Time: 1200  Time Calculation (min): 45 min       Reason for consult:  Mr. Hanks in the last week of RT with increasing difficulty swallowing.  The dysphagia history includes:      Dysphagia for solids               Yes    Dysphagia for liquids              Yes    Dysphagia for pills                  Yes  Associated weight loss            No    Recent pneumonia/bronchitis  No  GERD/Acid reflux   No    Pt endorsing coughing choking with each meal.    Recommendations:  Soft diet with naturally mildly thick liquids (cream broths, naked shakes, yogert smoothies).  Alternate mildly thick liquids and solids.  Complete oral care before and after meals.  Occasional throat clear during meals with re-swallow.  Free water protocol between meals.    FEES Impression:  Moderate oropharyngeal dysphagia secondary to edematous laryngeal and pharyngeal tissue.   Oral: Pt with reduced ability to masticate and manipulate solids in the oral cavity due to odynophagia and xerostomia. Thin liquid sips utilized to alleviate difficulty with ap propulsion with solids.   Pharyngeal: Pt with adequate white out duration followed by evidence of penetration and silent aspiration of trace amounts of thin liquids only during the swallow. Evidence consisted of trace scattered residues pooling on the true vocal folds, intra-arytenoid space and subglottic shelf. Penetrated materials did not clear with repeat swallow; placing pt at risk of aspiration over a course of a meal. Cued cough and re-swallow not effective. After the swallow; min to trace amount of pharyngeal stasis at the lateral channels and pyriforms which cleared with subsequent reflexive dry swallows.   Compensatory strategies: Chin tuck resulted in increased penetration and aspiration. Head rotation left and right  ineffective at this time.     Plan: Patient to follow up with SLP for the following sessions to maintain function:  Mid way through radiation/ week 3 or 4. Check in FEES if needed.  Within the first week after completing radiation-FEES  3 month after radiation MBSS  6 months- virtual  12 months-auto MBSS      Frequency BID  Start date of tx: 4/12/24    Goals:  Pt will produce a series of lingual exercises/stretches independent of cues 10 reps, 3 times per day, 7 days a week; to maintain strength and motility of the lingual muscles and reduce the effects of radiation for safe and efficient PO intake.  Pt will complete a series of jaw exercises/stretches independent of cues 3x a day, 7 days a week; to reduced the effects of radiation on the jaw muscles necessary to consume PO safely and efficiently.   Pt will complete a series of neck stretches independent of cues, 5 times a day for 7 days a week; to reduce the effects of radiation on the muscles necessary for optimal head position for safe and efficient PO intake.  Pt will complete effortful swallows 10 reps, 3 times per day for 7 days a week; to strengthen pharyngeal muscles for improve bolus clearance through the pharynx.    Pt will utilize and recall compensatory strategies independent of cues 100% of the time to tolerate the least restrictive diet without overt s/s of pharyngeal deficits.  Patient will tolerate the least restrictive diet without overt s/s of oropharyngeal dysphagia 10/10 trials.      Education/Treatment: SLP reviewed the expected changes from radiation treatment to the mechanics of speech and swallowing function. Dental care, and oral hygiene in relation to aspiration discussed. Given trace amounts of aspiration on this exam; SLP reviewed prognostic indicators that can contribute to higher risks of development of aspiration pneumonia. Swallow guidelines and temporary liquid modification to mildly thick liquids during meal time discussed.  Additional information on liquids that come naturally as mildly thick consistencies discussed as well as ways to testing consistency level. Pt encouraged to continue free water protocol between meals with proper oral care regiment.     SLP Outcome Measures:  [place]:  Consistencies/Score: Thin: 8 - Material enters airway, passes below cords, no effort to eject (no cough)  Consistencies/Score: Nectar Liquid: 2 - Material enters airway, remains above cords, ejected from airway  Consistencies/Score: Pudding/Puree: 1 - Material does not enter airway  Consistencies/Score: Solid: 1 - Material does not enter airway    Epiglottis:  2- Moderate edema  Vallecula:  2- Moderate edema  Pharyngoepiglottic folds:  2- Moderate edema  Aryepiglottic folds:  1- Mild edema   Arytenoids:  1- Mild edema   Pyriform sinuses:  1- Mild edema   False Vocal folds:  1- Mild edema   True Vocal folds:  1- Mild edema     FEES:  FEES Verbal Consent: Fiberoptic endoscopic evaluation of the swallow exam was completed once informed verbal consent was obtained - Yes  Scope Passed: Through right nare  Patient Tolerated Procedure: Well  FEES Oral Phase  Volitional Hold With Thin Liquid Bolus:: Yes, intact  Anterior Oral Spillage (Lip Seal):: No  Oral Residues After The Swallow:: Diffuse  FEES Anatomy: Structural Appearances  Velopharryngeal Port Closure: Complete  Vocal Fold Appearance: Edematous   Laryngeal Function:  Adduction: Full  Abduction: Full  Arytenoid Movement: Symmetrical  Vocal Quality: Clear  Secretion Management  Secretion Management: Pyriforms  Amounts/Color/Texture: Severe, Yellow, Thick, White  Patient Response to Secretions: Re-swallow, Not effective to clear (pt required head turn to assist with secretion clearance)       Thick secretions    Trace silent aspiration of thin liquids         Functional Oral Intake Scale   FIOS level Comment    Level 1  Nothing by Mouth    Level 2 Tube dependent with minimal attempts of food and liquid.     Level 3 Tube dependent with consistent oral intake of food and liquid.    Level 4 Total oral diet of a single consistency.   X Level 5 Total oral diet with multiple consistencies, but requires special preparations and compensations.    Level 6 Total oral diet with multiple consistencies without special preparations but specific food limitations.    Level 7 Total oral diet with no restrictions.

## 2024-05-21 ENCOUNTER — HOSPITAL ENCOUNTER (OUTPATIENT)
Dept: RADIATION ONCOLOGY | Facility: HOSPITAL | Age: 39
Setting detail: RADIATION/ONCOLOGY SERIES
Discharge: HOME | End: 2024-05-21
Payer: COMMERCIAL

## 2024-05-21 ENCOUNTER — INFUSION (OUTPATIENT)
Dept: HEMATOLOGY/ONCOLOGY | Facility: HOSPITAL | Age: 39
End: 2024-05-21
Payer: COMMERCIAL

## 2024-05-21 ENCOUNTER — APPOINTMENT (OUTPATIENT)
Dept: RADIATION ONCOLOGY | Facility: HOSPITAL | Age: 39
End: 2024-05-21
Payer: COMMERCIAL

## 2024-05-21 VITALS
WEIGHT: 139.11 LBS | HEIGHT: 66 IN | OXYGEN SATURATION: 100 % | HEART RATE: 90 BPM | BODY MASS INDEX: 22.36 KG/M2 | TEMPERATURE: 98.6 F | RESPIRATION RATE: 18 BRPM | SYSTOLIC BLOOD PRESSURE: 109 MMHG | DIASTOLIC BLOOD PRESSURE: 69 MMHG

## 2024-05-21 DIAGNOSIS — Z51.0 ENCOUNTER FOR ANTINEOPLASTIC RADIATION THERAPY: ICD-10-CM

## 2024-05-21 DIAGNOSIS — C10.9 MALIGNANT NEOPLASM OF OROPHARYNX (MULTI): ICD-10-CM

## 2024-05-21 DIAGNOSIS — C01 MALIGNANT NEOPLASM OF BASE OF TONGUE (MULTI): ICD-10-CM

## 2024-05-21 LAB
RAD ONC MSQ ACTUAL FRACTIONS DELIVERED: 31
RAD ONC MSQ ACTUAL SESSION DELIVERED DOSE: 200 CGRAY
RAD ONC MSQ ACTUAL TOTAL DOSE: 6200 CGRAY
RAD ONC MSQ ELAPSED DAYS: 35
RAD ONC MSQ LAST DATE: NORMAL
RAD ONC MSQ PRESCRIBED FRACTIONAL DOSE: 200 CGRAY
RAD ONC MSQ PRESCRIBED NUMBER OF FRACTIONS: 35
RAD ONC MSQ PRESCRIBED TECHNIQUE: NORMAL
RAD ONC MSQ PRESCRIBED TOTAL DOSE: 7000 CGRAY
RAD ONC MSQ PRESCRIPTION PATTERN COMMENT: NORMAL
RAD ONC MSQ START DATE: NORMAL
RAD ONC MSQ TREATMENT COURSE NUMBER: 1
RAD ONC MSQ TREATMENT SITE: NORMAL

## 2024-05-21 PROCEDURE — 2500000004 HC RX 250 GENERAL PHARMACY W/ HCPCS (ALT 636 FOR OP/ED): Performed by: STUDENT IN AN ORGANIZED HEALTH CARE EDUCATION/TRAINING PROGRAM

## 2024-05-21 PROCEDURE — 77386 HC INTENSITY-MODULATED RADIATION THERAPY (IMRT), COMPLEX: CPT | Performed by: RADIOLOGY

## 2024-05-21 PROCEDURE — 77014 CHG CT GUIDANCE RADIATION THERAPY FLDS PLACEMENT: CPT | Performed by: RADIOLOGY

## 2024-05-21 PROCEDURE — 96360 HYDRATION IV INFUSION INIT: CPT | Mod: INF

## 2024-05-21 RX ADMIN — SODIUM CHLORIDE 1000 ML: 9 INJECTION, SOLUTION INTRAVENOUS at 09:43

## 2024-05-21 NOTE — PROGRESS NOTES
Pt arrived ambulatory for IVF  Denies any new or worsening symptoms.  Tolerated fluids without issue.  Discharged in stable condition.

## 2024-05-22 ENCOUNTER — APPOINTMENT (OUTPATIENT)
Dept: RADIATION ONCOLOGY | Facility: HOSPITAL | Age: 39
End: 2024-05-22
Payer: COMMERCIAL

## 2024-05-22 ENCOUNTER — HOSPITAL ENCOUNTER (OUTPATIENT)
Dept: RADIATION ONCOLOGY | Facility: HOSPITAL | Age: 39
Setting detail: RADIATION/ONCOLOGY SERIES
Discharge: HOME | End: 2024-05-22
Payer: COMMERCIAL

## 2024-05-22 DIAGNOSIS — C01 MALIGNANT NEOPLASM OF BASE OF TONGUE (MULTI): ICD-10-CM

## 2024-05-22 DIAGNOSIS — Z51.0 ENCOUNTER FOR ANTINEOPLASTIC RADIATION THERAPY: ICD-10-CM

## 2024-05-22 LAB
RAD ONC MSQ ACTUAL FRACTIONS DELIVERED: 32
RAD ONC MSQ ACTUAL SESSION DELIVERED DOSE: 200 CGRAY
RAD ONC MSQ ACTUAL TOTAL DOSE: 6400 CGRAY
RAD ONC MSQ ELAPSED DAYS: 36
RAD ONC MSQ LAST DATE: NORMAL
RAD ONC MSQ PRESCRIBED FRACTIONAL DOSE: 200 CGRAY
RAD ONC MSQ PRESCRIBED NUMBER OF FRACTIONS: 35
RAD ONC MSQ PRESCRIBED TECHNIQUE: NORMAL
RAD ONC MSQ PRESCRIBED TOTAL DOSE: 7000 CGRAY
RAD ONC MSQ PRESCRIPTION PATTERN COMMENT: NORMAL
RAD ONC MSQ START DATE: NORMAL
RAD ONC MSQ TREATMENT COURSE NUMBER: 1
RAD ONC MSQ TREATMENT SITE: NORMAL

## 2024-05-22 PROCEDURE — 77014 CHG CT GUIDANCE RADIATION THERAPY FLDS PLACEMENT: CPT | Performed by: RADIOLOGY

## 2024-05-22 PROCEDURE — 77386 HC INTENSITY-MODULATED RADIATION THERAPY (IMRT), COMPLEX: CPT | Performed by: RADIOLOGY

## 2024-05-22 PROCEDURE — 77336 RADIATION PHYSICS CONSULT: CPT | Performed by: RADIOLOGY

## 2024-05-23 ENCOUNTER — HOSPITAL ENCOUNTER (OUTPATIENT)
Dept: RADIATION ONCOLOGY | Facility: CLINIC | Age: 39
Setting detail: RADIATION/ONCOLOGY SERIES
Discharge: HOME | End: 2024-05-23
Payer: COMMERCIAL

## 2024-05-23 ENCOUNTER — APPOINTMENT (OUTPATIENT)
Dept: RADIATION ONCOLOGY | Facility: HOSPITAL | Age: 39
End: 2024-05-23
Payer: COMMERCIAL

## 2024-05-23 DIAGNOSIS — C01 MALIGNANT NEOPLASM OF BASE OF TONGUE (MULTI): ICD-10-CM

## 2024-05-23 DIAGNOSIS — Z51.0 ENCOUNTER FOR ANTINEOPLASTIC RADIATION THERAPY: ICD-10-CM

## 2024-05-23 DIAGNOSIS — C10.9 MALIGNANT NEOPLASM OF OROPHARYNX (MULTI): Primary | ICD-10-CM

## 2024-05-23 LAB
RAD ONC MSQ ACTUAL FRACTIONS DELIVERED: 33
RAD ONC MSQ ACTUAL SESSION DELIVERED DOSE: 200 CGRAY
RAD ONC MSQ ACTUAL TOTAL DOSE: 6600 CGRAY
RAD ONC MSQ ELAPSED DAYS: 37
RAD ONC MSQ LAST DATE: NORMAL
RAD ONC MSQ PRESCRIBED FRACTIONAL DOSE: 200 CGRAY
RAD ONC MSQ PRESCRIBED NUMBER OF FRACTIONS: 35
RAD ONC MSQ PRESCRIBED TECHNIQUE: NORMAL
RAD ONC MSQ PRESCRIBED TOTAL DOSE: 7000 CGRAY
RAD ONC MSQ PRESCRIPTION PATTERN COMMENT: NORMAL
RAD ONC MSQ START DATE: NORMAL
RAD ONC MSQ TREATMENT COURSE NUMBER: 1
RAD ONC MSQ TREATMENT SITE: NORMAL

## 2024-05-23 PROCEDURE — 77386 HC INTENSITY-MODULATED RADIATION THERAPY (IMRT), COMPLEX: CPT | Performed by: RADIOLOGY

## 2024-05-23 PROCEDURE — 77014 CHG CT GUIDANCE RADIATION THERAPY FLDS PLACEMENT: CPT | Performed by: RADIOLOGY

## 2024-05-24 ENCOUNTER — HOSPITAL ENCOUNTER (OUTPATIENT)
Dept: RADIATION ONCOLOGY | Facility: HOSPITAL | Age: 39
Setting detail: RADIATION/ONCOLOGY SERIES
Discharge: HOME | End: 2024-05-24
Payer: COMMERCIAL

## 2024-05-24 ENCOUNTER — RADIATION ONCOLOGY OTV (OUTPATIENT)
Dept: RADIATION ONCOLOGY | Facility: HOSPITAL | Age: 39
End: 2024-05-24
Payer: COMMERCIAL

## 2024-05-24 ENCOUNTER — DOCUMENTATION (OUTPATIENT)
Dept: RADIATION ONCOLOGY | Facility: HOSPITAL | Age: 39
End: 2024-05-24

## 2024-05-24 ENCOUNTER — APPOINTMENT (OUTPATIENT)
Dept: RADIATION ONCOLOGY | Facility: HOSPITAL | Age: 39
End: 2024-05-24
Payer: COMMERCIAL

## 2024-05-24 ENCOUNTER — INFUSION (OUTPATIENT)
Dept: HEMATOLOGY/ONCOLOGY | Facility: HOSPITAL | Age: 39
End: 2024-05-24
Payer: COMMERCIAL

## 2024-05-24 VITALS
HEIGHT: 68 IN | WEIGHT: 149.7 LBS | OXYGEN SATURATION: 100 % | SYSTOLIC BLOOD PRESSURE: 104 MMHG | TEMPERATURE: 96.8 F | RESPIRATION RATE: 18 BRPM | DIASTOLIC BLOOD PRESSURE: 70 MMHG | HEART RATE: 104 BPM | BODY MASS INDEX: 22.69 KG/M2

## 2024-05-24 DIAGNOSIS — C01 MALIGNANT NEOPLASM OF BASE OF TONGUE (MULTI): ICD-10-CM

## 2024-05-24 DIAGNOSIS — C10.9 MALIGNANT NEOPLASM OF OROPHARYNX (MULTI): Primary | ICD-10-CM

## 2024-05-24 DIAGNOSIS — Z51.0 ENCOUNTER FOR ANTINEOPLASTIC RADIATION THERAPY: ICD-10-CM

## 2024-05-24 LAB
ALBUMIN SERPL BCP-MCNC: 4 G/DL (ref 3.4–5)
ALP SERPL-CCNC: 46 U/L (ref 33–120)
ALT SERPL W P-5'-P-CCNC: 9 U/L (ref 10–52)
ANION GAP SERPL CALC-SCNC: 18 MMOL/L (ref 10–20)
AST SERPL W P-5'-P-CCNC: 11 U/L (ref 9–39)
BILIRUB SERPL-MCNC: 0.4 MG/DL (ref 0–1.2)
BUN SERPL-MCNC: 21 MG/DL (ref 6–23)
CALCIUM SERPL-MCNC: 8.6 MG/DL (ref 8.6–10.3)
CHLORIDE SERPL-SCNC: 99 MMOL/L (ref 98–107)
CO2 SERPL-SCNC: 25 MMOL/L (ref 21–32)
CREAT SERPL-MCNC: 1.18 MG/DL (ref 0.5–1.3)
EGFRCR SERPLBLD CKD-EPI 2021: 80 ML/MIN/1.73M*2
GLUCOSE SERPL-MCNC: 71 MG/DL (ref 74–99)
POTASSIUM SERPL-SCNC: 4.4 MMOL/L (ref 3.5–5.3)
PROT SERPL-MCNC: 7 G/DL (ref 6.4–8.2)
RAD ONC MSQ ACTUAL FRACTIONS DELIVERED: 34
RAD ONC MSQ ACTUAL FRACTIONS DELIVERED: 35
RAD ONC MSQ ACTUAL SESSION DELIVERED DOSE: 200 CGRAY
RAD ONC MSQ ACTUAL SESSION DELIVERED DOSE: 200 CGRAY
RAD ONC MSQ ACTUAL TOTAL DOSE: 6800 CGRAY
RAD ONC MSQ ACTUAL TOTAL DOSE: 7000 CGRAY
RAD ONC MSQ ELAPSED DAYS: 38
RAD ONC MSQ ELAPSED DAYS: 38
RAD ONC MSQ LAST DATE: NORMAL
RAD ONC MSQ LAST DATE: NORMAL
RAD ONC MSQ PRESCRIBED FRACTIONAL DOSE: 200 CGRAY
RAD ONC MSQ PRESCRIBED FRACTIONAL DOSE: 200 CGRAY
RAD ONC MSQ PRESCRIBED NUMBER OF FRACTIONS: 35
RAD ONC MSQ PRESCRIBED NUMBER OF FRACTIONS: 35
RAD ONC MSQ PRESCRIBED TECHNIQUE: NORMAL
RAD ONC MSQ PRESCRIBED TECHNIQUE: NORMAL
RAD ONC MSQ PRESCRIBED TOTAL DOSE: 7000 CGRAY
RAD ONC MSQ PRESCRIBED TOTAL DOSE: 7000 CGRAY
RAD ONC MSQ PRESCRIPTION PATTERN COMMENT: NORMAL
RAD ONC MSQ PRESCRIPTION PATTERN COMMENT: NORMAL
RAD ONC MSQ START DATE: NORMAL
RAD ONC MSQ START DATE: NORMAL
RAD ONC MSQ TREATMENT COURSE NUMBER: 1
RAD ONC MSQ TREATMENT COURSE NUMBER: 1
RAD ONC MSQ TREATMENT SITE: NORMAL
RAD ONC MSQ TREATMENT SITE: NORMAL
SODIUM SERPL-SCNC: 138 MMOL/L (ref 136–145)

## 2024-05-24 PROCEDURE — 84075 ASSAY ALKALINE PHOSPHATASE: CPT

## 2024-05-24 PROCEDURE — 2500000004 HC RX 250 GENERAL PHARMACY W/ HCPCS (ALT 636 FOR OP/ED): Performed by: STUDENT IN AN ORGANIZED HEALTH CARE EDUCATION/TRAINING PROGRAM

## 2024-05-24 PROCEDURE — 77386 HC INTENSITY-MODULATED RADIATION THERAPY (IMRT), COMPLEX: CPT | Performed by: RADIOLOGY

## 2024-05-24 PROCEDURE — 77386 HC INTENSITY-MODULATED RADIATION THERAPY (IMRT), COMPLEX: CPT | Mod: XE | Performed by: RADIOLOGY

## 2024-05-24 PROCEDURE — 96360 HYDRATION IV INFUSION INIT: CPT | Mod: INF

## 2024-05-24 PROCEDURE — 77014 CHG CT GUIDANCE RADIATION THERAPY FLDS PLACEMENT: CPT | Performed by: STUDENT IN AN ORGANIZED HEALTH CARE EDUCATION/TRAINING PROGRAM

## 2024-05-24 PROCEDURE — 77427 RADIATION TX MANAGEMENT X5: CPT | Performed by: STUDENT IN AN ORGANIZED HEALTH CARE EDUCATION/TRAINING PROGRAM

## 2024-05-24 RX ORDER — HEPARIN SODIUM,PORCINE/PF 10 UNIT/ML
50 SYRINGE (ML) INTRAVENOUS AS NEEDED
Status: CANCELLED | OUTPATIENT
Start: 2024-05-24

## 2024-05-24 RX ORDER — HEPARIN 100 UNIT/ML
500 SYRINGE INTRAVENOUS AS NEEDED
Status: CANCELLED | OUTPATIENT
Start: 2024-05-24

## 2024-05-24 RX ADMIN — SODIUM CHLORIDE 1000 ML: 9 INJECTION, SOLUTION INTRAVENOUS at 11:27

## 2024-05-24 ASSESSMENT — ENCOUNTER SYMPTOMS
NAUSEA: 0
VOMITING: 0
COUGH: 0
HEADACHES: 0
TROUBLE SWALLOWING: 1
CONSTIPATION: 1
SHORTNESS OF BREATH: 0
NUMBNESS: 0
FEVER: 0
ARTHRALGIAS: 0
LEG SWELLING: 0
ABDOMINAL PAIN: 0
CHILLS: 0
LIGHT-HEADEDNESS: 0
SORE THROAT: 1

## 2024-05-24 ASSESSMENT — PAIN SCALES - GENERAL: PAINLEVEL: 5

## 2024-05-24 NOTE — PROGRESS NOTES
Patient ID: Pilo Hanks is a 39 y.o. male.  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 Phi Cruz  MedOnc:  Dr. Kyunghee Burkitt/Shaila Fitzgerald PA-C  RadOnc: Dr. Raquel Blackman    Current Therapy  4/16/24:     Sites of Disease  Left BOT  B/L cervical LNs    Oncologic Issues    ONCOLOGIC HISTORY  - 02/2024: Presented to PCP with mass in left tonsillar area. Referred to ENT  - 3/15/24: In office biopsy of left BOT w/ Dr Cruz.  Path showed p16 positive SCC  - 3/13/29/24: CT neck and chest showed a soft tissue mass within the left oropharynx centered in the anterior tonsillar pillar/left base of tongue, measures approximately 1.8 x 1.4cm.  - 4/1/24: PET/CT showed multiple hypermetabolic bilateral multilevel cervical lymphadenopathy, largest of which located at left cervical level 2, ~1.2cm.  - 4/5/24 [facility]: Rec surgical resection vs chemoRT vs clinical trials , [id number]    Past Medical History:   Past Medical History:  01/21/2016: Acute tonsillitis, unspecified      Comment:  Acute tonsillitis, unspecified etiology  01/11/2017: Hypertrophy of tonsils      Comment:  Lingual tonsil hypertrophy  01/05/2017: Hypertrophy of tonsils      Comment:  Tonsillar hypertrophy  No date: Other specified health status      Comment:  No pertinent past medical history  09/14/2021: Pain in right toe(s)      Comment:  Great toe pain, right  09/15/2021: Pain in unspecified ankle and joints of unspecified foot      Comment:  Ankle pain  07/27/2021: Personal history of other diseases of the nervous system   and sense organs      Comment:  History of obstructive sleep apnea  07/27/2021: Personal history of other diseases of the respiratory   system      Comment:  History of acute pharyngitis  01/21/2016: Personal history of other diseases of the respiratory   system      Comment:  History of sore throat  01/05/2017: Personal history of  other diseases of the respiratory   system      Comment:  History of airway obstruction  2021: Personal history of other specified conditions      Comment:  History of chest pain  2021: Sprain of joints and ligaments of unspecified parts of   neck, initial encounter      Comment:  Neck sprain  2017: Sprain of unspecified ligament of right ankle, initial   encounter      Comment:  Sprain of right ankle, unspecified ligament, initial                encounter   Surgical History:    Past Surgical History:   Procedure Laterality Date    MOUTH SURGERY  2017    Oral Surgery Tooth Extraction    VASECTOMY Bilateral       Family History:    Family History   Problem Relation Name Age of Onset    No Known Problems Mother      No Known Problems Father      Diabetes Paternal Grandfather      Heart failure Paternal Grandfather      Testicular cancer Other Maternal Cousin 40 - 49    Prostate cancer Other Paternal uncle      Family Oncology History:    Cancer-related family history includes Prostate cancer in an other family member; Testicular cancer (age of onset: 40 - 49) in an other family member.  Social History:    Social History     Tobacco Use    Smoking status: Former     Current packs/day: 0.00     Average packs/day: 0.3 packs/day for 15.0 years (3.8 ttl pk-yrs)     Types: Cigarettes     Start date:      Quit date:      Years since quittin.3    Smokeless tobacco: Never   Vaping Use    Vaping status: Never Used   Substance Use Topics    Alcohol use: Not Currently    Drug use: Yes     Types: Marijuana     Comment: Edibles          Subjective   Chief Complaint: Squamous Cell Carcinoma of left base of tongue    RUFINO Hanks is a 39 y.o. male with recent diagnosis of T1N2M0 P16+ Squamous Cell Carcinoma of left BOT. Who is completed chemoRT w/ 2 cycles HD Cis and 70gy RT on 24.    Interval History  Patient presents on last day or RT, reports the following:  ***    His energy is  lower but is still working. Appetite is ok.   Has increased throat pain with swallowing, due to radiation. Currently taking Gabapentin 800mg TID, Oxy 5mg Q6hr. Has not been taking BMX prior to meals.   He endorse dysphagia, some coughing after eating/drinking. Has exercises from SLP that he is doing.    He's drinking Boost VHC 2-3 x per day to supplement intake  Denies any nausea /vomiting after chemo. Took Olanzapine, Dex, Compazine and Zofran prophylactically.   He had hiccups that did not resolve with baclofen.  Had diarrhea after chemo took Imodium but then became constipated. Last BM 3 days ago. Has Miralax for PRN use.   Had tinnitus following C1 but improved with time. Denies subjective hearing loss.      ROS  Review of Systems   Constitutional:  Negative for chills and fever.   HENT:   Positive for mouth sores, sore throat, tinnitus and trouble swallowing. Negative for hearing loss, lump/mass and nosebleeds.    Respiratory:  Negative for cough and shortness of breath.    Cardiovascular:  Negative for chest pain and leg swelling.   Gastrointestinal:  Positive for constipation. Negative for abdominal pain, nausea and vomiting.   Musculoskeletal:  Negative for arthralgias.   Skin:  Negative for rash.   Neurological:  Negative for headaches, light-headedness and numbness.       Allergies  No Known Allergies     Medications  Current Outpatient Medications   Medication Instructions    allopurinol (ZYLOPRIM) 100 mg, oral, Daily    ascorbic acid (VITAMIN C) 1,000 mg, oral, Daily    baclofen (LIORESAL) 5 mg, oral, 3 times daily    colchicine 0.6 mg tablet Use 2 tbs at the sign of gout PO can repeat in 1 hr if still with pain X 1    dexAMETHasone (DECADRON) 8 mg, oral, Daily, For 3 days starting the day after treatment.    gabapentin (NEURONTIN) 800 mg, oral, 3 times daily    loperamide (IMODIUM) 2 mg, oral, 4 times daily PRN, Initial: 4 mg, followed by 2 mg every 2 to 4 hours or after each loose stoo    magnesium  oxide (MAG-OX) 400 mg, oral, Daily    OLANZapine (ZYPREXA) 5 mg, oral, Nightly, For 4 days starting the evening of treatment.    ondansetron (ZOFRAN) 8 mg, oral, Every 8 hours PRN    oxyCODONE (ROXICODONE) 5 mg, oral, Every 6 hours PRN    prochlorperazine (COMPAZINE) 10 mg, oral, Every 6 hours PRN    triamcinolone (Kenalog) 0.1 % cream Topical, 2 times daily, Apply to affected area 1-2 times daily as needed.          Objective   VS: There were no vitals taken for this visit.  Weight: Daily Weight  05/24/24 : 67.9 kg (149 lb 11.2 oz)  05/21/24 : 63.1 kg (139 lb 1.8 oz)  05/20/24 : 68.8 kg (151 lb 11.2 oz)  05/17/24 : 68.2 kg (150 lb 5.7 oz)  05/16/24 : 70.4 kg (155 lb 4.8 oz)  05/15/24 : 68.3 kg (150 lb 9.6 oz)  05/13/24 : 70.2 kg (154 lb 12.2 oz)      Physical Exam  Constitutional:       Appearance: Normal appearance.   HENT:      Head: Normocephalic and atraumatic.      Mouth/Throat:      Lips: Pink.      Mouth: Mucous membranes are moist. Oral lesions present.        Comments: Left BOT mass no longer visible  Mucositis in left Posterior oropharynx   Eyes:      Extraocular Movements: Extraocular movements intact.      Pupils: Pupils are equal, round, and reactive to light.   Cardiovascular:      Rate and Rhythm: Normal rate and regular rhythm.      Pulses: Normal pulses.   Pulmonary:      Effort: Pulmonary effort is normal.      Breath sounds: Normal breath sounds.   Abdominal:      General: Bowel sounds are normal.   Musculoskeletal:         General: Normal range of motion.   Skin:     General: Skin is warm.   Neurological:      General: No focal deficit present.      Mental Status: He is alert and oriented to person, place, and time.   Psychiatric:         Mood and Affect: Mood normal.         Diagnostic Results   Labs  Below labs are reviewed today.         Results from last 7 days   Lab Units 05/24/24  1250   GLUCOSE mg/dL 71*   SODIUM mmol/L 138   POTASSIUM mmol/L 4.4   CHLORIDE mmol/L 99   CO2 mmol/L 25   BUN  "mg/dL 21   CREATININE mg/dL 1.18   EGFR mL/min/1.73m*2 80   CALCIUM mg/dL 8.6   ALBUMIN g/dL 4.0   PROTEIN TOTAL g/dL 7.0   BILIRUBIN TOTAL mg/dL 0.4   ALK PHOS U/L 46   ALT U/L 9*   AST U/L 11                     Images  3/29/24 CT neck w/ contrast:   IMPRESSION:  Ill-defined soft tissue prominence within the left oropharynx centered in the anterior tonsillar pillar/left base of tongue, may measure approximately 1.8 cm, and likely corresponds to lesion visualized on clinical exam. No involvement of the parapharyngeal  spaces or osseous involvement.  Soft tissue filling the vallecula is thought to represent lingual tonsil.  Bilateral prominent cervical lymph nodes, largest in left level 2A measuring 1.2 cm. No enlarged lymph nodes by size criteria.     3/29/24 CT chest w/o contrast:   IMPRESSION:  1. There is a 3 mm left lower lobe nodule and a 2-3 mm left upper lobe nodule.  2. No evidence for lymphadenopathy. Probable hepatic cysts as described.  3. Incidental note is made of residual thymic tissue within the anterior mediastinum.     4/1/24 PET CT:  IMPRESSION:  1. Ill-defined large focal hypermetabolic soft tissue lesion centered in the anterior tonsillar pillar/left base of tongue bulging into the oropharynx, consistent with biopsy-proven squamous cell carcinoma.  2. Hypermetabolic focus at right tongue base, which could be physiologic, although neoplastic disease can not be excluded.  3. Multiple hypermetabolic bilateral multilevel cervical lymphadenopathy, largest of which located at left cervical level 2, as detailed above, compatible with locoregional metastatic omer  disease.  4. No evidence of hypermetabolic distant metastatic disease.    Pathology  No results found for: \"KBXHX\", \"PATHREP\", \"INTERP\", \"ADD1\", \"ADD2\", \"ADD3\", \"CORRECTIONHX\", \"ASTUDIES\", \"ANCILLARY1\", \"ANCILLARY2\", \"FINALINTERP\", \"COMDX\"      Assessment/Plan   Mr. Hanks is 40 y/o male with recent diagnosis of T2N2M0, P16+  left " base of tongue squamous cell carcinoma who started chemoRT w/ HD Cis on 4/16/2024.    # Stage II (T1N2M0) p16 positive left base of tongue squamous cell carcinoma   - PET/CT showed uptake in left base of tongue, measures approximately 1.8 x 1.4cm as well as multiple hypermetabolic bilateral multilevel cervical lymphadenopathy, largest of which located at left cervical level 2, ~1.2cm.  - GFR >90 (3/25/24 labs), no hearing deficits  - Discussed with patient about SOC (concurrent chemorad with high dose cisplatin) vs clinical trial (: High dose cisplatin vs weekly cis or [id number]: CRT followed by Nivolumab), patient might be interested in clinical trial, he will think about it over the weekend and let us know.  - 4/16/24: Patient started on chemoRT w/ HD Cisplatin. Feeling well, left tongue mass not subjectively increased in size. Mild dysphagia. Patient is deciding on 6 vs 7 weeks of RT and will get back to us. Patient is young (38yo) and has 3 children ages 3, 9 and 16 at home. Will refer him to Chaya Augustine NP for young adult services.    - 4/25/24: post chemo he experienced hiccups, likely from dex, has few episodes of diarrhea but resolved.  Otherwise, tolerated chemo well.  Cre 1.48 GFR 61 (baseline 73-83), improved from 4/19 labs, but will give 1L hydration today.  - 5/7/24: Patient tolerated C1 well but had some decrease in renal function, and hiccups that were not resolved with Baclofen. He had very good response so far as his left BOT mass is no longer visit. He does have mucositis in this area and has increasing odynophagia. Continue current pain regimen, continue Boost VHC x2/day.  - 5/28/24: ***    PLAN  -- Proceed to C2 HD Cis today  -- Continue IVF on every Friday and additional IVF next Monday for hydration support.   -- RTC 1 week for Tox Check w/ MedOnc  -- START BMX prior to meals, continue Gabapentin 800mg TID, Oxycodone 5-10mg Q6hr PRN.   -- Continue ample oral hydration with pedialyte powder.    -- Can omit Dexamethasone this week as pt had intractable hiccups and did not have any nausea  -- Referral for young adult oncology services  (BRETT Augustine, JOHN)

## 2024-05-24 NOTE — PROGRESS NOTES
"Radiation Oncology On Treatment Visit    Patient Name:  Pilo Hanks  MRN:  [id number]  :  1985    Referring Provider: No ref. provider found  Primary Care Provider: Ruth Villa DO  Care Team: Patient Care Team:  Ruth Villa DO as PCP - General  Ruth Villa DO as PCP - Nnacy ACO PCP  Raquel Blackman MD as Radiation Oncologist (Radiation Oncology)  Kyunghee Burkitt, DO as Consulting Physician (Hematology and Oncology)  Rosina Fitzgerald PA-C as Physician Assistant (Hematology and Oncology)    Date of Service: 2024     Diagnosis:   Specialty Problems       Radiation Oncology Problems    Malignant neoplasm of oropharynx (Multi)     Treatment Summary:  IMRT: Bilateral Head and neck    Treatment Period Technique Fraction Dose Fractions Total Dose   Course 1 2024-2024  (days elapsed: 38)         BOT + nodes 2024-2024 VMAT 200 / 200 cGy 34 / 35 6800 / 7,000 cGy     SUBJECTIVE: pain with swallowing has worsened. Taking oxycodone 10mg made him very drowsy. Recommend trying oxycodone q4h instead of q8h, and take prior to eating. Weight down by 2 lbs. Denies any nausea, vomiting, dizziness. Getting IV fluids today.     OBJECTIVE:   Vital Signs:  /70   Pulse 104   Temp 36 °C (96.8 °F) (Temporal)   Resp 18   Ht 1.727 m (5' 8\")   Wt 67.9 kg (149 lb 11.2 oz)   SpO2 100%   BMI 22.76 kg/m²    Pain Scale: The patient's current pain level was assessed.  They report currently having a pain of 5 out of 10 in throat with swallowing.  Thick mucous secretions. No mucositis. No oral ulcers near where dental fillings are.  Skin: hypopigmented regions on anterior neck consistent with radiation dermatitis  5/15/24 CBC: not neuroptenic; lymphopenic (ALN 0.17)  5/15/24 CMP: Cr 1.7  Weight down 2lbs    Other Pertinent Findings:     Toxicity Assessment          2024    12:30 2024    10:06 2024    10:11 2024    10:23 2024    09:09 2024    10:00   Toxicity Assessment "   Adverse Events Reviewed (WDL) No (Exceptions to WDL) No (Exceptions to WDL) No (Exceptions to WDL) No (Exceptions to WDL) No (Exceptions to WDL) No (Exceptions to WDL)   Treatment  and neck Head and neck Head and neck Head and neck Head and neck Head and neck   Anorexia Grade 0 Grade 0       loss of .2lbs since last week, PO intake still normal Grade 1       increase in painful swallowing, loss of 1.5lbs since last week. BMX? Grade 2       loss of 7.25lbs since last week, Dietician following Grade 2       loss of 3lbs since last week, dietician following. loss of 10% since start reached Grade 2       last 2 lbs since last week, encouraged high calorie diet   Dehydration Grade 1       Pt received IV hydration today Grade 0       BP WNL Grade 1       Ortho positive. fluids encouraged. Grade 1       getting IV hydration, PO intake trying to keep up Grade 0       intake of fluids normal, BP normal Grade 0   Dermatitis Radiation Grade 0       creams at home Grade 0       creams at home Grade 1       Some slight redness/darkening. Creams provided and at home Grade 1       skin darkening, Creams at home Grade 1       dry skin, creams at home Grade 1       darkened skin around neck - using aquaphor daily   Diarrhea  Grade 0 Grade 0       Constipation at times, Mirilax at home  Grade 0 Grade 0   Fatigue Grade 0 Grade 1       Tired over the weekend, Rest and activity balance Grade 1       Not very tired but energy level is lower. Rest and activity balance Grade 1       Slept more than normal over the weekend. Rest and activity balance Grade 2       Tired nearly every day, rest and activity balance Grade 2   Nausea Grade 0 Grade 0 Grade 0 Grade 1       over the past weekend, Zofran helping Grade 0 Grade 0   Pain Grade 1       increase in Gabapentin dose Grade 1       Back of mouth/throat L side, Gabapentin at home Grade 1       Throat/Mouth Grade 1       Mouth/throat discomfort Grade 1       throat/neck Grade 1        5 out of 10 pain in throat with swallowing - using gabapentin and oxy Q8 hours   Vomiting Grade 0 Grade 0 Grade 0 Grade 1       episodes over the weekend, Meds at home Grade 0       thick saliva gagged and produced emesis Grade 1       vomitted due to thick mucus 2 times in last 24 hours   Dysphagia Grade 1       dry mouth Grade 1       increase in painfull swallowing Grade 1       increase in pain/possible aspiration. SLP following Grade 1       thick mucous, worried about choking Grade 1       painful at times, visit with SLP today Grade 1       eating softer foods,   Mucositis Oral Grade 0       Glutamine at home Grade 0       Glutamine at home Grade 1       Sore present with general redness, Glutamine at home Grade 1       redness and sores present, Glutamine at home Grade 1       redness, with one sore visible, Glutamine at home Grade 1       sore in back of throat, using glutamine and salt and soda mixes   Hearing Impaired  Grade 0       Dry Mouth Grade 1       throughout the day, pt sipping water helps. Grade 1       Sipping water, worse in the morning Grade 1       Worse in the AM, Gultamine and Humidifier encouraged Grade 1       Worse is the AM, Glutamine at home Grade 2       thick mucous, worse in the AM. Glutamine and Salt/soda at home Grade 2       thick mucus and dry mouth - using glutamine rinses and salt and soda rinses   Oral Pain Grade 0 Grade 0 Grade 1       increase in burning while eating Grade 1       some sores, Gabapentin at home Grade 0 Grade 0   Aspiration Grade 0 Grade 0 Grade 0 Grade 0 Grade 0 Grade 0   Hoarseness Grade 0 Grade 0 Grade 0 Grade 0 Grade 0 Grade 1       occasional   Voice Alteration Grade 0 Grade 0 Grade 0 Grade 0 Grade 0 Grade 0        Assessment / Plan:  The patient is tolerating radiation therapy as anticipated.  Continue per current treatment plan.     - increase baking soda rinses  - oxycodone 5mg q4h PRN, on good bowel regimen. Discussed taking oxy prior to meals to  help with food intake.  -continue gabapentin  -BMX script provided    Cassy Valenzuela MD PhD  , Radiation Oncology

## 2024-05-27 ENCOUNTER — HOSPITAL ENCOUNTER (INPATIENT)
Facility: HOSPITAL | Age: 39
LOS: 3 days | Discharge: HOME | DRG: 391 | End: 2024-06-01
Attending: EMERGENCY MEDICINE | Admitting: HOSPITALIST
Payer: COMMERCIAL

## 2024-05-27 ENCOUNTER — APPOINTMENT (OUTPATIENT)
Dept: RADIOLOGY | Facility: HOSPITAL | Age: 39
DRG: 391 | End: 2024-05-27
Payer: COMMERCIAL

## 2024-05-27 DIAGNOSIS — K14.8 MASS OF TONGUE: ICD-10-CM

## 2024-05-27 DIAGNOSIS — C10.9 MALIGNANT NEOPLASM OF OROPHARYNX (MULTI): ICD-10-CM

## 2024-05-27 DIAGNOSIS — R11.11 VOMITING WITHOUT NAUSEA, UNSPECIFIED VOMITING TYPE: Primary | ICD-10-CM

## 2024-05-27 LAB
ALBUMIN SERPL BCP-MCNC: 4.5 G/DL (ref 3.4–5)
ALP SERPL-CCNC: 59 U/L (ref 33–120)
ALT SERPL W P-5'-P-CCNC: 7 U/L (ref 10–52)
ANION GAP SERPL CALC-SCNC: 24 MMOL/L (ref 10–20)
APTT PPP: 29 SECONDS (ref 27–38)
AST SERPL W P-5'-P-CCNC: 11 U/L (ref 9–39)
ATRIAL RATE: 88 BPM
BASOPHILS # BLD AUTO: 0.02 X10*3/UL (ref 0–0.1)
BASOPHILS NFR BLD AUTO: 0.9 %
BILIRUB SERPL-MCNC: 0.4 MG/DL (ref 0–1.2)
BUN SERPL-MCNC: 26 MG/DL (ref 6–23)
CALCIUM SERPL-MCNC: 9.9 MG/DL (ref 8.6–10.6)
CARDIAC TROPONIN I PNL SERPL HS: 8 NG/L (ref 0–53)
CHLORIDE SERPL-SCNC: 95 MMOL/L (ref 98–107)
CO2 SERPL-SCNC: 22 MMOL/L (ref 21–32)
CREAT SERPL-MCNC: 1.52 MG/DL (ref 0.5–1.3)
EGFRCR SERPLBLD CKD-EPI 2021: 59 ML/MIN/1.73M*2
EOSINOPHIL # BLD AUTO: 0.01 X10*3/UL (ref 0–0.7)
EOSINOPHIL NFR BLD AUTO: 0.4 %
ERYTHROCYTE [DISTWIDTH] IN BLOOD BY AUTOMATED COUNT: 12.6 % (ref 11.5–14.5)
GLUCOSE SERPL-MCNC: 88 MG/DL (ref 74–99)
HCT VFR BLD AUTO: 32.7 % (ref 41–52)
HGB BLD-MCNC: 11.5 G/DL (ref 13.5–17.5)
IMM GRANULOCYTES # BLD AUTO: 0.02 X10*3/UL (ref 0–0.7)
IMM GRANULOCYTES NFR BLD AUTO: 0.9 % (ref 0–0.9)
INR PPP: 1.3 (ref 0.9–1.1)
LYMPHOCYTES # BLD AUTO: 0.31 X10*3/UL (ref 1.2–4.8)
LYMPHOCYTES NFR BLD AUTO: 13.9 %
MAGNESIUM SERPL-MCNC: 1.83 MG/DL (ref 1.6–2.4)
MCH RBC QN AUTO: 29.2 PG (ref 26–34)
MCHC RBC AUTO-ENTMCNC: 35.2 G/DL (ref 32–36)
MCV RBC AUTO: 83 FL (ref 80–100)
MONOCYTES # BLD AUTO: 0.7 X10*3/UL (ref 0.1–1)
MONOCYTES NFR BLD AUTO: 31.4 %
NEUTROPHILS # BLD AUTO: 1.17 X10*3/UL (ref 1.2–7.7)
NEUTROPHILS NFR BLD AUTO: 52.5 %
NRBC BLD-RTO: 0 /100 WBCS (ref 0–0)
P AXIS: 83 DEGREES
P OFFSET: 219 MS
P ONSET: 175 MS
PLATELET # BLD AUTO: 322 X10*3/UL (ref 150–450)
POTASSIUM SERPL-SCNC: 4.8 MMOL/L (ref 3.5–5.3)
PR INTERVAL: 96 MS
PROT SERPL-MCNC: 8.1 G/DL (ref 6.4–8.2)
PROTHROMBIN TIME: 14.6 SECONDS (ref 9.8–12.8)
Q ONSET: 223 MS
QRS COUNT: 15 BEATS
QRS DURATION: 82 MS
QT INTERVAL: 328 MS
QTC CALCULATION(BAZETT): 396 MS
QTC FREDERICIA: 372 MS
R AXIS: 83 DEGREES
RBC # BLD AUTO: 3.94 X10*6/UL (ref 4.5–5.9)
SODIUM SERPL-SCNC: 136 MMOL/L (ref 136–145)
T AXIS: 49 DEGREES
T OFFSET: 387 MS
VENTRICULAR RATE: 88 BPM
WBC # BLD AUTO: 2.2 X10*3/UL (ref 4.4–11.3)

## 2024-05-27 PROCEDURE — 80053 COMPREHEN METABOLIC PANEL: CPT | Performed by: EMERGENCY MEDICINE

## 2024-05-27 PROCEDURE — 36415 COLL VENOUS BLD VENIPUNCTURE: CPT | Performed by: EMERGENCY MEDICINE

## 2024-05-27 PROCEDURE — 99285 EMERGENCY DEPT VISIT HI MDM: CPT | Performed by: EMERGENCY MEDICINE

## 2024-05-27 PROCEDURE — G0378 HOSPITAL OBSERVATION PER HR: HCPCS

## 2024-05-27 PROCEDURE — 83735 ASSAY OF MAGNESIUM: CPT | Performed by: EMERGENCY MEDICINE

## 2024-05-27 PROCEDURE — 84484 ASSAY OF TROPONIN QUANT: CPT | Performed by: EMERGENCY MEDICINE

## 2024-05-27 PROCEDURE — 85610 PROTHROMBIN TIME: CPT | Performed by: EMERGENCY MEDICINE

## 2024-05-27 PROCEDURE — 85025 COMPLETE CBC W/AUTO DIFF WBC: CPT | Performed by: EMERGENCY MEDICINE

## 2024-05-27 PROCEDURE — 2500000004 HC RX 250 GENERAL PHARMACY W/ HCPCS (ALT 636 FOR OP/ED)

## 2024-05-27 PROCEDURE — 71045 X-RAY EXAM CHEST 1 VIEW: CPT | Performed by: RADIOLOGY

## 2024-05-27 PROCEDURE — 85730 THROMBOPLASTIN TIME PARTIAL: CPT | Performed by: EMERGENCY MEDICINE

## 2024-05-27 PROCEDURE — 2500000005 HC RX 250 GENERAL PHARMACY W/O HCPCS

## 2024-05-27 PROCEDURE — 99222 1ST HOSP IP/OBS MODERATE 55: CPT | Performed by: STUDENT IN AN ORGANIZED HEALTH CARE EDUCATION/TRAINING PROGRAM

## 2024-05-27 PROCEDURE — 99285 EMERGENCY DEPT VISIT HI MDM: CPT | Mod: 25

## 2024-05-27 PROCEDURE — 93010 ELECTROCARDIOGRAM REPORT: CPT | Performed by: EMERGENCY MEDICINE

## 2024-05-27 PROCEDURE — 96361 HYDRATE IV INFUSION ADD-ON: CPT

## 2024-05-27 PROCEDURE — 96375 TX/PRO/DX INJ NEW DRUG ADDON: CPT

## 2024-05-27 PROCEDURE — 96374 THER/PROPH/DIAG INJ IV PUSH: CPT

## 2024-05-27 PROCEDURE — 71045 X-RAY EXAM CHEST 1 VIEW: CPT

## 2024-05-27 PROCEDURE — 93005 ELECTROCARDIOGRAM TRACING: CPT

## 2024-05-27 RX ORDER — ONDANSETRON HYDROCHLORIDE 2 MG/ML
4 INJECTION, SOLUTION INTRAVENOUS ONCE
Status: COMPLETED | OUTPATIENT
Start: 2024-05-27 | End: 2024-05-27

## 2024-05-27 RX ORDER — SODIUM CHLORIDE, SODIUM LACTATE, POTASSIUM CHLORIDE, CALCIUM CHLORIDE 600; 310; 30; 20 MG/100ML; MG/100ML; MG/100ML; MG/100ML
125 INJECTION, SOLUTION INTRAVENOUS CONTINUOUS
Status: ACTIVE | OUTPATIENT
Start: 2024-05-27 | End: 2024-05-28

## 2024-05-27 RX ORDER — KETOROLAC TROMETHAMINE 15 MG/ML
15 INJECTION, SOLUTION INTRAMUSCULAR; INTRAVENOUS ONCE
Status: COMPLETED | OUTPATIENT
Start: 2024-05-27 | End: 2024-05-27

## 2024-05-27 RX ADMIN — SODIUM CHLORIDE, POTASSIUM CHLORIDE, SODIUM LACTATE AND CALCIUM CHLORIDE 1000 ML: 600; 310; 30; 20 INJECTION, SOLUTION INTRAVENOUS at 16:23

## 2024-05-27 RX ADMIN — DIPHENHYDRAMINE HYDROCHLORIDE AND LIDOCAINE HYDROCHLORIDE AND ALUMINUM HYDROXIDE AND MAGNESIUM HYDRO 10 ML: KIT at 18:42

## 2024-05-27 RX ADMIN — KETOROLAC TROMETHAMINE 15 MG: 15 INJECTION, SOLUTION INTRAMUSCULAR; INTRAVENOUS at 16:22

## 2024-05-27 RX ADMIN — ONDANSETRON 4 MG: 2 INJECTION INTRAMUSCULAR; INTRAVENOUS at 16:22

## 2024-05-27 ASSESSMENT — LIFESTYLE VARIABLES
HAVE YOU EVER FELT YOU SHOULD CUT DOWN ON YOUR DRINKING: NO
TOTAL SCORE: 0
EVER HAD A DRINK FIRST THING IN THE MORNING TO STEADY YOUR NERVES TO GET RID OF A HANGOVER: NO
EVER FELT BAD OR GUILTY ABOUT YOUR DRINKING: NO
HAVE PEOPLE ANNOYED YOU BY CRITICIZING YOUR DRINKING: NO

## 2024-05-27 ASSESSMENT — PAIN - FUNCTIONAL ASSESSMENT
PAIN_FUNCTIONAL_ASSESSMENT: 0-10
PAIN_FUNCTIONAL_ASSESSMENT: 0-10

## 2024-05-27 ASSESSMENT — COLUMBIA-SUICIDE SEVERITY RATING SCALE - C-SSRS
1. IN THE PAST MONTH, HAVE YOU WISHED YOU WERE DEAD OR WISHED YOU COULD GO TO SLEEP AND NOT WAKE UP?: NO
6. HAVE YOU EVER DONE ANYTHING, STARTED TO DO ANYTHING, OR PREPARED TO DO ANYTHING TO END YOUR LIFE?: NO
2. HAVE YOU ACTUALLY HAD ANY THOUGHTS OF KILLING YOURSELF?: NO

## 2024-05-27 ASSESSMENT — PAIN DESCRIPTION - PROGRESSION: CLINICAL_PROGRESSION: NOT CHANGED

## 2024-05-27 ASSESSMENT — PAIN DESCRIPTION - LOCATION: LOCATION: THROAT

## 2024-05-27 ASSESSMENT — PAIN DESCRIPTION - PAIN TYPE: TYPE: ACUTE PAIN

## 2024-05-27 ASSESSMENT — PAIN SCALES - GENERAL
PAINLEVEL_OUTOF10: 0 - NO PAIN
PAINLEVEL_OUTOF10: 4

## 2024-05-27 NOTE — ED PROVIDER NOTES
CC: Weakness, Gen and Nausea     HPI:  Pilo Hanks is a 39 y.o. male with PMHx SCC of oropharynx (last cisplatin 5/7, last radiation 5/24) who presents to ED for worsening odynophagia and inability to tolerate PO.he reports that for the last week he has been unable to keep down food or fluids.  Since radiation, he has had ongoing vomiting without nausea and spitting up copious amounts of mucous. Also reports gout flare L great toe. Took colchicine yesterday without relief.    Per patient, is not on steroids with radiation due to bad reaction to steroids the first time he had them with radiation.     SLP note 5/20 with silent aspiration thin liquids. Recommended mildly thick liquids and to continue solids.    Limitations to History: none  Additional History provided by: N/A    External Records Reviewed:  Recent available ED and inpatient notes reviewed in EMR.    PMHx/PSHx:  Per HPI.   - has a past medical history of Acute tonsillitis, unspecified (01/21/2016), Hypertrophy of tonsils (01/11/2017), Hypertrophy of tonsils (01/05/2017), Other specified health status, Pain in right toe(s) (09/14/2021), Pain in unspecified ankle and joints of unspecified foot (09/15/2021), Personal history of other diseases of the nervous system and sense organs (07/27/2021), Personal history of other diseases of the respiratory system (07/27/2021), Personal history of other diseases of the respiratory system (01/21/2016), Personal history of other diseases of the respiratory system (01/05/2017), Personal history of other specified conditions (07/27/2021), Sprain of joints and ligaments of unspecified parts of neck, initial encounter (08/21/2021), and Sprain of unspecified ligament of right ankle, initial encounter (03/26/2017).  - has a past surgical history that includes Mouth surgery (01/11/2017) and Vasectomy (Bilateral, 2023).    Medications:  Reviewed in EMR. See EMR for complete list of medications and doses.    Allergies:  Patient  has no known allergies.    Social History:  - Tobacco:  reports that he quit smoking about 16 months ago. His smoking use included cigarettes. He started smoking about 16 years ago. He has a 3.8 pack-year smoking history. He has never used smokeless tobacco.   - Alcohol:  reports that he does not currently use alcohol.   - Illicit Drugs:  reports current drug use. Drug: Marijuana.     ROS:  Per HPI.     ???????????????????????????????????????????????????????????????  Triage Vitals:  T 37.4 °C (99.3 °F)  HR 85  /74  RR 17  O2 99 % None (Room air)    Physical exam:   General: Vitals reviewed, afebrile. Well-appearing, in no acute distress.  Patient frequently spitting up clear mucus.  Head: Normocephalic, atraumatic  EENT: PERRL, EOMI. Hearing grossly intact. Normal phonation. MMM. Airway patient. Some erythema of posterior oropharynx and uvula, no apparent swelling. No submandibular or submental swelling.  Neck: No midline tenderness or paraspinal tenderness. ROM intact. No LAD  Cardiac: Normal rate, regular rhythm. Normal S1 and S2.  No murmurs, gallops, rubs.   Pulmonary: Good air exchange. No stridor. Lungs clear bilaterally. No wheezes, rhonchi, or rales. No accessory muscle use.   Abdomen: Soft, nontender. No guarding or rebound. No peritoneal signs.  Extremities: No peripheral edema.  Full range of motion. Moves all extremities freely. B/l UE and RLE without tenderness, deformity, or injury. Base of L great toe with erythema, tenderness.  Skin: Warm and dry, no rashes.  Neuro: Face symmetric, speech clear. No focal neurologic deficits. Sensation equal bilaterally. No weakness.     ???????????????????????????????????????????????????????????????  ED Course:  ED Course as of 05/28/24 0058   Mon May 27, 2024   1911 XR chest 1 view  I independently interpreted the CXR w/o evidence of PTX, PNA, or widened mediastinum. Radiology read with no acute cardiopulmonary process. [HH]   1912 NEUTROPHILS (10*3/UL) IN  BLOOD BY AUTOMATED COUNT, DEANNE(!): 1.17  Neutropenia c/w recent history chemotherapy [HH]   1953 Tolerated nectar-thick liquids [HH]   Tue May 28, 2024   0054 I independently interpreted the EKG (EM resident): 88 bpm, normal sinus rhythm w/ a normal axis. NY 96, QTc 396, QRS 82. No ST segment elevations, depressions, or T wave inversions concerning for ischemia. Compared to prior EKG on 3/25/24, there is no significant change. [HH]      ED Course User Index  [HH] Mercedes Gruber MD         Diagnoses as of 05/28/24 0058   Vomiting without nausea, unspecified vomiting type       EKG & Images:  Independently reviewed, See ED Course      MDM:  Pilo Hanks is a 39 y.o. male with PMHx of SCC of oropharynx who presents with worsening odynophagia and inability to tolerate PO.  On arrival to the emergency department the patient was afebrile, hemodynamically stable.  He reports no sick symptoms apart from frequent vomiting, which is not associated with nausea.  On exam, patient has erythema to the posterior oropharynx and uvula, likely associated with radiation.  No apparent oropharyngeal, submandibular, submental swelling or concern for airway compromise.  Zofran, Toradol, and LR bolus ordered for symptomatic treatment.  Gout symptoms improved significantly after Toradol.  Patient denied nausea, stating that he felt it was his gag reflex that was setting of his vomiting, so trialed BMX while flush with temporary relief, however was still unable to tolerate PO.    Patient is neutropenic, which is consistent with his history of recent chemotherapy and radiation.  He is not febrile, so low concern for neutropenic fever.  Does not meet SIRS criteria.  Exam was inconsistent with PTA, RPA, Lemierre's, Geo's or airway edema.  CXR showed no evidence of aspiration pneumonia or other acute cardiopulmonary pathology.  EKG was normal sinus rhythm with no evidence of ischemia.    Overall, clinical presentation is most consistent  with postradiation side effects.  However, the patient was unable to tolerate PO including thickened liquids as recommended by his SLP. He was admitted to oncology service for observation in stable condition.      Final diagnoses:   [R11.11] Vomiting without nausea, unspecified vomiting type       Patient seen and staffed with Dr. Elizabeth    Social Determinants Limiting Care:  None identified    Disposition:  Admit to floor    Mercedes Gruber MD   Emergency Medicine PGY1  [facility]     Disclaimer: This note was dictated by speech recognition. Minor errors in transcription may be present     ? [employer] last updated 5/28/2024 12:58 AM        Mercedes Gruber MD  Resident  05/28/24 0058

## 2024-05-27 NOTE — ED TRIAGE NOTES
Pt a head/neck CA pt and finished radiation on Friday. Endorses weakness, N/V, and gout flare up. Unable to eat for a couple of weeks per pt.

## 2024-05-28 ENCOUNTER — CLINICAL SUPPORT (OUTPATIENT)
Dept: EMERGENCY MEDICINE | Facility: HOSPITAL | Age: 39
End: 2024-05-28
Payer: COMMERCIAL

## 2024-05-28 ENCOUNTER — APPOINTMENT (OUTPATIENT)
Dept: RADIATION ONCOLOGY | Facility: HOSPITAL | Age: 39
End: 2024-05-28
Payer: COMMERCIAL

## 2024-05-28 ENCOUNTER — APPOINTMENT (OUTPATIENT)
Dept: HEMATOLOGY/ONCOLOGY | Facility: HOSPITAL | Age: 39
End: 2024-05-28
Payer: COMMERCIAL

## 2024-05-28 LAB
HGB RETIC QN: 34 PG (ref 28–38)
IMMATURE RETIC FRACTION: 18.4 %
RETICS #: 0.05 X10*6/UL (ref 0.02–0.12)
RETICS/RBC NFR AUTO: 1.5 % (ref 0.5–2)

## 2024-05-28 PROCEDURE — 2500000001 HC RX 250 WO HCPCS SELF ADMINISTERED DRUGS (ALT 637 FOR MEDICARE OP): Performed by: STUDENT IN AN ORGANIZED HEALTH CARE EDUCATION/TRAINING PROGRAM

## 2024-05-28 PROCEDURE — 96376 TX/PRO/DX INJ SAME DRUG ADON: CPT

## 2024-05-28 PROCEDURE — 2500000002 HC RX 250 W HCPCS SELF ADMINISTERED DRUGS (ALT 637 FOR MEDICARE OP, ALT 636 FOR OP/ED)

## 2024-05-28 PROCEDURE — G0378 HOSPITAL OBSERVATION PER HR: HCPCS

## 2024-05-28 PROCEDURE — 2500000004 HC RX 250 GENERAL PHARMACY W/ HCPCS (ALT 636 FOR OP/ED): Performed by: STUDENT IN AN ORGANIZED HEALTH CARE EDUCATION/TRAINING PROGRAM

## 2024-05-28 PROCEDURE — 36415 COLL VENOUS BLD VENIPUNCTURE: CPT

## 2024-05-28 PROCEDURE — 2500000001 HC RX 250 WO HCPCS SELF ADMINISTERED DRUGS (ALT 637 FOR MEDICARE OP)

## 2024-05-28 PROCEDURE — C9113 INJ PANTOPRAZOLE SODIUM, VIA: HCPCS | Performed by: STUDENT IN AN ORGANIZED HEALTH CARE EDUCATION/TRAINING PROGRAM

## 2024-05-28 PROCEDURE — 2500000004 HC RX 250 GENERAL PHARMACY W/ HCPCS (ALT 636 FOR OP/ED)

## 2024-05-28 PROCEDURE — A4217 STERILE WATER/SALINE, 500 ML: HCPCS

## 2024-05-28 PROCEDURE — 92526 ORAL FUNCTION THERAPY: CPT | Mod: GN | Performed by: SPEECH-LANGUAGE PATHOLOGIST

## 2024-05-28 PROCEDURE — 85045 AUTOMATED RETICULOCYTE COUNT: CPT

## 2024-05-28 PROCEDURE — 96375 TX/PRO/DX INJ NEW DRUG ADDON: CPT

## 2024-05-28 PROCEDURE — 99233 SBSQ HOSP IP/OBS HIGH 50: CPT

## 2024-05-28 RX ORDER — ASCORBIC ACID 500 MG
1000 TABLET ORAL DAILY
Status: DISCONTINUED | OUTPATIENT
Start: 2024-05-28 | End: 2024-05-29

## 2024-05-28 RX ORDER — ENOXAPARIN SODIUM 100 MG/ML
40 INJECTION SUBCUTANEOUS EVERY 24 HOURS
Status: DISCONTINUED | OUTPATIENT
Start: 2024-05-28 | End: 2024-06-01 | Stop reason: HOSPADM

## 2024-05-28 RX ORDER — FLUCONAZOLE 40 MG/ML
200 POWDER, FOR SUSPENSION ORAL DAILY
Status: DISCONTINUED | OUTPATIENT
Start: 2024-05-28 | End: 2024-06-01 | Stop reason: HOSPADM

## 2024-05-28 RX ORDER — ONDANSETRON 4 MG/1
4 TABLET, FILM COATED ORAL EVERY 8 HOURS PRN
Status: DISCONTINUED | OUTPATIENT
Start: 2024-05-28 | End: 2024-05-30

## 2024-05-28 RX ORDER — SUCRALFATE 1 G/10ML
1 SUSPENSION ORAL EVERY 6 HOURS SCHEDULED
Status: DISCONTINUED | OUTPATIENT
Start: 2024-05-28 | End: 2024-06-01 | Stop reason: HOSPADM

## 2024-05-28 RX ORDER — ALLOPURINOL 100 MG/1
100 TABLET ORAL DAILY
Status: DISCONTINUED | OUTPATIENT
Start: 2024-05-28 | End: 2024-05-29

## 2024-05-28 RX ORDER — PANTOPRAZOLE SODIUM 40 MG/10ML
40 INJECTION, POWDER, LYOPHILIZED, FOR SOLUTION INTRAVENOUS DAILY
Status: DISCONTINUED | OUTPATIENT
Start: 2024-05-28 | End: 2024-06-01 | Stop reason: HOSPADM

## 2024-05-28 RX ORDER — PROCHLORPERAZINE MALEATE 10 MG
10 TABLET ORAL EVERY 6 HOURS PRN
Status: DISCONTINUED | OUTPATIENT
Start: 2024-05-28 | End: 2024-06-01 | Stop reason: HOSPADM

## 2024-05-28 RX ORDER — LANOLIN ALCOHOL/MO/W.PET/CERES
400 CREAM (GRAM) TOPICAL DAILY
Status: DISCONTINUED | OUTPATIENT
Start: 2024-05-28 | End: 2024-05-29

## 2024-05-28 RX ORDER — HYDROMORPHONE HYDROCHLORIDE 1 MG/ML
0.4 INJECTION, SOLUTION INTRAMUSCULAR; INTRAVENOUS; SUBCUTANEOUS EVERY 4 HOURS PRN
Status: DISCONTINUED | OUTPATIENT
Start: 2024-05-28 | End: 2024-06-01 | Stop reason: HOSPADM

## 2024-05-28 RX ORDER — PROCHLORPERAZINE EDISYLATE 5 MG/ML
10 INJECTION INTRAMUSCULAR; INTRAVENOUS EVERY 6 HOURS PRN
Status: DISCONTINUED | OUTPATIENT
Start: 2024-05-28 | End: 2024-06-01 | Stop reason: HOSPADM

## 2024-05-28 RX ORDER — COLCHICINE 0.6 MG/1
1.2 TABLET ORAL EVERY 12 HOURS PRN
Status: DISCONTINUED | OUTPATIENT
Start: 2024-05-28 | End: 2024-06-01 | Stop reason: HOSPADM

## 2024-05-28 RX ORDER — BACLOFEN 10 MG/1
5 TABLET ORAL ONCE
Status: COMPLETED | OUTPATIENT
Start: 2024-05-28 | End: 2024-05-28

## 2024-05-28 RX ORDER — OXYCODONE HYDROCHLORIDE 5 MG/1
5 TABLET ORAL EVERY 6 HOURS PRN
Status: DISCONTINUED | OUTPATIENT
Start: 2024-05-28 | End: 2024-06-01 | Stop reason: HOSPADM

## 2024-05-28 RX ORDER — PROCHLORPERAZINE 25 MG/1
25 SUPPOSITORY RECTAL EVERY 12 HOURS PRN
Status: DISCONTINUED | OUTPATIENT
Start: 2024-05-28 | End: 2024-06-01 | Stop reason: HOSPADM

## 2024-05-28 RX ORDER — ONDANSETRON HYDROCHLORIDE 2 MG/ML
4 INJECTION, SOLUTION INTRAVENOUS EVERY 8 HOURS PRN
Status: DISCONTINUED | OUTPATIENT
Start: 2024-05-28 | End: 2024-05-30

## 2024-05-28 RX ADMIN — PANTOPRAZOLE SODIUM 40 MG: 40 INJECTION, POWDER, FOR SOLUTION INTRAVENOUS at 10:50

## 2024-05-28 RX ADMIN — SODIUM CHLORIDE, POTASSIUM CHLORIDE, SODIUM LACTATE AND CALCIUM CHLORIDE 125 ML/HR: 600; 310; 30; 20 INJECTION, SOLUTION INTRAVENOUS at 02:09

## 2024-05-28 RX ADMIN — SUCRALFATE 1 G: 1 SUSPENSION ORAL at 18:27

## 2024-05-28 RX ADMIN — PROCHLORPERAZINE EDISYLATE 10 MG: 5 INJECTION INTRAMUSCULAR; INTRAVENOUS at 22:35

## 2024-05-28 RX ADMIN — SUCRALFATE 1 G: 1 SUSPENSION ORAL at 11:36

## 2024-05-28 RX ADMIN — BACLOFEN 5 MG: 10 TABLET ORAL at 18:27

## 2024-05-28 RX ADMIN — FLUCONAZOLE 200 MG: 40 POWDER, FOR SUSPENSION ORAL at 19:54

## 2024-05-28 RX ADMIN — ONDANSETRON 4 MG: 2 INJECTION INTRAMUSCULAR; INTRAVENOUS at 06:04

## 2024-05-28 RX ADMIN — ONDANSETRON 4 MG: 2 INJECTION INTRAMUSCULAR; INTRAVENOUS at 21:57

## 2024-05-28 SDOH — ECONOMIC STABILITY: TRANSPORTATION INSECURITY
IN THE PAST 12 MONTHS, HAS LACK OF TRANSPORTATION KEPT YOU FROM MEETINGS, WORK, OR FROM GETTING THINGS NEEDED FOR DAILY LIVING?: NO

## 2024-05-28 SDOH — ECONOMIC STABILITY: INCOME INSECURITY: IN THE LAST 12 MONTHS, WAS THERE A TIME WHEN YOU WERE NOT ABLE TO PAY THE MORTGAGE OR RENT ON TIME?: NO

## 2024-05-28 SDOH — ECONOMIC STABILITY: HOUSING INSECURITY
IN THE LAST 12 MONTHS, WAS THERE A TIME WHEN YOU DID NOT HAVE A STEADY PLACE TO SLEEP OR SLEPT IN A SHELTER (INCLUDING NOW)?: NO

## 2024-05-28 SDOH — SOCIAL STABILITY: SOCIAL INSECURITY: HAVE YOU HAD ANY THOUGHTS OF HARMING ANYONE ELSE?: NO

## 2024-05-28 SDOH — SOCIAL STABILITY: SOCIAL INSECURITY: ARE THERE ANY APPARENT SIGNS OF INJURIES/BEHAVIORS THAT COULD BE RELATED TO ABUSE/NEGLECT?: NO

## 2024-05-28 SDOH — ECONOMIC STABILITY: TRANSPORTATION INSECURITY
IN THE PAST 12 MONTHS, HAS THE LACK OF TRANSPORTATION KEPT YOU FROM MEDICAL APPOINTMENTS OR FROM GETTING MEDICATIONS?: NO

## 2024-05-28 SDOH — ECONOMIC STABILITY: INCOME INSECURITY: HOW HARD IS IT FOR YOU TO PAY FOR THE VERY BASICS LIKE FOOD, HOUSING, MEDICAL CARE, AND HEATING?: NOT VERY HARD

## 2024-05-28 SDOH — SOCIAL STABILITY: SOCIAL INSECURITY: DO YOU FEEL UNSAFE GOING BACK TO THE PLACE WHERE YOU ARE LIVING?: NO

## 2024-05-28 SDOH — SOCIAL STABILITY: SOCIAL INSECURITY: ARE YOU OR HAVE YOU BEEN THREATENED OR ABUSED PHYSICALLY, EMOTIONALLY, OR SEXUALLY BY ANYONE?: NO

## 2024-05-28 SDOH — SOCIAL STABILITY: SOCIAL INSECURITY: DO YOU FEEL ANYONE HAS EXPLOITED OR TAKEN ADVANTAGE OF YOU FINANCIALLY OR OF YOUR PERSONAL PROPERTY?: NO

## 2024-05-28 SDOH — SOCIAL STABILITY: SOCIAL INSECURITY: WERE YOU ABLE TO COMPLETE ALL THE BEHAVIORAL HEALTH SCREENINGS?: YES

## 2024-05-28 SDOH — ECONOMIC STABILITY: HOUSING INSECURITY: IN THE LAST 12 MONTHS, HOW MANY PLACES HAVE YOU LIVED?: 1

## 2024-05-28 SDOH — SOCIAL STABILITY: SOCIAL INSECURITY: DOES ANYONE TRY TO KEEP YOU FROM HAVING/CONTACTING OTHER FRIENDS OR DOING THINGS OUTSIDE YOUR HOME?: NO

## 2024-05-28 SDOH — SOCIAL STABILITY: SOCIAL INSECURITY: ABUSE: ADULT

## 2024-05-28 SDOH — SOCIAL STABILITY: SOCIAL INSECURITY: HAS ANYONE EVER THREATENED TO HURT YOUR FAMILY OR YOUR PETS?: NO

## 2024-05-28 SDOH — SOCIAL STABILITY: SOCIAL INSECURITY: HAVE YOU HAD THOUGHTS OF HARMING ANYONE ELSE?: NO

## 2024-05-28 ASSESSMENT — ENCOUNTER SYMPTOMS
SORE THROAT: 1
SHORTNESS OF BREATH: 0
CHILLS: 0
NUMBNESS: 0
ABDOMINAL PAIN: 0
HEADACHES: 0
VOMITING: 0
LEG SWELLING: 0
FEVER: 0
LIGHT-HEADEDNESS: 0
CONSTIPATION: 1
COUGH: 0
ARTHRALGIAS: 0
TROUBLE SWALLOWING: 1
NAUSEA: 0

## 2024-05-28 ASSESSMENT — LIFESTYLE VARIABLES
HOW OFTEN DO YOU HAVE 6 OR MORE DRINKS ON ONE OCCASION: NEVER
AUDIT-C TOTAL SCORE: 0
SKIP TO QUESTIONS 9-10: 1
HOW MANY STANDARD DRINKS CONTAINING ALCOHOL DO YOU HAVE ON A TYPICAL DAY: PATIENT DOES NOT DRINK
AUDIT-C TOTAL SCORE: 0
HOW OFTEN DO YOU HAVE A DRINK CONTAINING ALCOHOL: NEVER

## 2024-05-28 ASSESSMENT — COGNITIVE AND FUNCTIONAL STATUS - GENERAL
DAILY ACTIVITIY SCORE: 24
MOBILITY SCORE: 24
PATIENT BASELINE BEDBOUND: NO

## 2024-05-28 ASSESSMENT — ACTIVITIES OF DAILY LIVING (ADL)
PATIENT'S MEMORY ADEQUATE TO SAFELY COMPLETE DAILY ACTIVITIES?: YES
FEEDING YOURSELF: INDEPENDENT
TOILETING: INDEPENDENT
WALKS IN HOME: INDEPENDENT
DRESSING YOURSELF: INDEPENDENT
ADEQUATE_TO_COMPLETE_ADL: YES
BATHING: INDEPENDENT
GROOMING: INDEPENDENT
HEARING - RIGHT EAR: FUNCTIONAL
JUDGMENT_ADEQUATE_SAFELY_COMPLETE_DAILY_ACTIVITIES: YES
HEARING - LEFT EAR: FUNCTIONAL

## 2024-05-28 ASSESSMENT — PATIENT HEALTH QUESTIONNAIRE - PHQ9
2. FEELING DOWN, DEPRESSED OR HOPELESS: NOT AT ALL
SUM OF ALL RESPONSES TO PHQ9 QUESTIONS 1 & 2: 0
1. LITTLE INTEREST OR PLEASURE IN DOING THINGS: NOT AT ALL

## 2024-05-28 ASSESSMENT — PAIN - FUNCTIONAL ASSESSMENT: PAIN_FUNCTIONAL_ASSESSMENT: 0-10

## 2024-05-28 ASSESSMENT — PAIN SCALES - GENERAL: PAINLEVEL_OUTOF10: 0 - NO PAIN

## 2024-05-28 NOTE — DISCHARGE INSTRUCTIONS
Dear Pilo Hanks,    You were admitted to [facility] 5/27 with reports of one month of worsening oral dysphagia (difficulty swallowing) and odynophagia (pain with swallowing). These symptoms are likely secondary to radiation and there may be a component of candidal infection which will be treated with Oral antibiotics. We will manage the throat pain and and damage with supportive agents listed below. We placed a nasal-to-stomach feeding tube and started liquid nutrition with the support of our inpatient dietitians to support you until your throat heals. We have discharged you home with home care to support you with tube feeds and the necessary supplies.     Medication changes:  Started Pantoprazole (For throat healing/pain)  Started Sucralfate (For throat healing/pain)  Started Fluconazole (For throat healing/pain)  Started Robinul (for secretions)  Started Pseudoephedrine (for secretions)   Continue taking Zofran  Can take over the counter Tylenol for sinus pressure (will likely improve as secretions decrease)    Appointments/follow-up:  Follow up with Dr. Burkitt and Dr. Blackman (Oncology and Radiation Onc respectively).      You will be called by the scheduling service to set up these appointment if not already in place. If you do not hear from them within 3 days, please call [phone] to schedule the appointment.    Please keep up-to-date with all vaccinations and cancer screenings.  Take all medications as prescribed and keep all appointments.    Thank you for allowing us to care for you,   Care Team

## 2024-05-28 NOTE — PROGRESS NOTES
Patient ID: Pilo Hanks is a 39 y.o. male.  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 Phi Cruz  MedOnc:  Dr. Kyunghee Burkitt/Shaila Fitzgerald PA-C  RadOnc: Dr. Raquel Blackman    Current Therapy  4/16/24:     Sites of Disease  Left BOT  B/L cervical LNs    Oncologic Issues    ONCOLOGIC HISTORY  - 02/2024: Presented to PCP with mass in left tonsillar area. Referred to ENT  - 3/15/24: In office biopsy of left BOT w/ Dr Cruz.  Path showed p16 positive SCC  - 3/13/29/24: CT neck and chest showed a soft tissue mass within the left oropharynx centered in the anterior tonsillar pillar/left base of tongue, measures approximately 1.8 x 1.4cm.  - 4/1/24: PET/CT showed multiple hypermetabolic bilateral multilevel cervical lymphadenopathy, largest of which located at left cervical level 2, ~1.2cm.  - 4/5/24 [facility]: Rec surgical resection vs chemoRT vs clinical trials , [id number]    Admission  5/27 - 6/1 for worsening dysphagia and odynophagia with likely candidal esophagitis. NG was placed and pt cleared for soft solids and mildly thickened fluids.     Past Medical History:   Past Medical History:  01/21/2016: Acute tonsillitis, unspecified      Comment:  Acute tonsillitis, unspecified etiology  01/11/2017: Hypertrophy of tonsils      Comment:  Lingual tonsil hypertrophy  01/05/2017: Hypertrophy of tonsils      Comment:  Tonsillar hypertrophy  No date: Other specified health status      Comment:  No pertinent past medical history  09/14/2021: Pain in right toe(s)      Comment:  Great toe pain, right  09/15/2021: Pain in unspecified ankle and joints of unspecified foot      Comment:  Ankle pain  07/27/2021: Personal history of other diseases of the nervous system   and sense organs      Comment:  History of obstructive sleep apnea  07/27/2021: Personal history of other diseases of the respiratory   system      Comment:   History of acute pharyngitis  2016: Personal history of other diseases of the respiratory   system      Comment:  History of sore throat  2017: Personal history of other diseases of the respiratory   system      Comment:  History of airway obstruction  2021: Personal history of other specified conditions      Comment:  History of chest pain  2021: Sprain of joints and ligaments of unspecified parts of   neck, initial encounter      Comment:  Neck sprain  2017: Sprain of unspecified ligament of right ankle, initial   encounter      Comment:  Sprain of right ankle, unspecified ligament, initial                encounter   Surgical History:    Past Surgical History:   Procedure Laterality Date    MOUTH SURGERY  2017    Oral Surgery Tooth Extraction    VASECTOMY Bilateral       Family History:    Family History   Problem Relation Name Age of Onset    No Known Problems Mother      No Known Problems Father      Diabetes Paternal Grandfather      Heart failure Paternal Grandfather      Testicular cancer Other Maternal Cousin 40 - 49    Prostate cancer Other Paternal uncle      Family Oncology History:    Cancer-related family history includes Prostate cancer in an other family member; Testicular cancer (age of onset: 40 - 49) in an other family member.  Social History:    Social History     Tobacco Use    Smoking status: Former     Current packs/day: 0.00     Average packs/day: 0.3 packs/day for 15.0 years (3.8 ttl pk-yrs)     Types: Cigarettes     Start date:      Quit date:      Years since quittin.4    Smokeless tobacco: Never   Vaping Use    Vaping status: Never Used   Substance Use Topics    Alcohol use: Not Currently    Drug use: Yes     Types: Marijuana     Comment: Edibles          Subjective   Chief Complaint: Squamous Cell Carcinoma of left base of tongue    RUFINO Hanks is a 39 y.o. male with recent diagnosis of T1N2M0 P16+ Squamous Cell Carcinoma of left  ANA LAURA. Who is completed chemoRT w/ 2 cycles HD Cis and 70gy RT on 5/28/24.    Interval History  Patient presents for 1 week follow up.    Patient was admitted 5/27 - 6/1 for worsening dysphagia and odynophagia with likely candidal esophagitis. NG was placed and pt cleared for soft solids and mildly thickened fluids.     Today patient reports he's still not feeling well.   He's tired and has had weight loss since before admission. He's using NG for nutrition, however not getting enough fluids via NG.  He's trying to eat a little bit of soft food PO but still has pain with swallowing.   Mucous is still thick, taking Robinul with some improvement. He feels the mucous is causing more difficulty swallowing. He's asking about a suction machine.   Has some constipation, does have Miralax but is not using. Last BM 3 days ago.   He has had gout flare ups in the last week. Taking Allopurinol every day and took Colchicine, however did not help.    Denies tinnitus, denies hearing change.     ROS  Review of Systems   Constitutional:  Negative for chills and fever.   HENT:   Positive for mouth sores, sore throat, tinnitus and trouble swallowing. Negative for hearing loss, lump/mass and nosebleeds.    Respiratory:  Negative for cough and shortness of breath.    Cardiovascular:  Negative for chest pain and leg swelling.   Gastrointestinal:  Positive for constipation. Negative for abdominal pain, nausea and vomiting.   Musculoskeletal:  Negative for arthralgias.   Skin:  Negative for rash.   Neurological:  Negative for headaches, light-headedness and numbness.       Allergies  No Known Allergies     Medications  Current Outpatient Medications   Medication Instructions    allopurinol (ZYLOPRIM) 100 mg, oral, Daily    ascorbic acid (VITAMIN C) 1,000 mg, oral, Daily    baclofen (LIORESAL) 5 mg, oral, 3 times daily    colchicine 0.6 mg tablet Use 2 tbs at the sign of gout PO can repeat in 1 hr if still with pain X 1     diphenhydramine/Maalox/lidocaine (Magic Mouthwash) - Compounded - Outpatient Swish and swallow 10 mL by mouth 3 times a day as needed for Pain.    fluconazole (DIFLUCAN) 200 mg, oral, Daily    gabapentin (NEURONTIN) 800 mg, oral, 3 times daily    glycopyrrolate (ROBINUL) 2 mg, oral, 3 times daily    ondansetron (ZOFRAN) 8 mg, oral, Every 8 hours PRN    pantoprazole (PROTONIX) 40 mg, oral, Daily, Do not crush, chew, or split.    psyllium husk, aspartame, 3.4 gram/5.8 gram powder Take 1 packet by mouth once daily. Do not fill before June 1, 2024.    sucralfate (CARAFATE) 1 g, oral, Every 6 hours scheduled    Sudogest 30 mg, oral, Every 4 hours PRN        Objective   VS: /70 (BP Location: Right arm, Patient Position: Sitting, BP Cuff Size: Adult)   Pulse 88   Temp 36.9 °C (98.4 °F) (Temporal)   Resp 18   Wt 63.4 kg (139 lb 12.4 oz)   SpO2 99%   BMI 21.25 kg/m²   Weight: Daily Weight  06/06/24 : 63.4 kg (139 lb 12.4 oz)  06/06/24 : 63.4 kg (139 lb 12.4 oz)  05/28/24 : 61.6 kg (135 lb 12.9 oz)  05/24/24 : 67.9 kg (149 lb 11.2 oz)  05/21/24 : 63.1 kg (139 lb 1.8 oz)  05/20/24 : 68.8 kg (151 lb 11.2 oz)  05/17/24 : 68.2 kg (150 lb 5.7 oz)      Physical Exam  Constitutional:       Appearance: Normal appearance.   HENT:      Head: Normocephalic and atraumatic.      Mouth/Throat:      Lips: Pink.      Mouth: Mucous membranes are moist. Oral lesions present.      Tongue: No lesions.      Palate: No mass.      Comments: Mucositis in left posterior oropharynx  Eyes:      Extraocular Movements: Extraocular movements intact.      Pupils: Pupils are equal, round, and reactive to light.   Cardiovascular:      Rate and Rhythm: Normal rate and regular rhythm.      Pulses: Normal pulses.   Pulmonary:      Effort: Pulmonary effort is normal.      Breath sounds: Normal breath sounds.   Abdominal:      General: Bowel sounds are normal.   Musculoskeletal:         General: Normal range of motion.   Skin:     General: Skin is  warm.   Neurological:      General: No focal deficit present.      Mental Status: He is alert and oriented to person, place, and time.   Psychiatric:         Mood and Affect: Mood normal.         Diagnostic Results   Labs  Below labs are reviewed today.  Results from last 7 days   Lab Units 06/06/24  0833 06/01/24  0923   WBC AUTO x10*3/uL 3.6* 3.6*   HEMOGLOBIN g/dL 9.9* 10.3*   HEMATOCRIT % 29.7* 30.9*   PLATELETS AUTO x10*3/uL 204 204   NEUTROS ABS x10*3/uL 2.82 2.48   LYMPHS ABS AUTO x10*3/uL 0.29* 0.42*   MONOS ABS AUTO x10*3/uL 0.41 0.54   EOS ABS AUTO x10*3/uL 0.06 0.08   NEUTROS PCT AUTO % 78.0 69.5   LYMPHS PCT AUTO % 8.0 11.8   MONOS PCT AUTO % 11.4 15.1   EOS PCT AUTO % 1.7 2.2        Results from last 7 days   Lab Units 06/06/24  0833 06/01/24  0923   GLUCOSE mg/dL 96 109*   SODIUM mmol/L 137 136   POTASSIUM mmol/L 4.2 3.8   CHLORIDE mmol/L 98 98   CO2 mmol/L 30 29   BUN mg/dL 25* 25*   CREATININE mg/dL 1.47* 1.32*   EGFR mL/min/1.73m*2 61 70   CALCIUM mg/dL 9.2 8.8   MAGNESIUM mg/dL 1.89 1.89   PHOSPHORUS mg/dL  --  2.7   ALBUMIN g/dL 4.2 3.8   PROTEIN TOTAL g/dL 7.3  --    BILIRUBIN TOTAL mg/dL 0.3  --    ALK PHOS U/L 45  --    ALT U/L 15  --    AST U/L 16  --                      Images  3/29/24 CT neck w/ contrast:   IMPRESSION:  Ill-defined soft tissue prominence within the left oropharynx centered in the anterior tonsillar pillar/left base of tongue, may measure approximately 1.8 cm, and likely corresponds to lesion visualized on clinical exam. No involvement of the parapharyngeal  spaces or osseous involvement.  Soft tissue filling the vallecula is thought to represent lingual tonsil.  Bilateral prominent cervical lymph nodes, largest in left level 2A measuring 1.2 cm. No enlarged lymph nodes by size criteria.     3/29/24 CT chest w/o contrast:   IMPRESSION:  1. There is a 3 mm left lower lobe nodule and a 2-3 mm left upper lobe nodule.  2. No evidence for lymphadenopathy. Probable  "hepatic cysts as described.  3. Incidental note is made of residual thymic tissue within the anterior mediastinum.     4/1/24 PET CT:  IMPRESSION:  1. Ill-defined large focal hypermetabolic soft tissue lesion centered in the anterior tonsillar pillar/left base of tongue bulging into the oropharynx, consistent with biopsy-proven squamous cell carcinoma.  2. Hypermetabolic focus at right tongue base, which could be physiologic, although neoplastic disease can not be excluded.  3. Multiple hypermetabolic bilateral multilevel cervical lymphadenopathy, largest of which located at left cervical level 2, as detailed above, compatible with locoregional metastatic omer  disease.  4. No evidence of hypermetabolic distant metastatic disease.    Pathology  No results found for: \"KBXHX\", \"PATHREP\", \"INTERP\", \"ADD1\", \"ADD2\", \"ADD3\", \"CORRECTIONHX\", \"ASTUDIES\", \"ANCILLARY1\", \"ANCILLARY2\", \"FINALINTERP\", \"COMDX\"      Assessment/Plan   Mr. Hanks is 38 y/o male with recent diagnosis of T2N2M0, P16+  left base of tongue squamous cell carcinoma who started chemoRT w/ HD Cis on 4/16/2024.    # Stage II (T1N2M0) p16 positive left base of tongue squamous cell carcinoma   - PET/CT showed uptake in left base of tongue, measures approximately 1.8 x 1.4cm as well as multiple hypermetabolic bilateral multilevel cervical lymphadenopathy, largest of which located at left cervical level 2, ~1.2cm.  - GFR >90 (3/25/24 labs), no hearing deficits  - Discussed with patient about SOC (concurrent chemorad with high dose cisplatin) vs clinical trial (: High dose cisplatin vs weekly cis or [id number]: CRT followed by Nivolumab), patient might be interested in clinical trial, he will think about it over the weekend and let us know.  - 4/16/24: Patient started on chemoRT w/ HD Cisplatin. Feeling well, left tongue mass not subjectively increased in size. Mild dysphagia. Patient is deciding on 6 vs 7 weeks of RT and will get back to us. Patient is young " (40yo) and has 3 children ages 3, 9 and 16 at home. Will refer him to Chaya Augustine NP for young adult services.    - 4/25/24: post chemo he experienced hiccups, likely from dex, has few episodes of diarrhea but resolved.  Otherwise, tolerated chemo well.  Cre 1.48 GFR 61 (baseline 73-83), improved from 4/19 labs, but will give 1L hydration today.  - 5/7/24: Patient tolerated C1 well but had some decrease in renal function, and hiccups that were not resolved with Baclofen. He had very good response so far as his left BOT mass is no longer visit. He does have mucositis in this area and has increasing odynophagia. Continue current pain regimen, continue Boost VHC x2/day.  - 6/6/24: Admitted 5/27 - 6/1 for worsening dysphagia and odynophagia with likely candidal esophagitis. NG was placed and pt cleared for soft solids and mildly thickened fluids. He still has mucositis evident is oropharynx w/odynophagia and dysphagia, taking Gabapentin  800mg TID.     # DELON  - During admission, Cr around 1.4.   - 6/6/: Cr 1.47, GFR 61. 1L NS given for hydration    # Constipation  - Not currently taking stool softeners or laxatives. Last BM 3 days ago, does not feel bloated currently  - Patient to start Miralax PRN     PLAN  -- 1L NS for hydration today. Encourage ample hydration via NG tube. Dietician consulted today for weight loss  -- Miralax PRN for constipation  -- RTC 3 weeks w/ MedOnc, labs cbc, cmp, mag on 6/27  -- 8/29/24 [facility] PET/CT and FUV w/ RadOnc  -- 11/19 FUV w/ NP Fawn   -- Continue Gabapentin 800mg TID, Oxycodone 5-10mg Q6hr PRN.   -- Continue ample oral hydration and hydration w/ NG tube

## 2024-05-28 NOTE — PROGRESS NOTES
Speech-Language Pathology  Adult Inpatient Clinical Bedside Swallow Evaluation    Patient Name: Pilo Hanks  MRN: [id number]  Today's Date: 5/28/2024   Start Time: 1400  Stop Time: 1421  Time Calculation (min): 21    History of Present Illness:   Pilo Hanks is a 39 y.o. male w/ Mhx of Squamous Cell Carcinoma of L oropharynx s/p Chemo/RadioTx c/b Gout presenting for inability to tolerate PO intake.      Per ED Provider, patient last Chemo (cisplatin) 3 weeks ago and completed RT last week.    Assessment:   Clinical bedside swallow evaluation completed.     Pt had FEES last week on 5/20 with the following results: Moderate oropharyngeal dysphagia secondary to edematous laryngeal and pharyngeal tissue.   Oral: Pt with reduced ability to masticate and manipulate solids in the oral cavity due to odynophagia and xerostomia. Thin liquid sips utilized to alleviate difficulty with ap propulsion with solids.   Pharyngeal: Pt with adequate white out duration followed by evidence of penetration and silent aspiration of trace amounts of thin liquids only during the swallow. Evidence consisted of trace scattered residues pooling on the true vocal folds, intra-arytenoid space and subglottic shelf. Penetrated materials did not clear with repeat swallow; placing pt at risk of aspiration over a course of a meal. Cued cough and re-swallow not effective. After the swallow; min to trace amount of pharyngeal stasis at the lateral channels and pyriforms which cleared with subsequent reflexive dry swallows.     Pt A&O x4 with functional oral musculature. Thick tenacious secretions evident within the oral cavity. Pt expelling secretions throughout evaluation. Pt consumed 2 very small sips of water with delay throat clearing and expectoration of secretions. Due to documented deficits with thin, other textures deferred at this time. Pt would benefit from a MBSS to further evaluate swallow function and to ascertain if pt's swallow is safe and  efficient to resume PO vs need for alternate means of nutrition/hydration.     Discussed results and recommendations with RN and MD.      Recommendations:  NPO    Frequent, aggressive oral care is strongly recommended to improve infection control as well as reduce dental plaque and bacteria on oropharyngeal surfaces which may increase the risk nosocomial infections, including pneumonia.    OK for small amounts of  sips of room tempeture water (one sip at a time, 10x/hour) for pleasure/swallow stimulation, but only after aggressive oral care to avoid colonization of bacteria within oral cavity.      Goal:   Pt. will tolerate least restrictive diet without overt s/s of aspiration with 100% accuracy.         Plan:  SLP Services Indicated: Yes  Frequency: x1/wk  Discussed POC with patient and spouse  SLP - OK to Discharge    Pain:   0-10  0 = No pain.     Inpatient Education:  Extensive education provided to patient and spouse regarding current swallow function, recommendations/results, and POC.      Consultations/Referrals/Coordination of Services:   N/A

## 2024-05-28 NOTE — PROGRESS NOTES
Pharmacy Medication History Review    Pilo Hanks is a 39 y.o. male admitted for Vomiting without nausea, unspecified vomiting type. Pharmacy reviewed the patient's [id number] medications and allergies for accuracy.    The list below reflects the updated PTA list. Comments regarding how patient may be taking medications differently can be found in the Admit Orders Activity  Prior to Admission Medications   Prescriptions Last Dose Informant   allopurinol (Zyloprim) 100 mg tablet 5/27/2024 Self   Sig: Take 1 tablet (100 mg) by mouth once daily.   ascorbic acid (Vitamin C) 1,000 mg tablet  Self   Sig: Take 1 tablet (1,000 mg) by mouth once daily.   baclofen (Lioresal) 5 mg tablet Not Taking    Sig: Take 1 tablet (5 mg) by mouth 3 times a day for 20 days.   Patient not taking: Reported on 5/28/2024   colchicine 0.6 mg tablet Past Week Self   Sig: Use 2 tbs at the sign of gout PO can repeat in 1 hr if still with pain X 1   gabapentin (Neurontin) 800 mg tablet Past Week Self   Sig: Take 1 tablet (800 mg) by mouth 3 times a day.   loperamide (Imodium) 2 mg capsule Not Taking Self   Sig: Take 1 capsule (2 mg) by mouth 4 times a day as needed for diarrhea. Initial: 4 mg, followed by 2 mg every 2 to 4 hours or after each loose stoo   Patient not taking: Reported on 5/28/2024   magnesium oxide (Mag-Ox) 400 mg (241.3 mg magnesium) tablet Not Taking Self   Sig: Take 1 tablet (400 mg) by mouth once daily.   Patient not taking: Reported on 5/28/2024   ondansetron (Zofran) 8 mg tablet 5/27/2024 Self   Sig: Take 1 tablet (8 mg) by mouth every 8 hours if needed for nausea or vomiting.   oxyCODONE (Roxicodone) 5 mg immediate release tablet 5/27/2024 Self   Sig: Take 1 tablet (5 mg) by mouth every 6 hours if needed for moderate pain (4 - 6) or severe pain (7 - 10).   prochlorperazine (Compazine) 10 mg tablet Not Taking Self   Sig: Take 1 tablet (10 mg) by mouth every 6 hours if needed for nausea or vomiting.   Patient not  taking: Reported on 5/28/2024   triamcinolone (Kenalog) 0.1 % cream Not Taking Self   Sig: Apply topically 2 times a day. Apply to affected area 1-2 times daily as needed.   Patient not taking: Reported on 5/28/2024      Facility-Administered Medications: None        The list below reflects the updated allergy list. Please review each documented allergy for additional clarification and justification.  Allergies  Reviewed by Roxy Boston on 5/28/2024   No Known Allergies         Medications ADDED:  None  Medications CHANGED:  None  Medications REMOVED:   None    Patient accepts M2B at discharge. Pharmacy has been updated to [facility].    Sources used to complete the med history include ;  - [place] ( shows recent dispense hx of Gabapentin 800 mg tablet dispensed on 05/06/24 for a 30 days supply with 90 tablets dispensed. Also shows recent dispense hx of oxyCODONE 5 mg immediate release tablet dispensed on 05/01/24 for a 30 days supply with 120 tablets dispensed.)   -Patient dispense hx  -Patient interview    Below are additional concerns with the patient's PTA list.  Per patient he is not taking the following ;  -Baclofen 5 mg tablet  -Ascorbic acid 1,000 mg tablet  -Loperamide 2 mg capsule  -Magnesium Oxide 400 mg tablet  -Prochlorperazine 10 mg tablet  -Triamcinolone 0.1% cream     Roxy Boston  Transitions of Care Pharmacy [facility]  Please reach out via Secure Chat for questions, or if no response call Communication Intelligence or [facility]

## 2024-05-28 NOTE — PROGRESS NOTES
"Subjective     Overnight events:  Pt was seen and examined at bedside. No acute overnight events. Denies any cardiopulmonary symptoms. Does endorse baseline throat soreness currently NPO prior to SLP evaluation. Does not endorse Nausea at baseline but reports gagging with attempted PO intake.    Vital signs:  Blood pressure 114/79, pulse 100, temperature 37.4 °C (99.3 °F), temperature source Temporal, resp. rate 18, height 1.727 m (5' 8\"), weight 63.5 kg (140 lb), SpO2 95%.    I/O last 3 completed shifts:  In: 999 (15.7 mL/kg) [IV Piggyback:999]  Out: - (0 mL/kg)   Weight: 63.5 kg     Physical exam:  Physical Exam  Constitutional:       General: He is not in acute distress.     Appearance: He is not ill-appearing.   HENT:      Head:      Comments: Several small white lesions in clusters in the mouth.      Mouth/Throat:      Mouth: Mucous membranes are moist.   Eyes:      General: No scleral icterus.     Extraocular Movements: Extraocular movements intact.   Neck:      Comments: Visible skin hyperpigmentation of the neck. (Pt noted that it presented following start of radiation).  Cardiovascular:      Rate and Rhythm: Normal rate and regular rhythm.      Pulses: Normal pulses.      Heart sounds: Normal heart sounds.   Pulmonary:      Effort: Pulmonary effort is normal.      Breath sounds: Normal breath sounds. No wheezing, rhonchi or rales.   Abdominal:      General: Abdomen is flat. Bowel sounds are normal. There is no distension.      Palpations: Abdomen is soft.      Tenderness: There is no abdominal tenderness.   Musculoskeletal:         General: No swelling.   Skin:     General: Skin is warm and dry.      Coloration: Skin is not jaundiced.   Neurological:      General: No focal deficit present.      Mental Status: He is oriented to person, place, and time.       Constitutional: Well-developed male in no acute distress.  HEENT: Normocephalic, atraumatic. PERRL. EOMI. No cervical lymphadenopathy.  Respiratory: CTA " bilaterally. No wheezes, rales, or rhonchi. Normal respiratory effort.  Cardiovascular: RRR. No murmurs, gallops, or rubs. No JVD. Radial pulses 2+.  Abdominal: Soft, nondistended, nontender to palpation. Bowel sounds present. No hepatosplenomegaly or masses. No CVA tenderness.  Neuro: CN II-XII intact. UE and LE strength 5/5 bilaterally and sensation intact.   MSK: No LE edema bilaterally.  Skin: Warm, dry. No rashes or wounds.  Psych: Appropriate mood and affect.    Relevant Results        Labs:  Last CBC:  Lab Results   Component Value Date    WBC 2.2 (L) 05/27/2024    HGB 11.5 (L) 05/27/2024    HCT 32.7 (L) 05/27/2024    MCV 83 05/27/2024     05/27/2024       Last RFP:  Lab Results   Component Value Date    GLUCOSE 88 05/27/2024    CALCIUM 9.9 05/27/2024     05/27/2024    K 4.8 05/27/2024    CO2 22 05/27/2024    CL 95 (L) 05/27/2024    BUN 26 (H) 05/27/2024    CREATININE 1.52 (H) 05/27/2024       Last LFTs:  Lab Results   Component Value Date    ALT 7 (L) 05/27/2024    AST 11 05/27/2024    ALKPHOS 59 05/27/2024    BILITOT 0.4 05/27/2024       Last coags:  Lab Results   Component Value Date    INR 1.3 (H) 05/27/2024    APTT 29 05/27/2024       Micro/culture data:  No results found for the last 90 days.      Imaging:  XR chest 1 view  Narrative: Interpreted By:  Iam Richards and Baker Zachary   STUDY:  XR CHEST 1 VIEW; ;  5/27/2024 4:29 pm      INDICATION:  Signs/Symptoms:Increased phlegm, immunocompromised.      COMPARISON:  CT chest on 03/29/2021.      ACCESSION NUMBER(S):  [id number]      ORDERING CLINICIAN:  GLENDA BAEZ      FINDINGS:  Single AP view of the chest.      The cardiomediastinal silhouette is normal in size and configuration.      No consolidation, pleural effusion, or pneumothorax.      No acute osseous abnormality.      Impression: No acute cardiopulmonary process.      I personally reviewed the images/study and I agree with the findings  as stated by Dr. Guero Staton M.D.  This study was interpreted at  [facility], [place], [place].      MACRO:  None      Signed by: Iam Richards 5/27/2024 6:01 PM  Dictation workstation:   [id number]  ECG 12 lead  Sinus rhythm with short WA  Right atrial enlargement  Borderline ECG  When compared with ECG of 25-MAR-2024 14:04,  No significant change was found  See ED provider note for full interpretation and clinical correlation  Confirmed by Vera Huang ([id number]) on 5/27/2024 2:14:14 PM         Assessment/Plan   Principal Problem:    Vomiting without nausea, unspecified vomiting type    Pilo Hanks is a 39 y.o. 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 He reports one month of worsening oral dysphagia. Patient swallowing could be functional, anatomic, neoplastic or neuromuscular. Likely a complication of recent radiation therapy. Primary Oncologist is Dr. K. Burkitt; Primary Rad Oncologist Dr. TRISHA Blackman.    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 Burkitt, and Dr. Blackman both aware of admission and presentation.  - Will consider temporary nutritional supplement options given completion of radiation. (Dobb conner > TPN >>> PEG final resort).    #Oral Candida  #C/f Candidal Esophagitis  - Magic Mouthwash  - Oral Fluconazole 200 daily (5/28- )  (for 14-21 days depending on response to therapy)  - PPI  - Sucralfate    #DELON/CKD   Estimated Creatinine Clearance: 58 mL/min (A) (by C-G formula  based on SCr of 1.52 mg/dL (H)). Baseline ~ 1.2-1.3  S/p 1L LR in ED, will continue w/ Maintenance Fluids 125cc/hr x16 hours LR  - DELON likely prerenal in etiology  - Will follow up UA    #Anemia  Likely 2/2 to chemotherapy on chronological review   - Retic 1.5% (18.4% immature)    #Gout  - c/w home Allopurinol 100mg daily, colchicine 1.2mg Q12H PRN (DELON likely 2/2 to prerenal etiology/hypovolemic dehydration; will monitor response to IVF and consider d'cing Colchicine as appropriate)      F: Replete PRN  E: Replete PRN  N: Regular Diet (NPO pending speech eval)  DVT Ppx: Heparin SubQ   GI Ppx: Pantoprazole    Access/Lines: PIV   Mane None  Abx: None  O2: None    Pain regimen: Tylenol / ASA / Oxy  GI Laxative: None / Docusate/Senna / Miralax     Code status: Full Code  Emergency contact: Wife [phone]       Nilton Garcia MD  PGY-1 Internal Medicine

## 2024-05-28 NOTE — H&P
"History Of Present Illness  Pilo Hanks is a 39 y.o. male w/ Mhx of Squamous Cell Carcinoma of L oropharynx s/p Chemo/RadioTx c/b Gout presenting for inability to tolerate PO intake.     Per ED Provider, patient last Chemo (cisplatin) 3 weeks ago and completed RT last week.    \"Since radiation, ongoing nausea/vomiting and spitting up mucous. Has been unable to eat or drink. Also reports gout flare L great toe. Took colchicine yesterday without relief.     Per patient, is not on steroids with radiation due to bad reaction to steroids the first time he had them with radiation.      SLP note 5/20 with silent aspiration thin liquids. Recommended mildly thick liquids and to continue solids.\"    ED Course:  Vitals HDS, Afebrile  Labs notable for  elevated BUN/Cr (26/1.52), WBC 2.2, Hgb 11.5  Imaging notable for unremarkable CXR  Interventions notable for Toradol 15mg IVP x1, LR 1L bolus, Magic Mouthwash and Zofran 4mg IVP x1    On my interview, patient endorses the above history; states he has not been able to eat or drink anything without vomiting it up. Denies fevers, chills, nausea, vomiting, chest pain, shortness of breath, abd pain, weakness, numbness, tingling, diarrhea, constipation.    PMH - acute tonsillitis recurrent, DAMON, multiple sprained joints  PSxH: wisdom teeth extraction, vasectomy 2023  Allergies: NKDA  Meds: As in EMR     Social History (per chart review): Grew up in [place], has one sister. Now lives in [place] with his wife and three children José Miguel (16), Domingo (9), and Cathy (4). 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 Prior tobacco 0.3packs/day x 15 years (3.8 ttl pack years) cigarettes [id number], does drink ETOH, wine 1-3 drinks/week, daily marijuana edibles. Does not vape or use other recreational drugs.    ONCOLOGIC HISTORY  - 02/2024: Presented to PCP with mass in left tonsillar area. Referred to ENT  - 3/15/24: In office biopsy of left BOT w/ Dr Cruz.  Path " showed p16 positive SCC  - 3/13/29/24: CT neck and chest showed a soft tissue mass within the left oropharynx centered in the anterior tonsillar pillar/left base of tongue, measures approximately 1.8 x 1.4cm.  - 4/1/24: PET/CT showed multiple hypermetabolic bilateral multilevel cervical lymphadenopathy, largest of which located at left cervical level 2, ~1.2cm.  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  - GFR >90 (3/25/24 labs), no hearing deficits  - Discussed with patient about SOC (concurrent chemorad with high dose cisplatin) vs clinical trial (: High dose cisplatin vs weekly cis or [id number]: CRT followed by Nivolumab), patient might be interested in clinical trial, he will think about it over the weekend and let us know.  - 4/16/24: Patient started on chemoRT w/ HD Cisplatin. Feeling well, left tongue mass not subjectively increased in size. Mild dysphagia. Patient is deciding on 6 vs 7 weeks of RT and will get back to us. Patient is young (40yo) and has 3 children ages 3, 9 and 16 at home. Will refer him to Chaya Augustine NP for young adult services.    - 4/25/24: post chemo he experienced hiccups, likely from dex, has few episodes of diarrhea but resolved.  Otherwise, tolerated chemo well.  Cre 1.48 GFR 61 (baseline 73-83), improved from 4/19 labs, but will give 1L hydration today.  - 5/7/24: Patient tolerated C1 well but had some decrease in renal function, and hiccups that were not resolved with Baclofen. He had very good response so far as his left BOT mass is no longer visit. He does have mucositis in this area and has  increasing odynophagia. Continue current pain regimen, continue Boost VHC x2/day.  -5/15/24: Post C2, experienced n/v, but improved with zofran, today he is feeling better, will give 1 hydration as his Cre worsened.  Mg is low at 1.49.    Past Medical History  Past Medical History:   Diagnosis Date    Acute tonsillitis, unspecified 01/21/2016    Acute tonsillitis, unspecified etiology    Hypertrophy of tonsils 01/11/2017    Lingual tonsil hypertrophy    Hypertrophy of tonsils 01/05/2017    Tonsillar hypertrophy    Other specified health status     No pertinent past medical history    Pain in right toe(s) 09/14/2021    Great toe pain, right    Pain in unspecified ankle and joints of unspecified foot 09/15/2021    Ankle pain    Personal history of other diseases of the nervous system and sense organs 07/27/2021    History of obstructive sleep apnea    Personal history of other diseases of the respiratory system 07/27/2021    History of acute pharyngitis    Personal history of other diseases of the respiratory system 01/21/2016    History of sore throat    Personal history of other diseases of the respiratory system 01/05/2017    History of airway obstruction    Personal history of other specified conditions 07/27/2021    History of chest pain    Sprain of joints and ligaments of unspecified parts of neck, initial encounter 08/21/2021    Neck sprain    Sprain of unspecified ligament of right ankle, initial encounter 03/26/2017    Sprain of right ankle, unspecified ligament, initial encounter       Surgical History  Past Surgical History:   Procedure Laterality Date    MOUTH SURGERY  01/11/2017    Oral Surgery Tooth Extraction    VASECTOMY Bilateral 2023        Social History  He reports that he quit smoking about 16 months ago. His smoking use included cigarettes. He started smoking about 16 years ago. He has a 3.8 pack-year smoking history. He has never used smokeless tobacco. He reports that he does not currently use  "alcohol. He reports current drug use. Drug: Marijuana.    Family History  Family History   Problem Relation Name Age of Onset    No Known Problems Mother      No Known Problems Father      Diabetes Paternal Grandfather      Heart failure Paternal Grandfather      Testicular cancer Other Maternal Cousin 40 - 49    Prostate cancer Other Paternal uncle         Allergies  Patient has no known allergies.    Review of Systems  12 point ROS reviewed and otherwise negative except as stated above.    Physical Exam  General: awake, alert, conversant, NAD  Skin: visible skin discoloration around neck, darker than rest of skin (patient endorses onset w/ radiation therapy)  HENT: NCAT  Eyes: EOMI, no scleral icterus or conjunctival pallor  Cardiac: RRR  Pulm: normal respiratory effort, no inc WOB  Abdomen: soft, ND, NT, no involuntary guarding or rebound tenderness  EXT: no peripheral edema, no asymmetry noted  MSK: no focal joint swelling noted, sensation and strength intact 5/5 in all extremities b/l  Neuro: AOx3, able to follow commands, no gross FND  Psych: coherent thought process, appropriate mood and affect    Last Recorded Vitals  Blood pressure 140/79, pulse 88, temperature 37.4 °C (99.3 °F), temperature source Temporal, resp. rate 16, height 1.727 m (5' 8\"), weight 63.5 kg (140 lb), SpO2 98%.    Relevant Results  Results for orders placed or performed during the hospital encounter of 05/27/24 (from the past 24 hour(s))   ECG 12 lead   Result Value Ref Range    Ventricular Rate 88 BPM    Atrial Rate 88 BPM    NM Interval 96 ms    QRS Duration 82 ms    QT Interval 328 ms    QTC Calculation(Bazett) 396 ms    P Axis 83 degrees    R Axis 83 degrees    T Axis 49 degrees    QRS Count 15 beats    Q Onset 223 ms    P Onset 175 ms    P Offset 219 ms    T Offset 387 ms    QTC Fredericia 372 ms   CBC and Auto Differential   Result Value Ref Range    WBC 2.2 (L) 4.4 - 11.3 x10*3/uL    nRBC 0.0 0.0 - 0.0 /100 WBCs    RBC 3.94 (L) " 4.50 - 5.90 x10*6/uL    Hemoglobin 11.5 (L) 13.5 - 17.5 g/dL    Hematocrit 32.7 (L) 41.0 - 52.0 %    MCV 83 80 - 100 fL    MCH 29.2 26.0 - 34.0 pg    MCHC 35.2 32.0 - 36.0 g/dL    RDW 12.6 11.5 - 14.5 %    Platelets 322 150 - 450 x10*3/uL    Neutrophils % 52.5 40.0 - 80.0 %    Immature Granulocytes %, Automated 0.9 0.0 - 0.9 %    Lymphocytes % 13.9 13.0 - 44.0 %    Monocytes % 31.4 2.0 - 10.0 %    Eosinophils % 0.4 0.0 - 6.0 %    Basophils % 0.9 0.0 - 2.0 %    Neutrophils Absolute 1.17 (L) 1.20 - 7.70 x10*3/uL    Immature Granulocytes Absolute, Automated 0.02 0.00 - 0.70 x10*3/uL    Lymphocytes Absolute 0.31 (L) 1.20 - 4.80 x10*3/uL    Monocytes Absolute 0.70 0.10 - 1.00 x10*3/uL    Eosinophils Absolute 0.01 0.00 - 0.70 x10*3/uL    Basophils Absolute 0.02 0.00 - 0.10 x10*3/uL   Comprehensive metabolic panel   Result Value Ref Range    Glucose 88 74 - 99 mg/dL    Sodium 136 136 - 145 mmol/L    Potassium 4.8 3.5 - 5.3 mmol/L    Chloride 95 (L) 98 - 107 mmol/L    Bicarbonate 22 21 - 32 mmol/L    Anion Gap 24 (H) 10 - 20 mmol/L    Urea Nitrogen 26 (H) 6 - 23 mg/dL    Creatinine 1.52 (H) 0.50 - 1.30 mg/dL    eGFR 59 (L) >60 mL/min/1.73m*2    Calcium 9.9 8.6 - 10.6 mg/dL    Albumin 4.5 3.4 - 5.0 g/dL    Alkaline Phosphatase 59 33 - 120 U/L    Total Protein 8.1 6.4 - 8.2 g/dL    AST 11 9 - 39 U/L    Bilirubin, Total 0.4 0.0 - 1.2 mg/dL    ALT 7 (L) 10 - 52 U/L   Magnesium   Result Value Ref Range    Magnesium 1.83 1.60 - 2.40 mg/dL   Troponin I, High Sensitivity   Result Value Ref Range    Troponin I, High Sensitivity 8 0 - 53 ng/L   APTT   Result Value Ref Range    aPTT 29 27 - 38 seconds   Protime-INR   Result Value Ref Range    Protime 14.6 (H) 9.8 - 12.8 seconds    INR 1.3 (H) 0.9 - 1.1        Assessment/Plan   Principal Problem:    Vomiting without nausea, unspecified vomiting type  Pilo Hanks is a 39 y.o. male w/ Mhx of Squamous Cell Carcinoma of L oropharynx s/p Chemo/RadioTx c/b Gout presenting for inability to  tolerate PO intake.     Patient swallowing could be functional, anatomic, neoplastic or neuromuscular. Could also be complication of recent radiation therapy. Will pursue SLP consult in AM and Oncology c/s (patient had outpatient appointment in AM, but will be inpatient).     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   #Cancer Related Pain/Neuropathy/Malnourishment  #Nausea/Vomiting/Inability to Tolerate PO Intake  #DELON  - c/w home Gabapentin 800mg TID   - c/w home oxycodone 5mg Q6H PRN mod to severe pain  - c/w Zofran/Compazine PRN Nausea/Vomiting  - c/w home Vit Ctablet daily, MagOx 400mg daily  - S/p 1L LR in ED, will continue w/ Maintenance Fluids 125cc/hr x16 hours LR  - DELON likely prerenal in etiology;but will obtain UA  [ ] C/s SLP in AM  [ ] C/s Oncology in AM    #Gout  - c/w home Allopurinol 100mg daily, colchicine 1.2mg Q12H PRN (DELON likely 2/2 to prerenal etiology/hypovolemic dehydration; will monitor response to IVF and consider d'cing Colchicine as appropriate)    F: LR  E: Replete PRN  N: Regular diet  A: pIV    DVT ppx: Lovenox  GI ppx: N/A    Full Code  SDM: Wife     Heaven Garcia  [facility] PGY3

## 2024-05-29 ENCOUNTER — APPOINTMENT (OUTPATIENT)
Dept: RADIATION ONCOLOGY | Facility: HOSPITAL | Age: 39
End: 2024-05-29
Payer: COMMERCIAL

## 2024-05-29 ENCOUNTER — APPOINTMENT (OUTPATIENT)
Dept: RADIOLOGY | Facility: HOSPITAL | Age: 39
DRG: 391 | End: 2024-05-29
Payer: COMMERCIAL

## 2024-05-29 LAB
ALBUMIN SERPL BCP-MCNC: 4.1 G/DL (ref 3.4–5)
ANION GAP SERPL CALC-SCNC: 21 MMOL/L (ref 10–20)
APPEARANCE UR: CLEAR
BASOPHILS # BLD AUTO: 0.03 X10*3/UL (ref 0–0.1)
BASOPHILS NFR BLD AUTO: 1.3 %
BILIRUB UR STRIP.AUTO-MCNC: ABNORMAL MG/DL
BUN SERPL-MCNC: 28 MG/DL (ref 6–23)
CALCIUM SERPL-MCNC: 9.4 MG/DL (ref 8.6–10.6)
CHLORIDE SERPL-SCNC: 99 MMOL/L (ref 98–107)
CO2 SERPL-SCNC: 27 MMOL/L (ref 21–32)
COLOR UR: YELLOW
CREAT SERPL-MCNC: 1.43 MG/DL (ref 0.5–1.3)
EGFRCR SERPLBLD CKD-EPI 2021: 64 ML/MIN/1.73M*2
EOSINOPHIL # BLD AUTO: 0.02 X10*3/UL (ref 0–0.7)
EOSINOPHIL NFR BLD AUTO: 0.9 %
ERYTHROCYTE [DISTWIDTH] IN BLOOD BY AUTOMATED COUNT: 13 % (ref 11.5–14.5)
GLUCOSE SERPL-MCNC: 85 MG/DL (ref 74–99)
GLUCOSE UR STRIP.AUTO-MCNC: NORMAL MG/DL
HCT VFR BLD AUTO: 31.4 % (ref 41–52)
HGB BLD-MCNC: 10.3 G/DL (ref 13.5–17.5)
HOLD SPECIMEN: NORMAL
IMM GRANULOCYTES # BLD AUTO: 0.01 X10*3/UL (ref 0–0.7)
IMM GRANULOCYTES NFR BLD AUTO: 0.4 % (ref 0–0.9)
KETONES UR STRIP.AUTO-MCNC: ABNORMAL MG/DL
LEUKOCYTE ESTERASE UR QL STRIP.AUTO: NEGATIVE
LYMPHOCYTES # BLD AUTO: 0.28 X10*3/UL (ref 1.2–4.8)
LYMPHOCYTES NFR BLD AUTO: 12.3 %
MAGNESIUM SERPL-MCNC: 1.83 MG/DL (ref 1.6–2.4)
MCH RBC QN AUTO: 28.8 PG (ref 26–34)
MCHC RBC AUTO-ENTMCNC: 32.8 G/DL (ref 32–36)
MCV RBC AUTO: 88 FL (ref 80–100)
MONOCYTES # BLD AUTO: 0.58 X10*3/UL (ref 0.1–1)
MONOCYTES NFR BLD AUTO: 25.6 %
MUCOUS THREADS #/AREA URNS AUTO: ABNORMAL /LPF
NEUTROPHILS # BLD AUTO: 1.35 X10*3/UL (ref 1.2–7.7)
NEUTROPHILS NFR BLD AUTO: 59.5 %
NITRITE UR QL STRIP.AUTO: NEGATIVE
NRBC BLD-RTO: 0 /100 WBCS (ref 0–0)
PH UR STRIP.AUTO: 6 [PH]
PHOSPHATE SERPL-MCNC: 5 MG/DL (ref 2.5–4.9)
PLATELET # BLD AUTO: 288 X10*3/UL (ref 150–450)
POTASSIUM SERPL-SCNC: 4 MMOL/L (ref 3.5–5.3)
PROT UR STRIP.AUTO-MCNC: ABNORMAL MG/DL
RBC # BLD AUTO: 3.58 X10*6/UL (ref 4.5–5.9)
RBC # UR STRIP.AUTO: ABNORMAL /UL
RBC #/AREA URNS AUTO: ABNORMAL /HPF
SODIUM SERPL-SCNC: 143 MMOL/L (ref 136–145)
SP GR UR STRIP.AUTO: 1.04
UROBILINOGEN UR STRIP.AUTO-MCNC: ABNORMAL MG/DL
WBC # BLD AUTO: 2.3 X10*3/UL (ref 4.4–11.3)
WBC #/AREA URNS AUTO: ABNORMAL /HPF

## 2024-05-29 PROCEDURE — 2500000004 HC RX 250 GENERAL PHARMACY W/ HCPCS (ALT 636 FOR OP/ED): Performed by: STUDENT IN AN ORGANIZED HEALTH CARE EDUCATION/TRAINING PROGRAM

## 2024-05-29 PROCEDURE — 92611 MOTION FLUOROSCOPY/SWALLOW: CPT | Mod: GN | Performed by: SPEECH-LANGUAGE PATHOLOGIST

## 2024-05-29 PROCEDURE — 92526 ORAL FUNCTION THERAPY: CPT | Mod: GN | Performed by: SPEECH-LANGUAGE PATHOLOGIST

## 2024-05-29 PROCEDURE — 85025 COMPLETE CBC W/AUTO DIFF WBC: CPT

## 2024-05-29 PROCEDURE — 74230 X-RAY XM SWLNG FUNCJ C+: CPT

## 2024-05-29 PROCEDURE — 74230 X-RAY XM SWLNG FUNCJ C+: CPT | Performed by: RADIOLOGY

## 2024-05-29 PROCEDURE — 1170000001 HC PRIVATE ONCOLOGY ROOM DAILY

## 2024-05-29 PROCEDURE — C9113 INJ PANTOPRAZOLE SODIUM, VIA: HCPCS | Performed by: STUDENT IN AN ORGANIZED HEALTH CARE EDUCATION/TRAINING PROGRAM

## 2024-05-29 PROCEDURE — 80069 RENAL FUNCTION PANEL: CPT

## 2024-05-29 PROCEDURE — 2500000005 HC RX 250 GENERAL PHARMACY W/O HCPCS: Performed by: STUDENT IN AN ORGANIZED HEALTH CARE EDUCATION/TRAINING PROGRAM

## 2024-05-29 PROCEDURE — 2500000001 HC RX 250 WO HCPCS SELF ADMINISTERED DRUGS (ALT 637 FOR MEDICARE OP): Performed by: STUDENT IN AN ORGANIZED HEALTH CARE EDUCATION/TRAINING PROGRAM

## 2024-05-29 PROCEDURE — 2500000004 HC RX 250 GENERAL PHARMACY W/ HCPCS (ALT 636 FOR OP/ED)

## 2024-05-29 PROCEDURE — 2500000001 HC RX 250 WO HCPCS SELF ADMINISTERED DRUGS (ALT 637 FOR MEDICARE OP)

## 2024-05-29 PROCEDURE — 2500000002 HC RX 250 W HCPCS SELF ADMINISTERED DRUGS (ALT 637 FOR MEDICARE OP, ALT 636 FOR OP/ED)

## 2024-05-29 PROCEDURE — 99221 1ST HOSP IP/OBS SF/LOW 40: CPT | Performed by: OTOLARYNGOLOGY

## 2024-05-29 PROCEDURE — 81001 URINALYSIS AUTO W/SCOPE: CPT | Performed by: STUDENT IN AN ORGANIZED HEALTH CARE EDUCATION/TRAINING PROGRAM

## 2024-05-29 PROCEDURE — A4217 STERILE WATER/SALINE, 500 ML: HCPCS

## 2024-05-29 PROCEDURE — 99232 SBSQ HOSP IP/OBS MODERATE 35: CPT

## 2024-05-29 PROCEDURE — 74018 RADEX ABDOMEN 1 VIEW: CPT

## 2024-05-29 PROCEDURE — 74018 RADEX ABDOMEN 1 VIEW: CPT | Performed by: RADIOLOGY

## 2024-05-29 PROCEDURE — 83735 ASSAY OF MAGNESIUM: CPT

## 2024-05-29 PROCEDURE — 31575 DIAGNOSTIC LARYNGOSCOPY: CPT | Performed by: OTOLARYNGOLOGY

## 2024-05-29 PROCEDURE — 36415 COLL VENOUS BLD VENIPUNCTURE: CPT

## 2024-05-29 PROCEDURE — 0DH68UZ INSERTION OF FEEDING DEVICE INTO STOMACH, VIA NATURAL OR ARTIFICIAL OPENING ENDOSCOPIC: ICD-10-PCS | Performed by: STUDENT IN AN ORGANIZED HEALTH CARE EDUCATION/TRAINING PROGRAM

## 2024-05-29 RX ORDER — BACLOFEN 10 MG/1
10 TABLET ORAL 3 TIMES DAILY
Status: DISCONTINUED | OUTPATIENT
Start: 2024-05-29 | End: 2024-05-30

## 2024-05-29 RX ORDER — ALLOPURINOL 100 MG/1
100 TABLET ORAL DAILY
Status: DISCONTINUED | OUTPATIENT
Start: 2024-05-30 | End: 2024-06-01 | Stop reason: HOSPADM

## 2024-05-29 RX ORDER — BACLOFEN 10 MG/1
10 TABLET ORAL 3 TIMES DAILY
Status: DISCONTINUED | OUTPATIENT
Start: 2024-05-29 | End: 2024-05-29

## 2024-05-29 RX ORDER — ASCORBIC ACID 500 MG
1000 TABLET ORAL DAILY
Status: DISCONTINUED | OUTPATIENT
Start: 2024-05-30 | End: 2024-06-01 | Stop reason: HOSPADM

## 2024-05-29 RX ORDER — SODIUM CHLORIDE 9 MG/ML
500 INJECTION, SOLUTION INTRAVENOUS CONTINUOUS
Status: ACTIVE | OUTPATIENT
Start: 2024-05-29 | End: 2024-05-29

## 2024-05-29 RX ORDER — GABAPENTIN 250 MG/5ML
800 SOLUTION ORAL 3 TIMES DAILY
Status: DISCONTINUED | OUTPATIENT
Start: 2024-05-29 | End: 2024-06-01 | Stop reason: HOSPADM

## 2024-05-29 RX ORDER — MAGNESIUM SULFATE HEPTAHYDRATE 40 MG/ML
2 INJECTION, SOLUTION INTRAVENOUS ONCE
Status: COMPLETED | OUTPATIENT
Start: 2024-05-29 | End: 2024-05-29

## 2024-05-29 RX ORDER — BACLOFEN 10 MG/1
10 TABLET ORAL 3 TIMES DAILY
Status: DISCONTINUED | OUTPATIENT
Start: 2024-05-30 | End: 2024-05-29

## 2024-05-29 RX ORDER — LANOLIN ALCOHOL/MO/W.PET/CERES
400 CREAM (GRAM) TOPICAL DAILY
Status: DISCONTINUED | OUTPATIENT
Start: 2024-05-30 | End: 2024-06-01 | Stop reason: HOSPADM

## 2024-05-29 RX ADMIN — SUCRALFATE 1 G: 1 SUSPENSION ORAL at 11:50

## 2024-05-29 RX ADMIN — ONDANSETRON 4 MG: 2 INJECTION INTRAMUSCULAR; INTRAVENOUS at 07:29

## 2024-05-29 RX ADMIN — PROCHLORPERAZINE EDISYLATE 10 MG: 5 INJECTION INTRAMUSCULAR; INTRAVENOUS at 14:40

## 2024-05-29 RX ADMIN — SODIUM CHLORIDE, POTASSIUM CHLORIDE, SODIUM LACTATE AND CALCIUM CHLORIDE 1000 ML: 600; 310; 30; 20 INJECTION, SOLUTION INTRAVENOUS at 09:28

## 2024-05-29 RX ADMIN — BACLOFEN 10 MG: 10 TABLET ORAL at 09:27

## 2024-05-29 RX ADMIN — MAGNESIUM SULFATE HEPTAHYDRATE 2 G: 40 INJECTION, SOLUTION INTRAVENOUS at 14:41

## 2024-05-29 RX ADMIN — SODIUM CHLORIDE 500 ML/HR: 9 INJECTION, SOLUTION INTRAVENOUS at 09:47

## 2024-05-29 RX ADMIN — ALLOPURINOL 100 MG: 100 TABLET ORAL at 09:27

## 2024-05-29 RX ADMIN — ONDANSETRON 4 MG: 2 INJECTION INTRAMUSCULAR; INTRAVENOUS at 22:04

## 2024-05-29 RX ADMIN — FLUCONAZOLE 200 MG: 40 POWDER, FOR SUSPENSION ORAL at 21:24

## 2024-05-29 RX ADMIN — BACLOFEN 10 MG: 10 TABLET ORAL at 14:40

## 2024-05-29 RX ADMIN — HYDROMORPHONE HYDROCHLORIDE 0.4 MG: 1 INJECTION, SOLUTION INTRAMUSCULAR; INTRAVENOUS; SUBCUTANEOUS at 18:56

## 2024-05-29 RX ADMIN — BARIUM SULFATE 5 ML: 400 SUSPENSION ORAL at 09:21

## 2024-05-29 RX ADMIN — BARIUM SULFATE 20 ML: 0.81 POWDER, FOR SUSPENSION ORAL at 09:21

## 2024-05-29 RX ADMIN — BARIUM SULFATE 15 ML: 400 PASTE ORAL at 09:22

## 2024-05-29 RX ADMIN — BARIUM SULFATE 15 ML: 400 SUSPENSION ORAL at 09:21

## 2024-05-29 RX ADMIN — PANTOPRAZOLE SODIUM 40 MG: 40 INJECTION, POWDER, FOR SOLUTION INTRAVENOUS at 09:27

## 2024-05-29 RX ADMIN — BACLOFEN 10 MG: 10 TABLET ORAL at 21:24

## 2024-05-29 RX ADMIN — SUCRALFATE 1 G: 1 SUSPENSION ORAL at 07:34

## 2024-05-29 ASSESSMENT — ACTIVITIES OF DAILY LIVING (ADL): LACK_OF_TRANSPORTATION: NO

## 2024-05-29 ASSESSMENT — COGNITIVE AND FUNCTIONAL STATUS - GENERAL
MOBILITY SCORE: 24
MOBILITY SCORE: 24
DAILY ACTIVITIY SCORE: 24
DAILY ACTIVITIY SCORE: 24

## 2024-05-29 ASSESSMENT — PAIN SCALES - GENERAL
PAINLEVEL_OUTOF10: 0 - NO PAIN
PAINLEVEL_OUTOF10: 0 - NO PAIN
PAINLEVEL_OUTOF10: 8
PAINLEVEL_OUTOF10: 0 - NO PAIN

## 2024-05-29 ASSESSMENT — PAIN - FUNCTIONAL ASSESSMENT
PAIN_FUNCTIONAL_ASSESSMENT: 0-10

## 2024-05-29 NOTE — CARE PLAN
Problem: Pain  Goal: My pain/discomfort is manageable  Outcome: Progressing     Problem: Safety  Goal: Patient will be injury free during hospitalization  Outcome: Progressing   The patient's goals for the shift include      The clinical goals for the shift include Pt will be able to eat and have no nausea    Pt is alert and oriented at bedside. Pt returned from modified barrium swallow test and new orders for diet was placed. Pt shows no s/s of distress on room air. No complaints of nausea at the moment. Bed in lowest position, and call light within reach. Will continue to monitor, assess, and update pt on plan of care.

## 2024-05-29 NOTE — PROGRESS NOTES
SW met with the patient to complete the discharge assessment. Patient very pleasant and stattes he lives at home with his wife and three children in a single family home. Patient shared that he is currently working full time and independent with his care. Pt.'s wife and sister are both RN's and he states he has a very good support system and a strong ricky. Patient states he has had N/V and difficulty swallowing and may need a dobhoff. Patient declined home care since he shared his wife ans sister will be available for assistance if needed. Will continue to follow for assistance with planning for patient's care at discharge.  OPAL Gongora  5/29/24@11:00am

## 2024-05-29 NOTE — PROCEDURES
Speech-Language Pathology  Adult Inpatient Modified Barium Swallow Study (MBSS)    Patient Name: Pilo Hanks  MRN: [id number]  Today's Date: 5/29/2024   Start Time: 845  Stop Time: 905  Time Calculation (min): 20    Initial MBSS: Yes    Respiratory Status:  R/A    History of Present Illness:   Pilo Hanks is a 39 y.o. male w/ Mhx of Squamous Cell Carcinoma of L oropharynx s/p Chemo/RadioTx c/b Gout presenting for inability to tolerate PO intake.      Per ED Provider, patient last Chemo (cisplatin) 3 weeks ago and completed RT last week.    Impression:   Modified Barium Swallow Study completed. Verbal consent obtained. Pt given thin, mildly thick, moderately thick, puree and solid textures. Min-Mild pharyngeal dysphagia secondary to affects of recent XRT.     Pt demonstrated functional oral manipulation and mastication of bolus. Hesitation evident to propel each bolus posteriorly due to anticipation of pain and difficulty swallowing.     Min-mild pharyngeal dysphagia marked by Min decrease BOT with trace post swallow residues across textures. Mistiming of laryngeal vestibular closure resulted in deep penetration of thin liquids. Pt immediately sensate resulting in cough clearing the laryngeal vestibule from penetration. No penetration or aspiration with mildly/moderately thick liquids, puree or solids.     Pt ready to return to modified diet with mildly thick liquids.   Concern for pt's ability to maintain adequate nutrition/hydration to meet his needs.      Aster:  Thin: 4 - Material enters airway, contacts the folds, ejected from airway.  [place]: 1 - Material does not enter airway.   Honey: 1 - Material does not enter airway.   Puree: 1 - Material does not enter airway.   Solids: 1 - Material does not enter airway.     Recommendations:  Soft diet with mildly thick liquids  Upright for all PO intake  Remain upright for 20-30 min after eating  Small bites/sips  Pills per pt preference    -Further recommend small  frequent meals as to not overwhelm pt as well as improve easy of swallow.   -Use of BMX prior to each meal to improved pain management.   -Free water protocol between meals.   Goal:   Pt. will tolerate least restrictive diet without overt s/s of aspiration with 100% accuracy.  Pt will utilized safe swallow strategies independently during meals.        Plan:  SLP Services Indicated: Yes  Frequency: x1/wk  Discussed POC with patient  SLP - OK to Discharge    Pain:   0-10  4 = Throat during swallow     Inpatient Education:  Extensive education provided to patient regarding current swallow function, recommendations/results, and POC.      Consultations/Referrals/Coordination of Services:   N/A

## 2024-05-29 NOTE — PROGRESS NOTES
"Subjective     Overnight events:  No acute overnight events. Denies any new symptoms. States throat discomfort has improved with minimal pain at rest but still has discomfort when swallowing. Still having persistent hiccups.    Vital signs:  Blood pressure 131/88, pulse 70, temperature 37.2 °C (99 °F), temperature source Temporal, resp. rate 16, height 1.727 m (5' 8\"), weight 61.6 kg (135 lb 12.9 oz), SpO2 97%.    I/O last 3 completed shifts:  In: 1000 (16.2 mL/kg) [I.V.:1000 (16.2 mL/kg)]  Out: - (0 mL/kg)   Weight: 61.6 kg     Physical exam:  Physical Exam  Constitutional:       General: He is not in acute distress.     Appearance: He is not ill-appearing.   HENT:      Head:      Comments: Several small white lesions in clusters in the mouth. Roof of mouth also white and easily scrapes away with pressure.     Mouth/Throat:      Mouth: Mucous membranes are moist.   Eyes:      General: No scleral icterus.     Extraocular Movements: Extraocular movements intact.   Neck:      Comments: Visible skin hyperpigmentation of the neck. (Pt noted that it presented following start of radiation).  Cardiovascular:      Rate and Rhythm: Normal rate and regular rhythm.      Pulses: Normal pulses.      Heart sounds: Normal heart sounds.   Pulmonary:      Effort: Pulmonary effort is normal.      Breath sounds: Normal breath sounds. No wheezing, rhonchi or rales.   Abdominal:      General: Abdomen is flat. Bowel sounds are normal. There is no distension.      Palpations: Abdomen is soft.      Tenderness: There is no abdominal tenderness.   Musculoskeletal:         General: No swelling.   Skin:     General: Skin is warm and dry.      Coloration: Skin is not jaundiced.   Neurological:      General: No focal deficit present.      Mental Status: He is oriented to person, place, and time.     Relevant Results        Labs:  Last CBC:  Lab Results   Component Value Date    WBC 2.3 (L) 05/29/2024    HGB 10.3 (L) 05/29/2024    HCT 31.4 (L) " 05/29/2024    MCV 88 05/29/2024     05/29/2024       Last RFP:  Lab Results   Component Value Date    GLUCOSE 85 05/29/2024    CALCIUM 9.4 05/29/2024     05/29/2024    K 4.0 05/29/2024    CO2 27 05/29/2024    CL 99 05/29/2024    BUN 28 (H) 05/29/2024    CREATININE 1.43 (H) 05/29/2024       Last LFTs:  Lab Results   Component Value Date    ALT 7 (L) 05/27/2024    AST 11 05/27/2024    ALKPHOS 59 05/27/2024    BILITOT 0.4 05/27/2024       Last coags:  Lab Results   Component Value Date    INR 1.3 (H) 05/27/2024    APTT 29 05/27/2024       Micro/culture data:  No results found for the last 90 days.      Imaging:  XR chest 1 view  Narrative: Interpreted By:  Iam Richards,  and Shemar Jack   STUDY:  XR CHEST 1 VIEW; ;  5/27/2024 4:29 pm      INDICATION:  Signs/Symptoms:Increased phlegm, immunocompromised.      COMPARISON:  CT chest on 03/29/2021.      ACCESSION NUMBER(S):  [id number]      ORDERING CLINICIAN:  GLENDA BAEZ      FINDINGS:  Single AP view of the chest.      The cardiomediastinal silhouette is normal in size and configuration.      No consolidation, pleural effusion, or pneumothorax.      No acute osseous abnormality.      Impression: No acute cardiopulmonary process.      I personally reviewed the images/study and I agree with the findings  as stated by Dr. Guero Staton M.D. This study was interpreted at  [place], [place].      MACRO:  None      Signed by: Iam Richards 5/27/2024 6:01 PM  Dictation workstation:   [id number]  ECG 12 lead  Sinus rhythm with short NJ  Right atrial enlargement  Borderline ECG  When compared with ECG of 25-MAR-2024 14:04,  No significant change was found  See ED provider note for full interpretation and clinical correlation  Confirmed by Vera Huang ([id number]) on 5/27/2024 2:14:14 PM         Assessment/Plan   Principal Problem:    Vomiting without nausea, unspecified vomiting type    Pilo Hanks is a 39 y.o. 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 He reports one month of worsening oral dysphagia. Patient swallowing could be functional, anatomic, neoplastic or neuromuscular. Likely a complication of recent radiation therapy. Primary Oncologist is Dr. K. Burkitt; Primary Rad Oncologist Dr. TRISHA Blackman.    Updates:  - MBS today; okay to resume PO intake; further recs per SLP   - Dobhoff today; Tube feeds per Nutrition recommendations    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   #Cancer Related Pain/Neuropathy/Malnourishment  #Nausea/Vomiting/Inability to Tolerate PO Intake (Concern for radiation esophagitis)  - c/w home Gabapentin 800mg TID   - c/w home oxycodone 5mg Q6H PRN mod to severe pain  - c/w Zofran/Compazine PRN Nausea/Vomiting  - c/w home Vit Ctablet daily, MagOx 400mg daily  - Consulted SLP; appreciate recs   - Consulted Nutrition; appreciate recs  - Dr. Burkitt, and Dr. Blackman both aware of admission and presentation.  - Will consider temporary nutritional supplement options given completion of radiation.  - Pt has elected to have Dobhoff placed to assist in maintaining nutritional needs as his throat recovers.    #Oral Candida  #C/f Candidal Esophagitis  - Magic Mouthwash  - Oral Fluconazole 200 daily (5/28- )  (for 14-21 days depending on response to therapy)  - Cont PPI  - Cont Sucralfate    #DELON/CKD   Estimated Creatinine Clearance: 59.8 mL/min (A) (by C-G formula based on SCr of 1.43 mg/dL (H)). Baseline ~ 1.2-1.3  S/p 1L LR in ED, will continue w/ Maintenance Fluids 125cc/hr x16 hours LR  - DELON likely prerenal in etiology  - Will follow up UA    #Anemia  Likely 2/2 to chemotherapy on chronological review   :: Retic 1.5% (18.4% immature) RI: Hypo-proliferative    - Will CTM    #Gout  - c/w home Allopurinol 100mg daily, colchicine 1.2mg Q12H PRN (DELON likely 2/2 to prerenal  etiology/hypovolemic dehydration; will monitor response to IVF and consider d'cing Colchicine as appropriate)      F: Replete PRN  E: Replete PRN  N: Regular Diet (Easy to chew/Thick Mild)  DVT Ppx: Heparin SubQ   GI Ppx: Pantoprazole    Access/Lines: PIV   Mane None  Abx: None  O2: None    Pain regimen: Tylenol   GI Laxative:  None    Code status: Full Code  Emergency contact: Fei Hanks (Wife) [phone]       Nilton Garcia MD  PGY-1 Internal Medicine

## 2024-05-29 NOTE — PROGRESS NOTES
Speech-Language Pathology  Adult Inpatient Swallow Treatment    Patient Name: Pilo Hanks  MRN: [id number]  Today's Date: 5/29/2024   Start Time: 1032  Stop Time: 1100  Time Calculation (min): 28    Impression:   Pt seen for dysphagia therapy. Reviewed safe swallow strategies and provided feedback to deal with coughing after sips of thin water in between meals. This included after cough to use slow breaths in between coughs realizing the body sensates the penetration and the cough is a reaction to it. Provided pt with list of appropriate mildly thick liquids to be purchased at the [employer] as well as information to order thickener to be used at home. Encourage pt to either graze or 6 small meals as sitting in front of one large meal can be overwhelming. The use of BMX prior to PO to aid in pain management.      Recommendations:  Soft diet with mildly thick liquids  Upright for all PO intake  Remain upright for 20-30 min after eating  Small bites/sips  Pills per pt preference     -Further recommend small frequent meals as to not overwhelm pt as well as improve easy of swallow.   -Use of BMX prior to each meal to improved pain management.   -Free water protocol between meals.   Goal:   Pt. will tolerate least restrictive diet without overt s/s of aspiration with 100% accuracy.  Pt will utilized safe swallow strategies independently during meals.         Plan:  SLP Services Indicated: Yes  Frequency: x1/wk  Discussed POC with patient  SLP - OK to Discharge    Pain:   0-10  0 = No pain.     Inpatient Education:  Extensive education provided to patient regarding current swallow function, recommendations/results, and POC.      Consultations/Referrals/Coordination of Services:   N/A

## 2024-05-29 NOTE — CONSULTS
CC/Reason for Consult: feeding tube consult     Consulted by: Gale    HPI: Patient 39-year-old male with past medical history significant for squamous cell carcinoma of the left oropharynx status post chemoradiation.  Patient initially establish care with Dr. Cruz on 3/15/2024, referred for chemoradiation by head and neck tumor board on 4/5/2024, as of 5/15, patient had completed 2 cycles of chemoradiation.  Unfortunately on 5/27, patient developed worsening a dyne aphasia and poor p.o. intake, with failure to thrive.  Patient seen by speech therapy for MBS, with recommendations for soft diet with mildly thick liquids with postural changes; however given poor p.o. tolerance, patient has elected to have feeding tube placed to assist in maintaining nutritional needs as throat recovers through radiation treatment.  Given oropharyngeal cancer, ENT consulted for assistance with feeding tube placement.    Past medical history: As above, gout    Past surgical history: As above    Social history:  Smoking: Former smoker, quit over a year ago  Alcohol: Denies  Independent at baseline    Family history:   Not relevant to the presenting complaint    Current medications: Reviewed as noted in current orders    Allergies: NKDA    ROS:  A full review of systems was obtained and all other systems are negative for complaint    Physical Exam:  CONSTITUTIONAL:  No acute distress, cachectic  VOICE:  No hoarseness or other abnormality  RESPIRATION:  Breathing comfortably, no stridor  CV:  No clubbing/cyanosis/edema in hands  EYES:  EOM intact, sclera normal  NEURO:  Alert and oriented times 3, Cranial nerves II-XII grossly intact and symmetric bilaterally  HEAD AND FACE:  Symmetric facial features  SALIVARY GLANDS:  Parotid and submandibular glands normal bilaterally  EARS:  Normal external ears, normal hearing to whispered voice.  NOSE:  External nose midline, anterior rhinoscopy is normal with limited visualization to the anterior  aspect of the interior turbinates, no bleeding or drainage, no lesions  ORAL CAVITY/OROPHARYNX/LIPS:  left base of tongue with exophytic granular mass ~ 1.5 cm in size.  PHARYNGEAL WALLS:  No masses or lesions  NECK/LYMPH:  No LAD, no thyroid masses, trachea midline  SKIN:  Neck skin is without scar or injury  PSYCH:  Alert and oriented with appropriate mood and affect    Procedure Note: Dobhoff Tube (DHT) placement  Verbal informed consent was obtained from the patient. Under direct visualization with a fiberoptic nasolaryngoscope,  a DHT was placed into the patient's right naris, advanced into the nasopharynx and, after flexion of the patients neck, the DHT tube was passed into the esophagus. Advancement of the tube continued to 55 cm. The tube was bridled to secure the DHT. The patient tolerated the procedure well.     Recommendation:  - Please obtain KUB to confirm placement before use for meds or feeding  - Rest of care per primary team   - Please page with any questions or concerns     Luiz Slade MD  Dept. of Otolaryngology - Head and Neck Surgery, PGY-2  ENT Adult: [id number]  ENT Peds: [id number]  ENT Outpatient scheduling number: [phone]

## 2024-05-29 NOTE — CONSULTS
"Nutrition Initial Assessment:   Nutrition Assessment    --->Reason for Assessment: Admission nursing screening    Patient is a 39 y.o. male with Squamous Cell Carcinoma of L oropharynx (last chemo was 3 weeks ago, completed RT last week), admitted d/t dysphagia + n/v.    Since admit, pt underwent MBSS this am, cleared for Soft Diet + mildly-thick liquids.     Nutrition History:  Reviewed EMR; pt being followed by outpatient nutrition services--last assessed on 05/03/24. At the time, pt with c/o dysgeusia, sore throat, and xerostomia. PO was declining at that time, focusing on soft/moister foods. Had recently started drinking Muscle Milk for added nutrition.    This writer met with pt earlier today, was sitting up in bed at time of visit with him.    Tells has had issues swallowing + intermittent n/v x at least the last 2 weeks, unable to eat much of anything during that time. Tells he used to drink oral nutrition supplements (Boost VHC, Muscle Milk) but has not over the last couple of weeks, \"since my last chemo treatment even the smell of something like that makes me gag.\"     Since admit, still with c/o intermittent n/v. Was able to eat a small amount of his cream of wheat and apple juice this am for breakfast meal, \"but it was a chore to try to eat,\" has no desire to eat. Did keep what he ate down without any issues with n/v. Denied diarrhea.    Pt mentioned the team had talked to him earlier about possible small-bore feeding tube placement, until swallowing issues resolve. Pt reports he is ready for feeding tube placement, as he feels he will not be able to eat enough anytime soon, \"to keep up with my nutrition.\" RDN did relay pt's decision to team + requested team go speak to pt again, when they have time, as pt reported he had additional questions for team--team agreeable.    --Vitamin/Herbal Supplement Use: none  --Food Allergies/Intolerances: none       Anthropometrics:  Height: 172.7 cm (5' 8\")   Weight: " 61.6 kg (135 lb 12.9 oz)   BMI (Calculated): 20.65  IBW/kg (Dietitian Calculated): 70 kg  Percent of IBW: 88 %       Weight History:     Wt Readings per review of EMR:   05/28/24 61.6 kg (135 lb 12.9 oz) (current wt/standing scale)   05/24/24 67.9 kg (149 lb 11.2 oz)   05/21/24 63.1 kg (139 lb 1.8 oz)--?   05/20/24 68.8 kg (151 lb 11.2 oz)--->10% wt loss from this date to present (significant)   05/17/24 68.2 kg (150 lb 5.7 oz)   05/16/24 70.4 kg (155 lb 4.8 oz)   05/15/24 68.3 kg (150 lb 9.6 oz)   05/13/24 70.2 kg (154 lb 12.2 oz)   05/10/24 72.1 kg (159 lb)   05/09/24 74.5 kg (164 lb 4.8 oz)   05/07/24 72.4 kg (159 lb 9.8 oz)   05/06/24 73.5 kg (162 lb)   05/03/24 72.3 kg (159 lb 6.3 oz)   04/29/24 74.3 kg (163 lb 14.4 oz)--->17% wt loss from this date to present (significant)   04/26/24 75.2 kg (165 lb 11.2 oz)   04/25/24 76 kg (167 lb 9.6 oz)   04/25/24 74.4 kg (164 lb 0.4 oz)   04/19/24 75.9 kg (167 lb 5.3 oz)   04/16/24 77.8 kg (171 lb 9.6 oz)   04/16/24 75.2 kg (165 lb 12.8 oz)   04/16/24 75.2 kg (165 lb 12.8 oz)   04/09/24 76.6 kg (168 lb 14 oz)   04/04/24 76.6 kg (168 lb 14 oz)   04/04/24 76.4 kg (168 lb 6.9 oz)   03/25/24 77.2 kg (170 lb 4.8 oz)   03/15/24 76.5 kg (168 lb 11.2 oz)   02/23/24 75.9 kg (167 lb 4.8 oz)   09/25/23 76.3 kg (168 lb 4.8 oz)--->19% wt loss from this date to present (significant)   05/22/23 76.1 kg (167 lb 11.2 oz)--->19% wt loss from this date to present (not significant)   02/07/22 73 kg (161 lb)   07/27/21 73.9 kg (163 lb)     Weights (since admit):        5/27/2024  1339--stated wt 5/28/2024  2211--standing scale wt          Weight: 63.5 kg (140 lb) 61.6 kg (135 lb 12.9 oz)          Nutrition Focused Physical Exam Findings:  defer: family entered to visit; visually appears thin  Edema:  Edema: none  Physical Findings:  Skin: Negative    Nutrition Significant Labs:  CBC Trend:   Results from last 7 days   Lab Units 05/29/24  0735 05/27/24  1621   WBC AUTO x10*3/uL 2.3* 2.2*   RBC  AUTO x10*6/uL 3.58* 3.94*   HEMOGLOBIN g/dL 10.3* 11.5*   HEMATOCRIT % 31.4* 32.7*   MCV fL 88 83   PLATELETS AUTO x10*3/uL 288 322   BMP Trend:   Results from last 7 days   Lab Units 05/29/24  0735 05/27/24 1621 05/24/24  1250   GLUCOSE mg/dL 85 88 71*   CALCIUM mg/dL 9.4 9.9 8.6   SODIUM mmol/L 143 136 138   POTASSIUM mmol/L 4.0 4.8 4.4   CO2 mmol/L 27 22 25   CHLORIDE mmol/L 99 95* 99   BUN mg/dL 28* 26* 21   CREATININE mg/dL 1.43* 1.52* 1.18   Liver Function Trend:   Results from last 7 days   Lab Units 05/27/24 1621 05/24/24  1250   ALK PHOS U/L 59 46   AST U/L 11 11   ALT U/L 7* 9*   BILIRUBIN TOTAL mg/dL 0.4 0.4   Renal Lab Trend:   Results from last 7 days   Lab Units 05/29/24  0735 05/27/24 1621 05/24/24  1250 05/24/24  1250   POTASSIUM mmol/L 4.0 4.8  --  4.4   PHOSPHORUS mg/dL 5.0*  --   --   --    SODIUM mmol/L 143 136  --  138   MAGNESIUM mg/dL 1.83 1.83   < >  --    EGFR mL/min/1.73m*2 64 59*  --  80   BUN mg/dL 28* 26*  --  21   CREATININE mg/dL 1.43* 1.52*  --  1.18    < > = values in this interval not displayed.   Vit D:   Lab Results   Component Value Date    VITD25 13 (A) 08/18/2021     Medications:  Scheduled medications  allopurinol, 100 mg, oral, Daily  ascorbic acid, 1,000 mg, oral, Daily  baclofen, 10 mg, oral, TID  enoxaparin, 40 mg, subcutaneous, q24h  fluconazole, 200 mg, oral, Daily  gabapentin, 800 mg, oral, TID  magnesium oxide, 400 mg, oral, Daily  magnesium sulfate, 2 g, intravenous, Once  pantoprazole, 40 mg, intravenous, Daily  psyllium, 1 packet, oral, Daily  sucralfate, 1 g, oral, q6h ROSALIO    PRN medications  PRN medications: colchicine, HYDROmorphone, HYDROmorphone, ondansetron **OR** ondansetron, oxyCODONE, prochlorperazine **OR** prochlorperazine **OR** prochlorperazine    I/O:   Last BM Date: 05/28/24    Dietary Orders (From admission, onward)       Start     Ordered    05/29/24 1409  Oral nutritional supplements  Until discontinued        Comments: Please provide Gelatein  Plus with dinner meal--pt may have more than once/day, if requested   Question Answer Comment   Deliver with Dinner    Select supplement: Product from home - please specify    Additional Details PRODUCT NOT FROM HOME--GELATEIN PLUS FROM KITCHEN        05/29/24 1408    05/29/24 0947  Adult diet Regular; Easy to chew; Mild thick 2  Diet effective now        Question Answer Comment   Diet type Regular    Texture Easy to chew    Fluid consistency Mild thick 2        05/29/24 0946                Estimated Needs:   Total Energy Estimated Needs (kCal): 2000+  Method for Estimating Needs: 62 (admit wt) x ~32+  Total Protein Estimated Needs (g): 80+  Method for Estimating Needs: 62 (admit wt) x ~1.3g/kg+  Total Fluid Estimated Needs (mL): per MD/team        Nutrition Diagnosis   Malnutrition Diagnosis  Patient has Malnutrition Diagnosis: Yes  Diagnosis Status: New  Malnutrition Diagnosis: Severe malnutrition related to acute disease or injury (h/o oral CA s/p recent radiation now with n/v and dysphagia)  As Evidenced by: pt consuming <50% estimated nutritional needs x >5 days with 10% wt loss in ~1 week/17% wt loss in ~1 month PTA    Additional Assessment Information: Pt may be at risk for refeeding, considering severe malnutrition. D/t use of small-bore feeding tube/temporary access, RDN to recommend use of Boost VHC for TF. See recs below. Did also discuss possible addition of oral nutrition supplements, pt declined any shakes, such as Boost VHC but was agreeable to trial Gelatein Plus supplement.       Nutrition Interventions/Recommendations     1. Start 100mg thiamin daily x 5-7 days.    2. Assure all lytes are replaced (as needed) and WNL, prior to starting any nutrition support.    3. When enteral access has been obtained, please start TF of Boost VHC @ 10ml/hr. Increase 10ml Q12H to goal rate of 40ml/hr, only as tolerated  --FWF, per MD/team (@ goal rate, pt would receive a total of ~648 mls free water/day).    --->If  at any point major shifts in lytes are noted as TF being advanced, please do not advance TF further. Hold steady @ current rate, replace lytes until WNL, then trial advancement of TF.    4. If pt tolerates continuous feeds, can transition to bolus feeds for home (please assure tip of small-bore feeding tube in stomach to allow for bolus feeds); recommend 1 carton Boost VHC 4 times/day (total of 4 cartons/day), only as tolerated.  --Flush with 60mls pre and post feeds with additional FWF between feeds, per MD/team (TF + recommended FWF would provide a total of 1120mls free water/day).  --Above TF regimen would provide pt a total of 2120 kcals, 88g pro/day--meets 100% estimated kcal and pro needs.    TF Monitoring:  --RFP + mag daily  --Accuchecks Q6H or per team  --Daily weights (standing preferred, if feasible)    5. PO diet/PO diet consistency, only as tolerated/as medically appropriate, per SLP recs.  --RDN placed order for Gelatein Plus supplement daily today for added nutrition (160 kcals, 20g pro each).        Nutrition Prescription for Enteral and Oral Nutrition: 2000+ kcals/day, 80+ g pro/day        Nutrition Interventions:   Interventions: Enteral intake  Enteral Intake: Other--begin TF once enteral access has been obtained  Goal: TF of Boost VHC @ goal rate of 40ml/ov=189lxz, 2112 kcals, 89 g pro, 648 mls free water/day--meets 100% estimated kcal and pro needs    Collaboration and Referral of Nutrition Care: Collaboration by nutrition professional with other providers  Goal: team resident    Nutrition Education:   Pt denied any questions on TF for now. Was agreeable to RDN providing information on Soft Diet.    Education Documentation  Soft Diet Guidelines, taught by Lien Rodriguez RDN, LD at 5/29/2024  2:39 PM.  Learner: Patient  Readiness: Acceptance  Method: Explanation, Handout  Response: Verbalizes Understanding  Comment: Provided Easy to Chew/Soft Diet Guidelines Handout from [facility]. Pt reported he  recieved education/handout from SLP on mildly-thick liquids.           Nutrition Monitoring and Evaluation   Food/Nutrient Related History Monitoring  Monitoring and Evaluation Plan: Enteral and parenteral nutrition intake  Enteral and Parenteral Nutrition Intake: Enteral nutrition intake  Criteria: tolerate TF @ goal rate of 40ml/hr    Body Composition/Growth/Weight History  Monitoring and Evaluation Plan: Weight  Weight: Measured weight  Criteria: maintain stable wt    Biochemical Data, Medical Tests and Procedures  Monitoring and Evaluation Plan: Electrolyte/renal panel, Glucose/endocrine profile  Electrolyte and Renal Panel: Magnesium, Phosphorus, Potassium, Sodium  Criteria: WNL  Glucose/Endocrine Profile: Glucose, casual  Criteria: glucose levels     Time Spent/Follow-up Reminder:   Time Spent (min): 60 minutes  Last Date of Nutrition Visit: 05/29/24  Nutrition Follow-Up Needed?: Dietitian to reassess per policy  Follow up Comment: PO diet + TF

## 2024-05-30 ENCOUNTER — APPOINTMENT (OUTPATIENT)
Dept: RADIATION ONCOLOGY | Facility: HOSPITAL | Age: 39
End: 2024-05-30
Payer: COMMERCIAL

## 2024-05-30 LAB
ALBUMIN SERPL BCP-MCNC: 3.8 G/DL (ref 3.4–5)
ALBUMIN SERPL BCP-MCNC: 3.9 G/DL (ref 3.4–5)
ANION GAP SERPL CALC-SCNC: 16 MMOL/L (ref 10–20)
ANION GAP SERPL CALC-SCNC: 18 MMOL/L (ref 10–20)
BASOPHILS # BLD AUTO: 0.02 X10*3/UL (ref 0–0.1)
BASOPHILS NFR BLD AUTO: 0.7 %
BUN SERPL-MCNC: 25 MG/DL (ref 6–23)
BUN SERPL-MCNC: 26 MG/DL (ref 6–23)
CALCIUM SERPL-MCNC: 9.1 MG/DL (ref 8.6–10.6)
CALCIUM SERPL-MCNC: 9.4 MG/DL (ref 8.6–10.6)
CHLORIDE SERPL-SCNC: 101 MMOL/L (ref 98–107)
CHLORIDE SERPL-SCNC: 102 MMOL/L (ref 98–107)
CO2 SERPL-SCNC: 25 MMOL/L (ref 21–32)
CO2 SERPL-SCNC: 27 MMOL/L (ref 21–32)
CREAT SERPL-MCNC: 1.35 MG/DL (ref 0.5–1.3)
CREAT SERPL-MCNC: 1.41 MG/DL (ref 0.5–1.3)
EGFRCR SERPLBLD CKD-EPI 2021: 65 ML/MIN/1.73M*2
EGFRCR SERPLBLD CKD-EPI 2021: 68 ML/MIN/1.73M*2
EOSINOPHIL # BLD AUTO: 0.06 X10*3/UL (ref 0–0.7)
EOSINOPHIL NFR BLD AUTO: 2 %
ERYTHROCYTE [DISTWIDTH] IN BLOOD BY AUTOMATED COUNT: 13.1 % (ref 11.5–14.5)
GLUCOSE BLD MANUAL STRIP-MCNC: 114 MG/DL (ref 74–99)
GLUCOSE SERPL-MCNC: 110 MG/DL (ref 74–99)
GLUCOSE SERPL-MCNC: 80 MG/DL (ref 74–99)
HCT VFR BLD AUTO: 29.5 % (ref 41–52)
HGB BLD-MCNC: 9.9 G/DL (ref 13.5–17.5)
IMM GRANULOCYTES # BLD AUTO: 0.01 X10*3/UL (ref 0–0.7)
IMM GRANULOCYTES NFR BLD AUTO: 0.3 % (ref 0–0.9)
LYMPHOCYTES # BLD AUTO: 0.45 X10*3/UL (ref 1.2–4.8)
LYMPHOCYTES NFR BLD AUTO: 15 %
MAGNESIUM SERPL-MCNC: 1.97 MG/DL (ref 1.6–2.4)
MAGNESIUM SERPL-MCNC: 2.3 MG/DL (ref 1.6–2.4)
MCH RBC QN AUTO: 29.9 PG (ref 26–34)
MCHC RBC AUTO-ENTMCNC: 33.6 G/DL (ref 32–36)
MCV RBC AUTO: 89 FL (ref 80–100)
MONOCYTES # BLD AUTO: 0.64 X10*3/UL (ref 0.1–1)
MONOCYTES NFR BLD AUTO: 21.3 %
NEUTROPHILS # BLD AUTO: 1.82 X10*3/UL (ref 1.2–7.7)
NEUTROPHILS NFR BLD AUTO: 60.7 %
NRBC BLD-RTO: 0 /100 WBCS (ref 0–0)
PHOSPHATE SERPL-MCNC: 3.6 MG/DL (ref 2.5–4.9)
PHOSPHATE SERPL-MCNC: 4.3 MG/DL (ref 2.5–4.9)
PLATELET # BLD AUTO: 231 X10*3/UL (ref 150–450)
POTASSIUM SERPL-SCNC: 3.9 MMOL/L (ref 3.5–5.3)
POTASSIUM SERPL-SCNC: 4.2 MMOL/L (ref 3.5–5.3)
RBC # BLD AUTO: 3.31 X10*6/UL (ref 4.5–5.9)
SODIUM SERPL-SCNC: 140 MMOL/L (ref 136–145)
SODIUM SERPL-SCNC: 141 MMOL/L (ref 136–145)
WBC # BLD AUTO: 3 X10*3/UL (ref 4.4–11.3)

## 2024-05-30 PROCEDURE — 1170000001 HC PRIVATE ONCOLOGY ROOM DAILY

## 2024-05-30 PROCEDURE — 2500000001 HC RX 250 WO HCPCS SELF ADMINISTERED DRUGS (ALT 637 FOR MEDICARE OP): Performed by: STUDENT IN AN ORGANIZED HEALTH CARE EDUCATION/TRAINING PROGRAM

## 2024-05-30 PROCEDURE — 2500000001 HC RX 250 WO HCPCS SELF ADMINISTERED DRUGS (ALT 637 FOR MEDICARE OP)

## 2024-05-30 PROCEDURE — 2500000004 HC RX 250 GENERAL PHARMACY W/ HCPCS (ALT 636 FOR OP/ED): Performed by: STUDENT IN AN ORGANIZED HEALTH CARE EDUCATION/TRAINING PROGRAM

## 2024-05-30 PROCEDURE — A4217 STERILE WATER/SALINE, 500 ML: HCPCS

## 2024-05-30 PROCEDURE — 99233 SBSQ HOSP IP/OBS HIGH 50: CPT

## 2024-05-30 PROCEDURE — 85025 COMPLETE CBC W/AUTO DIFF WBC: CPT

## 2024-05-30 PROCEDURE — 80069 RENAL FUNCTION PANEL: CPT

## 2024-05-30 PROCEDURE — 36415 COLL VENOUS BLD VENIPUNCTURE: CPT

## 2024-05-30 PROCEDURE — 82947 ASSAY GLUCOSE BLOOD QUANT: CPT

## 2024-05-30 PROCEDURE — 2500000004 HC RX 250 GENERAL PHARMACY W/ HCPCS (ALT 636 FOR OP/ED)

## 2024-05-30 PROCEDURE — C9113 INJ PANTOPRAZOLE SODIUM, VIA: HCPCS | Performed by: STUDENT IN AN ORGANIZED HEALTH CARE EDUCATION/TRAINING PROGRAM

## 2024-05-30 PROCEDURE — 2500000002 HC RX 250 W HCPCS SELF ADMINISTERED DRUGS (ALT 637 FOR MEDICARE OP, ALT 636 FOR OP/ED)

## 2024-05-30 PROCEDURE — 83735 ASSAY OF MAGNESIUM: CPT

## 2024-05-30 RX ORDER — ONDANSETRON HYDROCHLORIDE 8 MG/1
8 TABLET, FILM COATED ORAL EVERY 8 HOURS PRN
Status: DISCONTINUED | OUTPATIENT
Start: 2024-05-30 | End: 2024-06-01 | Stop reason: HOSPADM

## 2024-05-30 RX ORDER — ONDANSETRON HYDROCHLORIDE 2 MG/ML
8 INJECTION, SOLUTION INTRAVENOUS EVERY 8 HOURS PRN
Status: DISCONTINUED | OUTPATIENT
Start: 2024-05-30 | End: 2024-06-01 | Stop reason: HOSPADM

## 2024-05-30 RX ORDER — POTASSIUM CHLORIDE 750 MG/1
10 TABLET, FILM COATED, EXTENDED RELEASE ORAL ONCE
Status: DISCONTINUED | OUTPATIENT
Start: 2024-05-30 | End: 2024-05-30

## 2024-05-30 RX ORDER — LANOLIN ALCOHOL/MO/W.PET/CERES
100 CREAM (GRAM) TOPICAL DAILY
Status: DISCONTINUED | OUTPATIENT
Start: 2024-05-30 | End: 2024-06-01 | Stop reason: HOSPADM

## 2024-05-30 RX ORDER — ONDANSETRON HYDROCHLORIDE 2 MG/ML
4 INJECTION, SOLUTION INTRAVENOUS EVERY 8 HOURS PRN
Status: DISCONTINUED | OUTPATIENT
Start: 2024-05-30 | End: 2024-05-30

## 2024-05-30 RX ORDER — GLYCOPYRROLATE 2 MG/1
2 TABLET ORAL 3 TIMES DAILY
Status: DISCONTINUED | OUTPATIENT
Start: 2024-05-30 | End: 2024-05-31

## 2024-05-30 RX ORDER — BACLOFEN 10 MG/1
10 TABLET ORAL 3 TIMES DAILY PRN
Status: DISCONTINUED | OUTPATIENT
Start: 2024-05-30 | End: 2024-06-01 | Stop reason: HOSPADM

## 2024-05-30 RX ORDER — ONDANSETRON HYDROCHLORIDE 8 MG/1
8 TABLET, FILM COATED ORAL EVERY 8 HOURS PRN
Status: DISCONTINUED | OUTPATIENT
Start: 2024-05-30 | End: 2024-05-30

## 2024-05-30 RX ORDER — MAGNESIUM SULFATE HEPTAHYDRATE 40 MG/ML
2 INJECTION, SOLUTION INTRAVENOUS ONCE
Status: COMPLETED | OUTPATIENT
Start: 2024-05-30 | End: 2024-05-31

## 2024-05-30 RX ORDER — POTASSIUM CHLORIDE 1.5 G/1.58G
20 POWDER, FOR SOLUTION ORAL ONCE
Status: COMPLETED | OUTPATIENT
Start: 2024-05-30 | End: 2024-05-30

## 2024-05-30 RX ADMIN — MAGNESIUM SULFATE HEPTAHYDRATE 2 G: 40 INJECTION, SOLUTION INTRAVENOUS at 22:43

## 2024-05-30 RX ADMIN — FLUCONAZOLE 200 MG: 40 POWDER, FOR SUSPENSION ORAL at 20:07

## 2024-05-30 RX ADMIN — ALLOPURINOL 100 MG: 100 TABLET ORAL at 08:23

## 2024-05-30 RX ADMIN — SUCRALFATE 1 G: 1 SUSPENSION ORAL at 23:48

## 2024-05-30 RX ADMIN — SUCRALFATE 1 G: 1 SUSPENSION ORAL at 17:36

## 2024-05-30 RX ADMIN — PANTOPRAZOLE SODIUM 40 MG: 40 INJECTION, POWDER, FOR SOLUTION INTRAVENOUS at 08:23

## 2024-05-30 RX ADMIN — SUCRALFATE 1 G: 1 SUSPENSION ORAL at 11:10

## 2024-05-30 RX ADMIN — GLYCOPYRROLATE 2 MG: 2 TABLET ORAL at 11:10

## 2024-05-30 RX ADMIN — POTASSIUM CHLORIDE 20 MEQ: 1.5 POWDER, FOR SOLUTION ORAL at 22:43

## 2024-05-30 RX ADMIN — BACLOFEN 10 MG: 10 TABLET ORAL at 08:23

## 2024-05-30 RX ADMIN — ONDANSETRON 4 MG: 2 INJECTION INTRAMUSCULAR; INTRAVENOUS at 06:36

## 2024-05-30 RX ADMIN — GABAPENTIN 800 MG: 250 SOLUTION ORAL at 08:33

## 2024-05-30 RX ADMIN — OXYCODONE HYDROCHLORIDE AND ACETAMINOPHEN 1000 MG: 500 TABLET ORAL at 08:23

## 2024-05-30 RX ADMIN — GABAPENTIN 800 MG: 250 SOLUTION ORAL at 16:06

## 2024-05-30 RX ADMIN — GLYCOPYRROLATE 2 MG: 2 TABLET ORAL at 16:06

## 2024-05-30 RX ADMIN — PSYLLIUM HUSK 1 PACKET: 3.4 POWDER ORAL at 09:00

## 2024-05-30 RX ADMIN — PROCHLORPERAZINE EDISYLATE 10 MG: 5 INJECTION INTRAMUSCULAR; INTRAVENOUS at 01:27

## 2024-05-30 RX ADMIN — MAGNESIUM OXIDE TAB 400 MG (241.3 MG ELEMENTAL MG) 400 MG: 400 (241.3 MG) TAB at 08:23

## 2024-05-30 RX ADMIN — COLCHICINE 1.2 MG: 0.6 TABLET, FILM COATED ORAL at 13:57

## 2024-05-30 RX ADMIN — THIAMINE HCL TAB 100 MG 100 MG: 100 TAB at 08:23

## 2024-05-30 RX ADMIN — GLYCOPYRROLATE 2 MG: 2 TABLET ORAL at 20:08

## 2024-05-30 RX ADMIN — GABAPENTIN 800 MG: 250 SOLUTION ORAL at 20:07

## 2024-05-30 ASSESSMENT — PAIN SCALES - GENERAL
PAINLEVEL_OUTOF10: 0 - NO PAIN
PAINLEVEL_OUTOF10: 2
PAINLEVEL_OUTOF10: 0 - NO PAIN
PAINLEVEL_OUTOF10: 3
PAINLEVEL_OUTOF10: 0 - NO PAIN
PAINLEVEL_OUTOF10: 0 - NO PAIN

## 2024-05-30 ASSESSMENT — COGNITIVE AND FUNCTIONAL STATUS - GENERAL
DAILY ACTIVITIY SCORE: 24
DAILY ACTIVITIY SCORE: 23
MOBILITY SCORE: 24
MOBILITY SCORE: 24
EATING MEALS: A LITTLE

## 2024-05-30 ASSESSMENT — PAIN - FUNCTIONAL ASSESSMENT
PAIN_FUNCTIONAL_ASSESSMENT: 0-10

## 2024-05-30 ASSESSMENT — PAIN DESCRIPTION - LOCATION: LOCATION: LEG

## 2024-05-30 ASSESSMENT — PAIN SCALES - PAIN ASSESSMENT IN ADVANCED DEMENTIA (PAINAD): TOTALSCORE: MEDICATION (SEE MAR)

## 2024-05-30 ASSESSMENT — PAIN DESCRIPTION - ORIENTATION: ORIENTATION: LEFT

## 2024-05-30 NOTE — PROGRESS NOTES
"Subjective     Overnight events:  No acute overnight events. States that the throat pain is better than admission but still present. Denies any Subj. Fevers/chills, vomiting but does have nausea moderately controlled by PRN medications. Additionally, he continues to endorse significant mucus production with occasional blood clots/streaks; denies abdominal discomfort. He has not tried to eat anything since the dobhoff was placed but is tolerating liquid medications. Encouraged PO intake in addition to tube feeds.     Vital signs:  Blood pressure 124/84, pulse 89, temperature 37.6 °C (99.7 °F), temperature source Temporal, resp. rate 18, height 1.727 m (5' 8\"), weight 61.6 kg (135 lb 12.9 oz), SpO2 96%.    I/O last 3 completed shifts:  In: 1000 (16.2 mL/kg) [I.V.:1000 (16.2 mL/kg)]  Out: - (0 mL/kg)   Weight: 61.6 kg       Physical Exam  Constitutional:       General: He is not in acute distress.     Appearance: He is not ill-appearing.   HENT:      Head:      Comments: Several small white lesions in clusters in the mouth. Roof of mouth also white and easily scrapes away with pressure.     Mouth/Throat:      Mouth: Mucous membranes are moist.   Eyes:      General: No scleral icterus.     Extraocular Movements: Extraocular movements intact.   Neck:      Comments: Visible skin hyperpigmentation of the neck. (Pt noted that it presented following start of radiation).  Cardiovascular:      Rate and Rhythm: Normal rate and regular rhythm.      Pulses: Normal pulses.      Heart sounds: Normal heart sounds.   Pulmonary:      Effort: Pulmonary effort is normal.      Breath sounds: Normal breath sounds. No wheezing, rhonchi or rales.   Abdominal:      General: Abdomen is flat. Bowel sounds are normal. There is no distension.      Palpations: Abdomen is soft.      Tenderness: There is no abdominal tenderness.   Musculoskeletal:         General: No swelling.   Skin:     General: Skin is warm and dry.      Coloration: Skin is not " jaundiced.   Neurological:      General: No focal deficit present.      Mental Status: He is oriented to person, place, and time.       Relevant Results        Labs:  Last CBC:  Lab Results   Component Value Date    WBC 3.0 (L) 05/30/2024    HGB 9.9 (L) 05/30/2024    HCT 29.5 (L) 05/30/2024    MCV 89 05/30/2024     05/30/2024       Last RFP:  Lab Results   Component Value Date    GLUCOSE 80 05/30/2024    CALCIUM 9.4 05/30/2024     05/30/2024    K 4.2 05/30/2024    CO2 25 05/30/2024     05/30/2024    BUN 26 (H) 05/30/2024    CREATININE 1.41 (H) 05/30/2024       Last LFTs:  Lab Results   Component Value Date    ALT 7 (L) 05/27/2024    AST 11 05/27/2024    ALKPHOS 59 05/27/2024    BILITOT 0.4 05/27/2024       Last coags:  Lab Results   Component Value Date    INR 1.3 (H) 05/27/2024    APTT 29 05/27/2024       Micro/culture data:  No results found for the last 90 days.      Imaging:  XR abdomen 1 view  Narrative: Interpreted By:  Moise Estrada and Baker Zachary   STUDY:  XR ABDOMEN 1 VIEW;  5/29/2024 5:45 pm      INDICATION:  Signs/Symptoms:Confirmation of Dobhoff placement.      COMPARISON:  None.      ACCESSION NUMBER(S):  [id number]      ORDERING CLINICIAN:  THOMAS SPEAR      FINDINGS:  Single AP view of the abdomen.      Enteric tube tip overlies the expected position of the gastric body.      Nonobstructive bowel gas pattern. Contrast material noted throughout  the large bowel. Limited evaluation of pneumoperitoneum on supine  imaging, however no gross evidence of free air is noted.      Visualized lungs are clear.      Osseous structures demonstrate no acute bony changes.      Impression: 1. Enteric tube tip overlies the expected position of the gastric  body.  2. Nonobstructive bowel gas pattern.      I personally reviewed the images/study and I agree with the findings  as stated by Dr. Guero Staton M.D. This study was interpreted at  [facility],  [place], [place].      MACRO:  None      Signed by: Moise Estrada 5/30/2024 7:29 AM  Dictation workstation:   [id number]         Assessment/Plan   Principal Problem:    Vomiting without nausea, unspecified vomiting type    Piol Hanks is a 39 y.o. 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 He reports one month of worsening oral dysphagia. Patient swallowing could be functional, anatomic, neoplastic or neuromuscular. Likely a complication of recent radiation therapy. Primary Oncologist is Dr. K. Burkitt; Primary Rad Oncologist Dr. TRISHA Blackman.    Updates:  - Started tube feeds per Nutrition recs, will increase rate q6hrs as tolerated towards goal.  - Will monitor for refeeding with serial labs.  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 Burkitt, and Dr. Blackman both aware of admission and presentation.  - Will consider temporary nutritional supplement options given completion of radiation.  - Pt has elected to have Dobhoff placed to assist in maintaining nutritional needs as his throat recovers.  - Robinul for excess secretions    #Oral Candida  #C/f Candidal Esophagitis  - Magic Mouthwash  - Oral Fluconazole 200 daily (5/28- )  (for 21 days depending on response to therapy)  - Cont PPI  - Cont Sucralfate    #DELON/CKD   Estimated Creatinine Clearance: 60.7 mL/min (A) (by C-G formula based on SCr of 1.41 mg/dL (H)). Baseline ~  1.2-1.3  S/p 1L LR in ED, w/ Maintenance Fluids following while NPO.  :: UA without concern for infection  - DELON likely prerenal in etiology  - Encourage PO intake, monitor for refeeding    #Anemia  Likely 2/2 to chemotherapy on chronological review   :: Retic 1.5% (18.4% immature) RI: Hypo-proliferative    - Will CTM    #Gout  - c/w home Allopurinol 100mg daily, colchicine 1.2mg Q12H PRN (DELON likely 2/2 to prerenal etiology/hypovolemic dehydration; will monitor response to IVF and consider d'cing Colchicine as appropriate)      F: Replete PRN  E: Replete PRN  N: Regular Diet (Easy to chew/Thick Mild) + tube feeds  DVT Ppx: Heparin SubQ   GI Ppx: Pantoprazole    Access/Lines: PIV   Mane None  Abx: None  O2: None    Pain regimen: Tylenol   GI Laxative:  None    Code status: Full Code  Emergency contact: Fei Hanks (Wife) [phone]       Nilton Garcia MD  PGY-1 Internal Medicine

## 2024-05-30 NOTE — CARE PLAN
Problem: Skin  Goal: Decreased wound size/increased tissue granulation at next dressing change  Outcome: Progressing  Goal: Participates in plan/prevention/treatment measures  Outcome: Progressing  Goal: Prevent/manage excess moisture  Outcome: Progressing  Goal: Prevent/minimize sheer/friction injuries  Outcome: Progressing  Goal: Promote/optimize nutrition  Outcome: Progressing  Goal: Promote skin healing  Outcome: Progressing     Problem: Pain  Goal: My pain/discomfort is manageable  Outcome: Progressing     Problem: Safety  Goal: Patient will be injury free during hospitalization  Outcome: Progressing  Goal: I will remain free of falls  Outcome: Progressing     Problem: Daily Care  Goal: Daily care needs are met  Outcome: Progressing     Problem: Psychosocial Needs  Goal: Demonstrates ability to cope with hospitalization/illness  Outcome: Progressing  Goal: Collaborate with me, my family, and caregiver to identify my specific goals  Outcome: Progressing     Problem: Discharge Barriers  Goal: My discharge needs are met  Outcome: Progressing       The clinical goals for the shift include pt will have no nausea throughout shift    Over the shift, the patient remained safe and free from injury. Minimal pain reported throughout shift. One episode of emesis, antiemetics given PRN. Vitals stable throughout shift. Tube feed unavailable and to start today.

## 2024-05-30 NOTE — CARE PLAN
Problem: Daily Care  Goal: Daily care needs are met  Outcome: Progressing     Problem: Psychosocial Needs  Goal: Demonstrates ability to cope with hospitalization/illness  Outcome: Progressing     Problem: Safety  Goal: Patient will be injury free during hospitalization  Outcome: Progressing   The patient's goals for the shift include      The clinical goals for the shift include Start tube feed  and have no nausea    Pt is alert and oriented at bedside. Pt shows no s/s of distress on room air. Pt does report some nausea. Tube feedings started and dobhoff is in right nare bridled and patent. Bed in lowest position and call light within reach. Will continue to monitor and assess.

## 2024-05-31 ENCOUNTER — APPOINTMENT (OUTPATIENT)
Dept: RADIATION ONCOLOGY | Facility: HOSPITAL | Age: 39
End: 2024-05-31
Payer: COMMERCIAL

## 2024-05-31 ENCOUNTER — HOME HEALTH ADMISSION (OUTPATIENT)
Dept: HOME HEALTH SERVICES | Facility: HOME HEALTH | Age: 39
End: 2024-05-31
Payer: COMMERCIAL

## 2024-05-31 ENCOUNTER — DOCUMENTATION (OUTPATIENT)
Dept: HOME HEALTH SERVICES | Facility: HOME HEALTH | Age: 39
End: 2024-05-31
Payer: COMMERCIAL

## 2024-05-31 ENCOUNTER — APPOINTMENT (OUTPATIENT)
Dept: HEMATOLOGY/ONCOLOGY | Facility: HOSPITAL | Age: 39
End: 2024-05-31
Payer: COMMERCIAL

## 2024-05-31 LAB
ALBUMIN SERPL BCP-MCNC: 4 G/DL (ref 3.4–5)
ANION GAP SERPL CALC-SCNC: 14 MMOL/L (ref 10–20)
BASOPHILS # BLD AUTO: 0.01 X10*3/UL (ref 0–0.1)
BASOPHILS NFR BLD AUTO: 0.3 %
BUN SERPL-MCNC: 25 MG/DL (ref 6–23)
CALCIUM SERPL-MCNC: 9 MG/DL (ref 8.6–10.6)
CHLORIDE SERPL-SCNC: 101 MMOL/L (ref 98–107)
CO2 SERPL-SCNC: 28 MMOL/L (ref 21–32)
CREAT SERPL-MCNC: 1.34 MG/DL (ref 0.5–1.3)
EGFRCR SERPLBLD CKD-EPI 2021: 69 ML/MIN/1.73M*2
EOSINOPHIL # BLD AUTO: 0.04 X10*3/UL (ref 0–0.7)
EOSINOPHIL NFR BLD AUTO: 1.4 %
ERYTHROCYTE [DISTWIDTH] IN BLOOD BY AUTOMATED COUNT: 12.9 % (ref 11.5–14.5)
GLUCOSE SERPL-MCNC: 85 MG/DL (ref 74–99)
HCT VFR BLD AUTO: 31.1 % (ref 41–52)
HGB BLD-MCNC: 10.4 G/DL (ref 13.5–17.5)
IMM GRANULOCYTES # BLD AUTO: 0.01 X10*3/UL (ref 0–0.7)
IMM GRANULOCYTES NFR BLD AUTO: 0.3 % (ref 0–0.9)
LYMPHOCYTES # BLD AUTO: 0.41 X10*3/UL (ref 1.2–4.8)
LYMPHOCYTES NFR BLD AUTO: 13.9 %
MAGNESIUM SERPL-MCNC: 2.4 MG/DL (ref 1.6–2.4)
MCH RBC QN AUTO: 29 PG (ref 26–34)
MCHC RBC AUTO-ENTMCNC: 33.4 G/DL (ref 32–36)
MCV RBC AUTO: 87 FL (ref 80–100)
MONOCYTES # BLD AUTO: 0.5 X10*3/UL (ref 0.1–1)
MONOCYTES NFR BLD AUTO: 16.9 %
NEUTROPHILS # BLD AUTO: 1.99 X10*3/UL (ref 1.2–7.7)
NEUTROPHILS NFR BLD AUTO: 67.2 %
NRBC BLD-RTO: 0 /100 WBCS (ref 0–0)
PHOSPHATE SERPL-MCNC: 2.9 MG/DL (ref 2.5–4.9)
PLATELET # BLD AUTO: 240 X10*3/UL (ref 150–450)
POTASSIUM SERPL-SCNC: 4.1 MMOL/L (ref 3.5–5.3)
RBC # BLD AUTO: 3.59 X10*6/UL (ref 4.5–5.9)
SODIUM SERPL-SCNC: 139 MMOL/L (ref 136–145)
WBC # BLD AUTO: 3 X10*3/UL (ref 4.4–11.3)

## 2024-05-31 PROCEDURE — A4217 STERILE WATER/SALINE, 500 ML: HCPCS

## 2024-05-31 PROCEDURE — 2500000004 HC RX 250 GENERAL PHARMACY W/ HCPCS (ALT 636 FOR OP/ED)

## 2024-05-31 PROCEDURE — 2500000001 HC RX 250 WO HCPCS SELF ADMINISTERED DRUGS (ALT 637 FOR MEDICARE OP)

## 2024-05-31 PROCEDURE — 85025 COMPLETE CBC W/AUTO DIFF WBC: CPT

## 2024-05-31 PROCEDURE — 36415 COLL VENOUS BLD VENIPUNCTURE: CPT

## 2024-05-31 PROCEDURE — 99233 SBSQ HOSP IP/OBS HIGH 50: CPT

## 2024-05-31 PROCEDURE — C9113 INJ PANTOPRAZOLE SODIUM, VIA: HCPCS | Performed by: STUDENT IN AN ORGANIZED HEALTH CARE EDUCATION/TRAINING PROGRAM

## 2024-05-31 PROCEDURE — RXMED WILLOW AMBULATORY MEDICATION CHARGE

## 2024-05-31 PROCEDURE — 92526 ORAL FUNCTION THERAPY: CPT | Mod: GN | Performed by: SPEECH-LANGUAGE PATHOLOGIST

## 2024-05-31 PROCEDURE — 1170000001 HC PRIVATE ONCOLOGY ROOM DAILY

## 2024-05-31 PROCEDURE — 2500000001 HC RX 250 WO HCPCS SELF ADMINISTERED DRUGS (ALT 637 FOR MEDICARE OP): Performed by: STUDENT IN AN ORGANIZED HEALTH CARE EDUCATION/TRAINING PROGRAM

## 2024-05-31 PROCEDURE — 84100 ASSAY OF PHOSPHORUS: CPT

## 2024-05-31 PROCEDURE — 83735 ASSAY OF MAGNESIUM: CPT

## 2024-05-31 PROCEDURE — 2500000004 HC RX 250 GENERAL PHARMACY W/ HCPCS (ALT 636 FOR OP/ED): Performed by: STUDENT IN AN ORGANIZED HEALTH CARE EDUCATION/TRAINING PROGRAM

## 2024-05-31 PROCEDURE — 2500000002 HC RX 250 W HCPCS SELF ADMINISTERED DRUGS (ALT 637 FOR MEDICARE OP, ALT 636 FOR OP/ED)

## 2024-05-31 RX ORDER — LANOLIN ALCOHOL/MO/W.PET/CERES
100 CREAM (GRAM) TOPICAL DAILY
Qty: 5 TABLET | Refills: 0 | Status: SHIPPED | OUTPATIENT
Start: 2024-06-01 | End: 2024-06-06

## 2024-05-31 RX ORDER — GLYCOPYRROLATE 2 MG/1
2 TABLET ORAL 3 TIMES DAILY
Qty: 90 TABLET | Refills: 1 | Status: SHIPPED | OUTPATIENT
Start: 2024-05-31 | End: 2024-07-30

## 2024-05-31 RX ORDER — SUCRALFATE 1 G/10ML
1 SUSPENSION ORAL EVERY 6 HOURS SCHEDULED
Qty: 1200 ML | Refills: 1 | Status: SHIPPED | OUTPATIENT
Start: 2024-05-31 | End: 2024-07-30

## 2024-05-31 RX ORDER — PSEUDOEPHEDRINE HCL 30 MG
30 TABLET ORAL EVERY 4 HOURS PRN
Qty: 30 TABLET | Refills: 1 | Status: SHIPPED | OUTPATIENT
Start: 2024-05-31 | End: 2024-07-30

## 2024-05-31 RX ORDER — GLYCOPYRROLATE 0.2 MG/ML
0.4 INJECTION INTRAMUSCULAR; INTRAVENOUS EVERY 6 HOURS
Qty: 240 ML | Refills: 1 | Status: CANCELLED | OUTPATIENT
Start: 2024-05-31 | End: 2024-07-30

## 2024-05-31 RX ORDER — ACETAMINOPHEN 160 MG/5ML
650 SOLUTION ORAL EVERY 6 HOURS PRN
Status: DISCONTINUED | OUTPATIENT
Start: 2024-05-31 | End: 2024-06-01 | Stop reason: HOSPADM

## 2024-05-31 RX ORDER — FLUCONAZOLE 40 MG/ML
200 POWDER, FOR SUSPENSION ORAL DAILY
Qty: 90 ML | Refills: 0 | Status: SHIPPED | OUTPATIENT
Start: 2024-05-31 | End: 2024-06-18

## 2024-05-31 RX ORDER — GLYCOPYRROLATE 0.2 MG/ML
0.4 INJECTION INTRAMUSCULAR; INTRAVENOUS EVERY 6 HOURS
Status: DISCONTINUED | OUTPATIENT
Start: 2024-05-31 | End: 2024-06-01 | Stop reason: HOSPADM

## 2024-05-31 RX ORDER — PANTOPRAZOLE SODIUM 40 MG/1
40 TABLET, DELAYED RELEASE ORAL DAILY
Qty: 30 TABLET | Refills: 1 | Status: SHIPPED | OUTPATIENT
Start: 2024-05-31 | End: 2024-07-30

## 2024-05-31 RX ADMIN — SUCRALFATE 1 G: 1 SUSPENSION ORAL at 11:45

## 2024-05-31 RX ADMIN — SUCRALFATE 1 G: 1 SUSPENSION ORAL at 17:57

## 2024-05-31 RX ADMIN — SUCRALFATE 1 G: 1 SUSPENSION ORAL at 05:58

## 2024-05-31 RX ADMIN — OXYCODONE HYDROCHLORIDE AND ACETAMINOPHEN 1000 MG: 500 TABLET ORAL at 08:37

## 2024-05-31 RX ADMIN — GABAPENTIN 800 MG: 250 SOLUTION ORAL at 08:32

## 2024-05-31 RX ADMIN — GABAPENTIN 800 MG: 250 SOLUTION ORAL at 15:05

## 2024-05-31 RX ADMIN — COLCHICINE 1.2 MG: 0.6 TABLET, FILM COATED ORAL at 20:58

## 2024-05-31 RX ADMIN — GLYCOPYRROLATE 0.4 MG: 0.2 INJECTION, SOLUTION INTRAMUSCULAR; INTRAVENOUS at 20:58

## 2024-05-31 RX ADMIN — THIAMINE HCL TAB 100 MG 100 MG: 100 TAB at 08:44

## 2024-05-31 RX ADMIN — GLYCOPYRROLATE 0.4 MG: 0.2 INJECTION, SOLUTION INTRAMUSCULAR; INTRAVENOUS at 15:05

## 2024-05-31 RX ADMIN — GLYCOPYRROLATE 2 MG: 2 TABLET ORAL at 08:32

## 2024-05-31 RX ADMIN — GABAPENTIN 800 MG: 250 SOLUTION ORAL at 21:37

## 2024-05-31 RX ADMIN — FLUCONAZOLE 200 MG: 40 POWDER, FOR SUSPENSION ORAL at 20:58

## 2024-05-31 RX ADMIN — HYDROMORPHONE HYDROCHLORIDE 0.2 MG: 0.2 INJECTION, SOLUTION INTRAMUSCULAR; INTRAVENOUS; SUBCUTANEOUS at 08:35

## 2024-05-31 RX ADMIN — MAGNESIUM OXIDE TAB 400 MG (241.3 MG ELEMENTAL MG) 400 MG: 400 (241.3 MG) TAB at 08:51

## 2024-05-31 RX ADMIN — ONDANSETRON 8 MG: 2 INJECTION INTRAMUSCULAR; INTRAVENOUS at 16:00

## 2024-05-31 RX ADMIN — ALLOPURINOL 100 MG: 100 TABLET ORAL at 08:33

## 2024-05-31 RX ADMIN — HYDROMORPHONE HYDROCHLORIDE 0.2 MG: 0.2 INJECTION, SOLUTION INTRAMUSCULAR; INTRAVENOUS; SUBCUTANEOUS at 21:36

## 2024-05-31 RX ADMIN — PANTOPRAZOLE SODIUM 40 MG: 40 INJECTION, POWDER, FOR SOLUTION INTRAVENOUS at 08:35

## 2024-05-31 ASSESSMENT — PAIN - FUNCTIONAL ASSESSMENT: PAIN_FUNCTIONAL_ASSESSMENT: 0-10

## 2024-05-31 ASSESSMENT — COGNITIVE AND FUNCTIONAL STATUS - GENERAL
EATING MEALS: A LITTLE
DAILY ACTIVITIY SCORE: 23
MOBILITY SCORE: 24
EATING MEALS: A LITTLE
MOBILITY SCORE: 24
DAILY ACTIVITIY SCORE: 23

## 2024-05-31 ASSESSMENT — PAIN SCALES - GENERAL
PAINLEVEL_OUTOF10: 5 - MODERATE PAIN
PAINLEVEL_OUTOF10: 4

## 2024-05-31 NOTE — HH CARE COORDINATION
Home Care received a Referral for Nursing. We have processed the referral for a Start of Care on 24-48 HOURS POST DC .     If you have any questions or concerns, please feel free to contact us at [phone]. Follow the prompts, enter your five digit zip code, and you will be directed to your care team on [facility] 2.

## 2024-05-31 NOTE — PROGRESS NOTES
"Speech-Language Pathology  Adult Inpatient Swallow Treatment    Patient Name: Pilo Hanks  MRN: [id number]  Today's Date: 5/31/2024   Start Time: 1018  Stop Time: 1040  Time Calculation (min): 22    Impression:   Dysphagia therapy completed. Pt performed swallow exercises in 2 set of 10 each. Pt endorsed min pain to complete. Pt not eating well, just pudding and liquids, food \"doesn't taste good\". Trials of sips of water, however coughing occurred with second sip. Provided simply thick packets to use at home to thicken liquids. Encourage pt to follow up with OP SLP upon discharge.     Spoke with MD regarding tube feeding timing and for SLP OP follow up appointment.      Recommendations:  Soft diet with mildly thick liquids  Upright for all PO intake  Remain upright for 20-30 min after eating  Small bites/sips  Pills per pt preference     -Further recommend small frequent meals as to not overwhelm pt as well as improve easy of swallow.   -Use of BMX prior to each meal to improved pain management.   -Free water protocol between meals.   Goal:   Pt. will tolerate least restrictive diet without overt s/s of aspiration with 100% accuracy.  Pt will utilized safe swallow strategies independently during meals       Plan:  SLP Services Indicated: Yes  Frequency: x1/wk  Discussed POC with patient  SLP - OK to Discharge    Pain:   0-10  0 = No pain.     Inpatient Education:  Extensive education provided to patient regarding current swallow function, recommendations/results, and POC.      Consultations/Referrals/Coordination of Services:   N/A   "

## 2024-05-31 NOTE — PROGRESS NOTES
05/31/24 1328   Discharge Planning   Who is requesting discharge planning? Provider   Home or Post Acute Services In home services   Type of Home Care Services Home nursing visits   Patient expects to be discharged to: home   Does the patient need discharge transport arranged? No     5/31/24 @ 1330  Patient will discharge home with his DHT.  Patient states he already has some chocolate Boost at home.  He is able to get it discounted at [facility] if needed per AME Mccracken RN will provide the patient with 4-5 syringes to give his Boost through his DHT.  [facility] referral placed for RN.  Patient is agreeable. He has a spouse who is a RN. Will continue to follow for any additional needs. ADOD 6/1 after teaching with bolus.  Sherri Vernon RN TCC

## 2024-05-31 NOTE — HOSPITAL COURSE
39 y.o. 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 He reports one month of worsening oral dysphagia. Patient swallowing could be functional, anatomic, neoplastic or neuromuscular. Likely a complication of recent radiation therapy. Primary Oncologist is Dr. K. Burkitt; Primary Rad Oncologist Dr. TRISHA Blackman. Presents to  5/27 with progressive dysphagia and odynophagia and overall concern for worsening protein malnutrition. On speech evaluation, cleared for Soft solids with mildy thickened liquids. Candida noted in oropharynx on exam raising concern for candidal esophagitis vs radiation esophagitis or a mixed picture. Started on PPI, Sucralfate, and oral Fluconazole for this. The decision for dobhoff placement vs PEG placement was weight with the help of his primary oncologist and radiation oncologist. Given there remained concern for adequate PO intake and he had completed his radiation sessions it was determined a dobhoff is the best option given the potential for recovery. He noted excess secretion which induce nausea which we have supported with Zofran, Robinul, and Sudafed.      Per Speech Language Pathology:  Recommendations:  Soft diet with mildly thick liquids  Upright for all PO intake  Remain upright for 20-30 min after eating  Small bites/sips  Pills per pt preference     -Further recommend small frequent meals as to not overwhelm pt as well as improve easy of swallow.   -Use of BMX prior to each meal to improved pain management.   -Free water protocol between meals.   Goal:   Pt. will tolerate least restrictive diet without overt s/s of aspiration with 100% accuracy.  Pt will utilized safe swallow strategies independently during meals

## 2024-05-31 NOTE — PROGRESS NOTES
"Subjective     Overnight events:  NAEON. Throat pain remains unchanged \"3/10\". No B-symptoms. Biggest complaint remains his mucus production. Mildly improved after starting Robinul. Reports mild sinus pressure \"likely from the NG tube\" that is bearable.  Is tolerated PO intake, just states the food isn't that good in the hospital. He states that he has nausea only when spitting up large amounts of mucus. At goal on tube feeds without GI upset.      Vital signs:  Blood pressure 118/87, pulse 106, temperature 36.8 °C (98.3 °F), temperature source Temporal, resp. rate 18, height 1.727 m (5' 8\"), weight 61.6 kg (135 lb 12.9 oz), SpO2 98%.    I/O last 3 completed shifts:  In: 1027 (16.7 mL/kg) [I.V.:100 (1.6 mL/kg); NG/GT:927]  Out: 0 (0 mL/kg)   Weight: 61.6 kg       Physical Exam  Constitutional:       General: He is not in acute distress.     Appearance: He is not ill-appearing.   HENT:      Head:      Comments: Several small white lesions in clusters in the mouth. Roof of mouth also white and easily scrapes away with pressure.     Mouth/Throat:      Mouth: Mucous membranes are moist.   Eyes:      General: No scleral icterus.     Extraocular Movements: Extraocular movements intact.   Neck:      Comments: Visible skin hyperpigmentation of the neck. (Pt noted that it presented following start of radiation).  Cardiovascular:      Rate and Rhythm: Normal rate and regular rhythm.      Pulses: Normal pulses.      Heart sounds: Normal heart sounds.   Pulmonary:      Effort: Pulmonary effort is normal.      Breath sounds: Normal breath sounds. No wheezing, rhonchi or rales.   Abdominal:      General: Abdomen is flat. Bowel sounds are normal. There is no distension.      Palpations: Abdomen is soft.      Tenderness: There is no abdominal tenderness.   Musculoskeletal:         General: No swelling.   Skin:     General: Skin is warm and dry.      Coloration: Skin is not jaundiced.   Neurological:      General: No focal deficit " present.      Mental Status: He is oriented to person, place, and time.       Relevant Results        Labs:  Last CBC:  Lab Results   Component Value Date    WBC 3.0 (L) 05/30/2024    HGB 9.9 (L) 05/30/2024    HCT 29.5 (L) 05/30/2024    MCV 89 05/30/2024     05/30/2024       Last RFP:  Lab Results   Component Value Date    GLUCOSE 110 (H) 05/30/2024    CALCIUM 9.1 05/30/2024     05/30/2024    K 3.9 05/30/2024    CO2 27 05/30/2024     05/30/2024    BUN 25 (H) 05/30/2024    CREATININE 1.35 (H) 05/30/2024       Last LFTs:  Lab Results   Component Value Date    ALT 7 (L) 05/27/2024    AST 11 05/27/2024    ALKPHOS 59 05/27/2024    BILITOT 0.4 05/27/2024       Last coags:  Lab Results   Component Value Date    INR 1.3 (H) 05/27/2024    APTT 29 05/27/2024       Micro/culture data:  No results found for the last 90 days.      Imaging:  XR abdomen 1 view  Narrative: Interpreted By:  Moise Estrada and Baker Zachary   STUDY:  XR ABDOMEN 1 VIEW;  5/29/2024 5:45 pm      INDICATION:  Signs/Symptoms:Confirmation of Dobhoff placement.      COMPARISON:  None.      ACCESSION NUMBER(S):  [id number]      ORDERING CLINICIAN:  THOMAS SPEAR      FINDINGS:  Single AP view of the abdomen.      Enteric tube tip overlies the expected position of the gastric body.      Nonobstructive bowel gas pattern. Contrast material noted throughout  the large bowel. Limited evaluation of pneumoperitoneum on supine  imaging, however no gross evidence of free air is noted.      Visualized lungs are clear.      Osseous structures demonstrate no acute bony changes.      Impression: 1. Enteric tube tip overlies the expected position of the gastric  body.  2. Nonobstructive bowel gas pattern.      I personally reviewed the images/study and I agree with the findings  as stated by Dr. Guero Staton M.D. This study was interpreted at  [place], [place].      MACRO:  None      Signed by: Moise  Field 5/30/2024 7:29 AM  Dictation workstation:   [id number]         Assessment/Plan   Principal Problem:    Vomiting without nausea, unspecified vomiting type    Pilo Hanks is a 39 y.o. 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 He reports one month of worsening oral dysphagia. Patient swallowing could be functional, anatomic, neoplastic or neuromuscular. Likely a complication of recent radiation therapy. Primary Oncologist is Dr. K. Burkitt; Primary Rad Oncologist Dr. TRISHA Blackman.    Updates:  - At goal on tube feeds without GI upset.  - Will try IV Robinul for excess secretions today and transition back to oral Robinul with Levsin vs Pseudoephedrine.       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 Burkitt, and Dr. Blackman both aware of admission and presentation.  - Will consider temporary nutritional supplement options given completion of radiation.  - Pt has elected to have Dobhoff placed to assist in maintaining nutritional needs as his throat recovers.  - Robinul IV 0.4 mg q6 - Will trial IV Robinul for excess secretions today and transition back to oral Robinul with Levsin vs Pseudoephedrine    #Oral Candida  #C/f Candidal Esophagitis  - Magic Mouthwash  - Oral Fluconazole 200 daily (5/28-6/16)  - Cont PPI  - Cont Sucralfate    #DELON/CKD   Estimated Creatinine Clearance: 63.4 mL/min (A) (by C-G formula based on SCr of 1.35 mg/dL (H)). Baseline ~ 1.2-1.3  S/p 1L LR in ED, w/  Maintenance Fluids following while NPO.  :: UA without concern for infection  - DELON likely prerenal in etiology  - Encourage PO intake, monitor for refeeding    #Anemia  Likely 2/2 to chemotherapy on chronological review   :: Retic 1.5% (18.4% immature) RI: Hypo-proliferative    - Will CTM    #Gout  - c/w home Allopurinol 100mg daily, colchicine 1.2mg Q12H PRN (DELON likely 2/2 to prerenal etiology/hypovolemic dehydration; will monitor response to IVF and consider d'cing Colchicine as appropriate)      F: Replete PRN  E: Replete PRN  N: Regular Diet (Easy to chew/Thick Mild) + tube feeds  DVT Ppx: Heparin SubQ   GI Ppx: Pantoprazole    Access/Lines: PIV (Dooff 5/29- )  Mane None  Abx: Fluconazole   O2: None    Pain regimen: Tylenol   GI Laxative:  None    Code status: Full Code  Emergency contact: Fei Hanks (Wife) [phone]       Nilton Garcia MD  PGY-1 Internal Medicine

## 2024-06-01 ENCOUNTER — PHARMACY VISIT (OUTPATIENT)
Dept: PHARMACY | Facility: CLINIC | Age: 39
End: 2024-06-01
Payer: MEDICARE

## 2024-06-01 VITALS
HEIGHT: 68 IN | DIASTOLIC BLOOD PRESSURE: 87 MMHG | WEIGHT: 135.8 LBS | BODY MASS INDEX: 20.58 KG/M2 | RESPIRATION RATE: 16 BRPM | SYSTOLIC BLOOD PRESSURE: 118 MMHG | TEMPERATURE: 97.9 F | OXYGEN SATURATION: 97 % | HEART RATE: 86 BPM

## 2024-06-01 LAB
ALBUMIN SERPL BCP-MCNC: 3.8 G/DL (ref 3.4–5)
ANION GAP SERPL CALC-SCNC: 13 MMOL/L (ref 10–20)
BASOPHILS # BLD AUTO: 0.02 X10*3/UL (ref 0–0.1)
BASOPHILS NFR BLD AUTO: 0.6 %
BUN SERPL-MCNC: 25 MG/DL (ref 6–23)
CALCIUM SERPL-MCNC: 8.8 MG/DL (ref 8.6–10.6)
CHLORIDE SERPL-SCNC: 98 MMOL/L (ref 98–107)
CO2 SERPL-SCNC: 29 MMOL/L (ref 21–32)
CREAT SERPL-MCNC: 1.32 MG/DL (ref 0.5–1.3)
EGFRCR SERPLBLD CKD-EPI 2021: 70 ML/MIN/1.73M*2
EOSINOPHIL # BLD AUTO: 0.08 X10*3/UL (ref 0–0.7)
EOSINOPHIL NFR BLD AUTO: 2.2 %
ERYTHROCYTE [DISTWIDTH] IN BLOOD BY AUTOMATED COUNT: 13.1 % (ref 11.5–14.5)
GLUCOSE SERPL-MCNC: 109 MG/DL (ref 74–99)
HCT VFR BLD AUTO: 30.9 % (ref 41–52)
HGB BLD-MCNC: 10.3 G/DL (ref 13.5–17.5)
IMM GRANULOCYTES # BLD AUTO: 0.03 X10*3/UL (ref 0–0.7)
IMM GRANULOCYTES NFR BLD AUTO: 0.8 % (ref 0–0.9)
LYMPHOCYTES # BLD AUTO: 0.42 X10*3/UL (ref 1.2–4.8)
LYMPHOCYTES NFR BLD AUTO: 11.8 %
MAGNESIUM SERPL-MCNC: 1.89 MG/DL (ref 1.6–2.4)
MCH RBC QN AUTO: 28.9 PG (ref 26–34)
MCHC RBC AUTO-ENTMCNC: 33.3 G/DL (ref 32–36)
MCV RBC AUTO: 87 FL (ref 80–100)
MONOCYTES # BLD AUTO: 0.54 X10*3/UL (ref 0.1–1)
MONOCYTES NFR BLD AUTO: 15.1 %
NEUTROPHILS # BLD AUTO: 2.48 X10*3/UL (ref 1.2–7.7)
NEUTROPHILS NFR BLD AUTO: 69.5 %
NRBC BLD-RTO: 0 /100 WBCS (ref 0–0)
PHOSPHATE SERPL-MCNC: 2.7 MG/DL (ref 2.5–4.9)
PLATELET # BLD AUTO: 204 X10*3/UL (ref 150–450)
POTASSIUM SERPL-SCNC: 3.8 MMOL/L (ref 3.5–5.3)
RBC # BLD AUTO: 3.56 X10*6/UL (ref 4.5–5.9)
SODIUM SERPL-SCNC: 136 MMOL/L (ref 136–145)
WBC # BLD AUTO: 3.6 X10*3/UL (ref 4.4–11.3)

## 2024-06-01 PROCEDURE — 2500000004 HC RX 250 GENERAL PHARMACY W/ HCPCS (ALT 636 FOR OP/ED)

## 2024-06-01 PROCEDURE — 2500000001 HC RX 250 WO HCPCS SELF ADMINISTERED DRUGS (ALT 637 FOR MEDICARE OP): Performed by: STUDENT IN AN ORGANIZED HEALTH CARE EDUCATION/TRAINING PROGRAM

## 2024-06-01 PROCEDURE — 85025 COMPLETE CBC W/AUTO DIFF WBC: CPT

## 2024-06-01 PROCEDURE — 2500000001 HC RX 250 WO HCPCS SELF ADMINISTERED DRUGS (ALT 637 FOR MEDICARE OP)

## 2024-06-01 PROCEDURE — 99239 HOSP IP/OBS DSCHRG MGMT >30: CPT

## 2024-06-01 PROCEDURE — 2500000004 HC RX 250 GENERAL PHARMACY W/ HCPCS (ALT 636 FOR OP/ED): Performed by: STUDENT IN AN ORGANIZED HEALTH CARE EDUCATION/TRAINING PROGRAM

## 2024-06-01 PROCEDURE — 36415 COLL VENOUS BLD VENIPUNCTURE: CPT

## 2024-06-01 PROCEDURE — 2500000002 HC RX 250 W HCPCS SELF ADMINISTERED DRUGS (ALT 637 FOR MEDICARE OP, ALT 636 FOR OP/ED)

## 2024-06-01 PROCEDURE — 82565 ASSAY OF CREATININE: CPT

## 2024-06-01 PROCEDURE — C9113 INJ PANTOPRAZOLE SODIUM, VIA: HCPCS | Performed by: STUDENT IN AN ORGANIZED HEALTH CARE EDUCATION/TRAINING PROGRAM

## 2024-06-01 PROCEDURE — 83735 ASSAY OF MAGNESIUM: CPT

## 2024-06-01 RX ORDER — ONDANSETRON HYDROCHLORIDE 8 MG/1
8 TABLET, FILM COATED ORAL EVERY 8 HOURS PRN
Qty: 30 TABLET | Refills: 5 | Status: SHIPPED | OUTPATIENT
Start: 2024-06-01 | End: 2024-07-31

## 2024-06-01 RX ADMIN — PANTOPRAZOLE SODIUM 40 MG: 40 INJECTION, POWDER, FOR SOLUTION INTRAVENOUS at 09:13

## 2024-06-01 RX ADMIN — SUCRALFATE 1 G: 1 SUSPENSION ORAL at 11:55

## 2024-06-01 RX ADMIN — ALLOPURINOL 100 MG: 100 TABLET ORAL at 09:14

## 2024-06-01 RX ADMIN — OXYCODONE HYDROCHLORIDE AND ACETAMINOPHEN 1000 MG: 500 TABLET ORAL at 09:14

## 2024-06-01 RX ADMIN — SUCRALFATE 1 G: 1 SUSPENSION ORAL at 06:33

## 2024-06-01 RX ADMIN — GABAPENTIN 800 MG: 250 SOLUTION ORAL at 09:13

## 2024-06-01 RX ADMIN — MAGNESIUM OXIDE TAB 400 MG (241.3 MG ELEMENTAL MG) 400 MG: 400 (241.3 MG) TAB at 09:14

## 2024-06-01 RX ADMIN — SUCRALFATE 1 G: 1 SUSPENSION ORAL at 01:07

## 2024-06-01 RX ADMIN — THIAMINE HCL TAB 100 MG 100 MG: 100 TAB at 09:14

## 2024-06-01 RX ADMIN — PSYLLIUM HUSK 1 PACKET: 3.4 POWDER ORAL at 09:14

## 2024-06-01 RX ADMIN — GLYCOPYRROLATE 0.4 MG: 0.2 INJECTION, SOLUTION INTRAMUSCULAR; INTRAVENOUS at 09:13

## 2024-06-01 RX ADMIN — GLYCOPYRROLATE 0.4 MG: 0.2 INJECTION, SOLUTION INTRAMUSCULAR; INTRAVENOUS at 01:07

## 2024-06-01 ASSESSMENT — COGNITIVE AND FUNCTIONAL STATUS - GENERAL
MOBILITY SCORE: 24
DAILY ACTIVITIY SCORE: 24

## 2024-06-01 ASSESSMENT — PAIN - FUNCTIONAL ASSESSMENT: PAIN_FUNCTIONAL_ASSESSMENT: 0-10

## 2024-06-01 ASSESSMENT — PAIN SCALES - GENERAL: PAINLEVEL_OUTOF10: 0 - NO PAIN

## 2024-06-01 NOTE — PROGRESS NOTES
Spoke with bedside RN who is giving the patient the supplies needed for boost bolus and syringes for the weekend. Spoke with [facility] and INTAKE COMPLETE AND PATIENT PROCESSED FOR SOC 24-48 HOURS POST DC PLANNED FOR 6/1/2024. Team updated. Patient discharging.    Rosmery Hampton RN

## 2024-06-01 NOTE — NURSING NOTE
6/1/24 0915:     RN educated patient on how to administer medications and water flushes through dobhoff. Pt actively participated, demonstrating to RN how to use pill , syringe, and how to administer the medication via gravity drainage. No questions or concerns expressed at this time.     RUDI Odonnell RN

## 2024-06-01 NOTE — DISCHARGE SUMMARY
Discharge Diagnosis  Dysphagia    Issues Requiring Follow-Up  - Follow up with Dr. Burkitt and Dr. Blackman (Oncology and Radiation Onc respectively). Referrals placed.  - Home care referral placed for tube feeds and set to begin Monday 6/3/24.    Test Results Pending At Discharge  Pending Labs       No current pending labs.            Hospital Course  39 y.o. 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 He reports one month of worsening oral dysphagia. Patient swallowing could be functional, anatomic, neoplastic or neuromuscular. Likely a complication of recent radiation therapy. Primary Oncologist is Dr. K. Burkitt; Primary Rad Oncologist Dr. TRISHA Blackman. Presents to  5/27 with progressive dysphagia and odynophagia and overall concern for worsening protein malnutrition. On speech evaluation, cleared for Soft solids with mildy thickened liquids. Candida noted in oropharynx on exam raising concern for candidal esophagitis vs radiation esophagitis or a mixed picture. Started on PPI, Sucralfate, and oral Fluconazole for this. The decision for dobhoff placement vs PEG placement was weight with the help of his primary oncologist and radiation oncologist. Given there remained concern for adequate PO intake and he had completed his radiation sessions it was determined a dobhoff is the best option given the potential for recovery. He noted excess secretion which induce nausea which we have supported with Zofran, Robinul, and Sudafed.      Per Speech Language Pathology:  Recommendations:  Soft diet with mildly thick liquids  Upright for all PO intake  Remain upright for 20-30 min after eating  Small bites/sips  Pills per pt preference     -Further recommend small frequent meals as to not overwhelm pt as well as improve easy of swallow.   -Use of BMX prior to each meal to improved pain management.   -Free water protocol between meals.   Goal:    Pt. will tolerate least restrictive diet without overt s/s of aspiration with 100% accuracy.  Pt will utilized safe swallow strategies independently during meals       Pertinent Physical Exam At Time of Discharge  Physical Exam  Constitutional:       General: He is not in acute distress.     Appearance: He is not ill-appearing.   HENT:      Head:      Comments: Several small white lesions in clusters in the mouth. Roof of mouth also white and easily scrapes away with pressure.     Mouth/Throat:      Mouth: Mucous membranes are moist.   Eyes:      General: No scleral icterus.     Extraocular Movements: Extraocular movements intact.   Neck:      Comments: Visible skin hyperpigmentation of the neck. (Pt noted that it presented following start of radiation).  Cardiovascular:      Rate and Rhythm: Normal rate and regular rhythm.      Pulses: Normal pulses.      Heart sounds: Normal heart sounds.   Pulmonary:      Effort: Pulmonary effort is normal.      Breath sounds: Normal breath sounds. No wheezing, rhonchi or rales.   Abdominal:      General: Abdomen is flat. Bowel sounds are normal. There is no distension.      Palpations: Abdomen is soft.      Tenderness: There is no abdominal tenderness.   Musculoskeletal:         General: No swelling.   Skin:     General: Skin is warm and dry.      Coloration: Skin is not jaundiced.   Neurological:      General: No focal deficit present.      Mental Status: He is oriented to person, place, and time.      Home Medications     Medication List      START taking these medications     diphenhydramine/Maalox/lidocaine (Magic Mouthwash) - Compounded -   Outpatient; Swish and swallow 10 mL by mouth 3 times a day as needed for   Pain.   fluconazole 40 mg/mL suspension; Commonly known as: Diflucan; Take 5 mL   (200 mg) by mouth once daily for 18 doses.   glycopyrrolate 2 mg tablet; Commonly known as: Robinul; Take 1 tablet (2   mg) by mouth 3 times a day.   pantoprazole 40 mg EC tablet;  Commonly known as: ProtoNix; Take 1 tablet   (40 mg) by mouth once daily. Do not crush, chew, or split.   pseudoephedrine 30 mg tablet; Commonly known as: Sudafed; Take 1 tablet   (30 mg) by mouth every 4 hours if needed for congestion.   psyllium husk (aspartame) 3.4 gram/5.8 gram powder; Take 1 packet by   mouth once daily. Do not fill before June 1, 2024.   sucralfate 100 mg/mL suspension; Commonly known as: Carafate; Take 10 mL   (1 g) by mouth every 6 hours.   thiamine 100 mg tablet; Commonly known as: Vitamin B-1; Take 1 tablet   (100 mg) by mouth once daily for 5 doses.     CONTINUE taking these medications     allopurinol 100 mg tablet; Commonly known as: Zyloprim; Take 1 tablet   (100 mg) by mouth once daily.   ascorbic acid 1,000 mg tablet; Commonly known as: Vitamin C   baclofen 5 mg tablet; Commonly known as: Lioresal; Take 1 tablet (5 mg)   by mouth 3 times a day for 20 days.   colchicine 0.6 mg tablet; Use 2 tbs at the sign of gout PO can repeat in   1 hr if still with pain X 1   gabapentin 800 mg tablet; Commonly known as: Neurontin; Take 1 tablet   (800 mg) by mouth 3 times a day.   ondansetron 8 mg tablet; Commonly known as: Zofran; Take 1 tablet (8 mg)   by mouth every 8 hours if needed for nausea or vomiting.     STOP taking these medications     loperamide 2 mg capsule; Commonly known as: Imodium   magnesium oxide 400 mg (241.3 mg magnesium) tablet; Commonly known as:   Mag-Ox   oxyCODONE 5 mg immediate release tablet; Commonly known as: Roxicodone   prochlorperazine 10 mg tablet; Commonly known as: Compazine   triamcinolone 0.1 % cream; Commonly known as: Kenalog       Outpatient Follow-Up  Future Appointments   Date Time Provider Department [place]   6/6/2024  9:00 AM Rosina Fitzgerald PA-C [id number] [facility]   6/6/2024  9:30 AM DOMINGA Giron-CNP [id number] [facility]   9/27/2024  9:00 AM Ruth Villa DO [id number] [facility]   11/19/2024  1:00 PM Shaila Augustine, APRN-CNP [id number]  Academic       Nilton Garcia MD  Internal Medicine PGY-I

## 2024-06-01 NOTE — NURSING NOTE
6/1/24 1525:    Pt discharge complete. IV removed, catheter intact. Pt sent home with jose feeding tube in R-nare @ 55. Pt sent with 4 cartons of boost, 5 syringes, and other miscellaneous supplies needed for home care of dobhoff. Pt sent home with meds to beds medications and RN reviewed each one with patient. All pt belonging sent home with patient. Discharge instructions printed and reviewed with pt, no questions or concerns. Vitals stable at time of discharge. Pt ambulated to [facility] to discharge to home via private vehicle.     RUDI Odonnell RN

## 2024-06-01 NOTE — DOCUMENTATION CLARIFICATION NOTE
"    PATIENT:               CHRISTIANO CASTELLANOS  ACCT #:                  [id number]  MRN:                       [id number]  :                       1985  ADMIT DATE:       2024 2:58 PM  DISCH DATE:  RESPONDING PROVIDER #:        [id number]          PROVIDER RESPONSE TEXT:    Severe Protein Calorie Malnutrition    CDI QUERY TEXT:    Clarification        Instruction:    Based on your assessment of the patient and the clinical information, please provide the requested documentation by clicking on the appropriate radio button and enter any additional information if prompted.    Question: Please further clarify this patient nutritional status as    When answering this query, please exercise your independent professional judgment. The fact that a question is being asked, does not imply that any particular answer is desired or expected.    The patient's clinical indicators include:  Clinical Information: 38 yo with PMHx of Squamous Cell Carcinoma of L oropharynx, on recent chemo/RT, CKD, anemia, gout, presents with worsening oral dysphagia, n/v.    Clinical Indicators:  Consult- RDN  at 1441:  \"BMI: 20.65  Malnutrition Diagnosis: Severe malnutrition related to acute disease or injury -h/o oral CA s/p recent radiation now with n/v and dysphagia  As Evidenced by: pt consuming less than 50 percent estimated nutritional needs x greater than 5 days with 10 percent wt loss in about 1 week/17 percent wt loss in about 1 month PTA    Additional Assessment Information: Pt may be at risk for refeeding, considering severe malnutrition. D/t use of small-bore feeding tube/temporary access, RDN to recommend use of Boost VHC for TF. See recs below. Did also discuss possible addition of oral nutrition supplements, pt declined any shakes, such as Boost VHC but was agreeable to trial Gelatein Plus supplement.?    PN- Oncology  at 0724:  \"Malnourishment  Nausea/Vomiting/Inability to Tolerate PO Intake  - Consulted Nutrition; appreciate " "recs  - Pt has elected to have Dobhoff placed to assist in maintaining nutritional needs as his throat recovers\"      Treatment:  Per RDN consult 5/29:  ?Nutrition Interventions:  -Interventions: Enteral intake  -Enteral Intake: Other--begin TF once enteral access has been obtained  -Goal: TF of Boost VHC at goal rate of 40ml/hr?    -Dobhoff placed    Risk Factors:  -Squamous Cell Carcinoma of L oropharynx, on recent chemo/RT  -oral dysphagia  -n/v  Options provided:  -- Severe Protein Calorie Malnutrition  -- Protein Calorie Malnutrition, Please specify severity  -- Other - I will add my own diagnosis  -- Refer to Clinical Documentation Reviewer    Query created by: Nicole Nicole on 5/30/2024 5:36 PM      Electronically signed by:  IVON HAWK MD 6/1/2024 7:35 AM          "

## 2024-06-01 NOTE — CARE PLAN
The clinical goals for the shift include Pt will remain HDS and VSS throughout shift 6/1/24 by 0700.      Problem: Skin  Goal: Decreased wound size/increased tissue granulation at next dressing change  Outcome: Progressing  Goal: Participates in plan/prevention/treatment measures  Outcome: Progressing  Goal: Prevent/manage excess moisture  Outcome: Progressing  Goal: Prevent/minimize sheer/friction injuries  Outcome: Progressing  Goal: Promote/optimize nutrition  Outcome: Progressing  Goal: Promote skin healing  Outcome: Progressing     Problem: Pain  Goal: My pain/discomfort is manageable  Outcome: Progressing     Problem: Safety  Goal: Patient will be injury free during hospitalization  Outcome: Progressing  Goal: I will remain free of falls  Outcome: Progressing     Problem: Daily Care  Goal: Daily care needs are met  Outcome: Progressing     Problem: Psychosocial Needs  Goal: Demonstrates ability to cope with hospitalization/illness  Outcome: Progressing  Goal: Collaborate with me, my family, and caregiver to identify my specific goals  Outcome: Progressing     Problem: Discharge Barriers  Goal: My discharge needs are met  Outcome: Progressing     Problem: Pain - Adult  Goal: Verbalizes/displays adequate comfort level or baseline comfort level  Outcome: Progressing     Problem: Safety - Adult  Goal: Free from fall injury  Outcome: Progressing     Problem: Discharge Planning  Goal: Discharge to home or other facility with appropriate resources  Outcome: Progressing     Problem: Chronic Conditions and Co-morbidities  Goal: Patient's chronic conditions and co-morbidity symptoms are monitored and maintained or improved  Outcome: Progressing

## 2024-06-02 ENCOUNTER — HOME CARE VISIT (OUTPATIENT)
Dept: HOME HEALTH SERVICES | Facility: HOME HEALTH | Age: 39
End: 2024-06-02

## 2024-06-03 ENCOUNTER — APPOINTMENT (OUTPATIENT)
Dept: RADIATION ONCOLOGY | Facility: HOSPITAL | Age: 39
End: 2024-06-03
Payer: COMMERCIAL

## 2024-06-04 ENCOUNTER — APPOINTMENT (OUTPATIENT)
Dept: RADIATION ONCOLOGY | Facility: HOSPITAL | Age: 39
End: 2024-06-04
Payer: COMMERCIAL

## 2024-06-05 ENCOUNTER — TELEPHONE (OUTPATIENT)
Dept: RADIATION ONCOLOGY | Facility: HOSPITAL | Age: 39
End: 2024-06-05

## 2024-06-05 ENCOUNTER — APPOINTMENT (OUTPATIENT)
Dept: HEMATOLOGY/ONCOLOGY | Facility: HOSPITAL | Age: 39
End: 2024-06-05
Payer: COMMERCIAL

## 2024-06-05 NOTE — PROGRESS NOTES
Radiation Oncology Treatment Summary    Patient Name:  Pilo Hanks  MRN:  [id number]  :  1985    Radiation Oncologist: Raquel Blackman MD   Referring Provider: No ref. provider found  Primary Care Provider: Ruth Villa DO    Brief History: Pilo Hanks is a 39 y.o. male with T1N2 BOT p16+.  The patient completed radiotherapy as outlined below.    Radiation Treatment Summary:    Radiation Treatments       Active   No active radiation treatments to show.     Completed   BOT + nodes (Started on 2024)   Most recent fraction: 200 cGy given on 2024   Total given: 7,000 cGy / 7,000 cGy  (35 of 35 fractions)   Elapsed Days: 38   Technique: VMAT                   Concurrent Chemotherapy:  CISplatin with Concurrent Radiation, 21 Day Cycles - Head and Neck   Treatment goal [No plan goal]   Treatment line [No plan line of treatment]   Status Active   Start Date 2024   End Date 2024 (Planned)   Treatment Medications methylPREDNISolone sod succinate (SOLU-Medrol) 40 mg/mL injection 40 mg, 40 mg, intravenous, As needed, 3 of 3 cycles    CISplatin (Platinol) 189 mg in sodium chloride 0.9% 689 mL IV, 100 mg/m2 = 189 mg, intravenous, Once, 3 of 3 cycles  Administration: 189 mg (2024), 189 mg (2024)          CTCAE Toxicity Overview:   Toxicity Assessment          2024    12:30 2024    10:06 2024    10:11 2024    10:23 2024    09:09 2024    10:00   Toxicity Assessment   Adverse Events Reviewed (WDL) No (Exceptions to WDL) No (Exceptions to WDL) No (Exceptions to WDL) No (Exceptions to WDL) No (Exceptions to WDL) No (Exceptions to WDL)   Treatment  and neck Head and neck Head and neck Head and neck Head and neck Head and neck   Anorexia Grade 0 Grade 0       loss of .2lbs since last week, PO intake still normal Grade 1       increase in painful swallowing, loss of 1.5lbs since last week. BMX? Grade 2       loss of 7.25lbs since last week, Dietician following  Grade 2       loss of 3lbs since last week, dietician following. loss of 10% since start reached Grade 2       last 2 lbs since last week, encouraged high calorie diet   Dehydration Grade 1       Pt received IV hydration today Grade 0       BP WNL Grade 1       Ortho positive. fluids encouraged. Grade 1       getting IV hydration, PO intake trying to keep up Grade 0       intake of fluids normal, BP normal Grade 0   Dermatitis Radiation Grade 0       creams at home Grade 0       creams at home Grade 1       Some slight redness/darkening. Creams provided and at home Grade 1       skin darkening, Creams at home Grade 1       dry skin, creams at home Grade 1       darkened skin around neck - using aquaphor daily   Diarrhea  Grade 0 Grade 0       Constipation at times, Mirilax at home  Grade 0 Grade 0   Fatigue Grade 0 Grade 1       Tired over the weekend, Rest and activity balance Grade 1       Not very tired but energy level is lower. Rest and activity balance Grade 1       Slept more than normal over the weekend. Rest and activity balance Grade 2       Tired nearly every day, rest and activity balance Grade 2   Nausea Grade 0 Grade 0 Grade 0 Grade 1       over the past weekend, Zofran helping Grade 0 Grade 0   Pain Grade 1       increase in Gabapentin dose Grade 1       Back of mouth/throat L side, Gabapentin at home Grade 1       Throat/Mouth Grade 1       Mouth/throat discomfort Grade 1       throat/neck Grade 1       5 out of 10 pain in throat with swallowing - using gabapentin and oxy Q8 hours   Vomiting Grade 0 Grade 0 Grade 0 Grade 1       episodes over the weekend, Meds at home Grade 0       thick saliva gagged and produced emesis Grade 1       vomitted due to thick mucus 2 times in last 24 hours   Dysphagia Grade 1       dry mouth Grade 1       increase in painfull swallowing Grade 1       increase in pain/possible aspiration. SLP following Grade 1       thick mucous, worried about choking Grade 1        painful at times, visit with SLP today Grade 1       eating softer foods,   Mucositis Oral Grade 0       Glutamine at home Grade 0       Glutamine at home Grade 1       Sore present with general redness, Glutamine at home Grade 1       redness and sores present, Glutamine at home Grade 1       redness, with one sore visible, Glutamine at home Grade 1       sore in back of throat, using glutamine and salt and soda mixes   Hearing Impaired  Grade 0       Dry Mouth Grade 1       throughout the day, pt sipping water helps. Grade 1       Sipping water, worse in the morning Grade 1       Worse in the AM, Gultamine and Humidifier encouraged Grade 1       Worse is the AM, Glutamine at home Grade 2       thick mucous, worse in the AM. Glutamine and Salt/soda at home Grade 2       thick mucus and dry mouth - using glutamine rinses and salt and soda rinses   Oral Pain Grade 0 Grade 0 Grade 1       increase in burning while eating Grade 1       some sores, Gabapentin at home Grade 0 Grade 0   Aspiration Grade 0 Grade 0 Grade 0 Grade 0 Grade 0 Grade 0   Hoarseness Grade 0 Grade 0 Grade 0 Grade 0 Grade 0 Grade 1       occasional   Voice Alteration Grade 0 Grade 0 Grade 0 Grade 0 Grade 0 Grade 0     Patient Disposition:   Future Appointments       Date / Time Provider Department Dept Phone    6/6/2024 9:00 AM (Arrive by 8:45 AM) Rosina Fitzgerald PA-C [facility] [phone]    6/6/2024 9:30 AM DOMINGA Giron-CNP [facility] [phone]    9/27/2024 9:00 AM (Arrive by 8:45 AM) Ruth Villa DO [facility] [phone]    11/19/2024 1:00 PM (Arrive by 12:45 PM) DOMINGA Russell-CNP [facility] [phone]

## 2024-06-06 ENCOUNTER — LAB (OUTPATIENT)
Dept: LAB | Facility: HOSPITAL | Age: 39
End: 2024-06-06
Payer: COMMERCIAL

## 2024-06-06 ENCOUNTER — NUTRITION (OUTPATIENT)
Dept: HEMATOLOGY/ONCOLOGY | Facility: HOSPITAL | Age: 39
End: 2024-06-06

## 2024-06-06 ENCOUNTER — HOSPITAL ENCOUNTER (OUTPATIENT)
Dept: RADIATION ONCOLOGY | Facility: HOSPITAL | Age: 39
Setting detail: RADIATION/ONCOLOGY SERIES
Discharge: HOME | End: 2024-06-06
Payer: COMMERCIAL

## 2024-06-06 ENCOUNTER — INFUSION (OUTPATIENT)
Dept: HEMATOLOGY/ONCOLOGY | Facility: HOSPITAL | Age: 39
End: 2024-06-06
Payer: COMMERCIAL

## 2024-06-06 ENCOUNTER — OFFICE VISIT (OUTPATIENT)
Dept: HEMATOLOGY/ONCOLOGY | Facility: HOSPITAL | Age: 39
End: 2024-06-06
Payer: COMMERCIAL

## 2024-06-06 VITALS
TEMPERATURE: 98.4 F | RESPIRATION RATE: 18 BRPM | WEIGHT: 139.77 LBS | DIASTOLIC BLOOD PRESSURE: 70 MMHG | SYSTOLIC BLOOD PRESSURE: 110 MMHG | BODY MASS INDEX: 21.25 KG/M2 | HEART RATE: 88 BPM | OXYGEN SATURATION: 99 %

## 2024-06-06 VITALS
RESPIRATION RATE: 18 BRPM | BODY MASS INDEX: 21.25 KG/M2 | TEMPERATURE: 98.4 F | WEIGHT: 139.77 LBS | SYSTOLIC BLOOD PRESSURE: 110 MMHG | OXYGEN SATURATION: 99 % | DIASTOLIC BLOOD PRESSURE: 70 MMHG | HEART RATE: 88 BPM

## 2024-06-06 DIAGNOSIS — C10.9 MALIGNANT NEOPLASM OF OROPHARYNX (MULTI): ICD-10-CM

## 2024-06-06 DIAGNOSIS — N17.9 AKI (ACUTE KIDNEY INJURY) (CMS-HCC): ICD-10-CM

## 2024-06-06 DIAGNOSIS — R13.10 ODYNOPHAGIA: ICD-10-CM

## 2024-06-06 DIAGNOSIS — K12.33 MUCOSITIS DUE TO RADIATION THERAPY: ICD-10-CM

## 2024-06-06 DIAGNOSIS — K59.00 CONSTIPATION, UNSPECIFIED CONSTIPATION TYPE: ICD-10-CM

## 2024-06-06 DIAGNOSIS — Z85.89 ENCOUNTER FOR FOLLOW-UP SURVEILLANCE OF HEAD AND NECK CANCER: ICD-10-CM

## 2024-06-06 DIAGNOSIS — Z08 ENCOUNTER FOR FOLLOW-UP SURVEILLANCE OF HEAD AND NECK CANCER: ICD-10-CM

## 2024-06-06 DIAGNOSIS — C10.9 MALIGNANT NEOPLASM OF OROPHARYNX (MULTI): Primary | ICD-10-CM

## 2024-06-06 DIAGNOSIS — R13.12 OROPHARYNGEAL DYSPHAGIA: Primary | ICD-10-CM

## 2024-06-06 DIAGNOSIS — R13.10 DYSPHAGIA, UNSPECIFIED TYPE: ICD-10-CM

## 2024-06-06 LAB
ALBUMIN SERPL BCP-MCNC: 4.2 G/DL (ref 3.4–5)
ALP SERPL-CCNC: 45 U/L (ref 33–120)
ALT SERPL W P-5'-P-CCNC: 15 U/L (ref 10–52)
ANION GAP SERPL CALC-SCNC: 13 MMOL/L (ref 10–20)
AST SERPL W P-5'-P-CCNC: 16 U/L (ref 9–39)
BASOPHILS # BLD AUTO: 0.01 X10*3/UL (ref 0–0.1)
BASOPHILS NFR BLD AUTO: 0.3 %
BILIRUB SERPL-MCNC: 0.3 MG/DL (ref 0–1.2)
BUN SERPL-MCNC: 25 MG/DL (ref 6–23)
CALCIUM SERPL-MCNC: 9.2 MG/DL (ref 8.6–10.3)
CHLORIDE SERPL-SCNC: 98 MMOL/L (ref 98–107)
CO2 SERPL-SCNC: 30 MMOL/L (ref 21–32)
CREAT SERPL-MCNC: 1.47 MG/DL (ref 0.5–1.3)
EGFRCR SERPLBLD CKD-EPI 2021: 61 ML/MIN/1.73M*2
EOSINOPHIL # BLD AUTO: 0.06 X10*3/UL (ref 0–0.7)
EOSINOPHIL NFR BLD AUTO: 1.7 %
ERYTHROCYTE [DISTWIDTH] IN BLOOD BY AUTOMATED COUNT: 14.1 % (ref 11.5–14.5)
GLUCOSE SERPL-MCNC: 96 MG/DL (ref 74–99)
HCT VFR BLD AUTO: 29.7 % (ref 41–52)
HGB BLD-MCNC: 9.9 G/DL (ref 13.5–17.5)
IMM GRANULOCYTES # BLD AUTO: 0.02 X10*3/UL (ref 0–0.7)
IMM GRANULOCYTES NFR BLD AUTO: 0.6 % (ref 0–0.9)
LYMPHOCYTES # BLD AUTO: 0.29 X10*3/UL (ref 1.2–4.8)
LYMPHOCYTES NFR BLD AUTO: 8 %
MAGNESIUM SERPL-MCNC: 1.89 MG/DL (ref 1.6–2.4)
MCH RBC QN AUTO: 29 PG (ref 26–34)
MCHC RBC AUTO-ENTMCNC: 33.3 G/DL (ref 32–36)
MCV RBC AUTO: 87 FL (ref 80–100)
MONOCYTES # BLD AUTO: 0.41 X10*3/UL (ref 0.1–1)
MONOCYTES NFR BLD AUTO: 11.4 %
NEUTROPHILS # BLD AUTO: 2.82 X10*3/UL (ref 1.2–7.7)
NEUTROPHILS NFR BLD AUTO: 78 %
NRBC BLD-RTO: 0 /100 WBCS (ref 0–0)
PLATELET # BLD AUTO: 204 X10*3/UL (ref 150–450)
POTASSIUM SERPL-SCNC: 4.2 MMOL/L (ref 3.5–5.3)
PROT SERPL-MCNC: 7.3 G/DL (ref 6.4–8.2)
RBC # BLD AUTO: 3.41 X10*6/UL (ref 4.5–5.9)
SODIUM SERPL-SCNC: 137 MMOL/L (ref 136–145)
WBC # BLD AUTO: 3.6 X10*3/UL (ref 4.4–11.3)

## 2024-06-06 PROCEDURE — 2500000004 HC RX 250 GENERAL PHARMACY W/ HCPCS (ALT 636 FOR OP/ED): Performed by: STUDENT IN AN ORGANIZED HEALTH CARE EDUCATION/TRAINING PROGRAM

## 2024-06-06 PROCEDURE — 99215 OFFICE O/P EST HI 40 MIN: CPT

## 2024-06-06 PROCEDURE — 99215 OFFICE O/P EST HI 40 MIN: CPT | Performed by: STUDENT IN AN ORGANIZED HEALTH CARE EDUCATION/TRAINING PROGRAM

## 2024-06-06 PROCEDURE — 84075 ASSAY ALKALINE PHOSPHATASE: CPT

## 2024-06-06 PROCEDURE — 85025 COMPLETE CBC W/AUTO DIFF WBC: CPT

## 2024-06-06 PROCEDURE — 36415 COLL VENOUS BLD VENIPUNCTURE: CPT

## 2024-06-06 PROCEDURE — 1036F TOBACCO NON-USER: CPT | Performed by: STUDENT IN AN ORGANIZED HEALTH CARE EDUCATION/TRAINING PROGRAM

## 2024-06-06 PROCEDURE — 83735 ASSAY OF MAGNESIUM: CPT

## 2024-06-06 PROCEDURE — 84450 TRANSFERASE (AST) (SGOT): CPT

## 2024-06-06 PROCEDURE — 96360 HYDRATION IV INFUSION INIT: CPT | Mod: INF

## 2024-06-06 RX ORDER — HEPARIN SODIUM,PORCINE/PF 10 UNIT/ML
50 SYRINGE (ML) INTRAVENOUS AS NEEDED
OUTPATIENT
Start: 2024-06-06

## 2024-06-06 RX ORDER — HEPARIN 100 UNIT/ML
500 SYRINGE INTRAVENOUS AS NEEDED
OUTPATIENT
Start: 2024-06-06

## 2024-06-06 RX ADMIN — SODIUM CHLORIDE 1000 ML: 9 INJECTION, SOLUTION INTRAVENOUS at 10:49

## 2024-06-06 ASSESSMENT — ENCOUNTER SYMPTOMS
LEG SWELLING: 0
DEPRESSION: 0
NECK STIFFNESS: 0
ARTHRALGIAS: 0
HEMATURIA: 0
WHEEZING: 0
ABDOMINAL DISTENTION: 0
CONSTIPATION: 1
LOSS OF SENSATION IN FEET: 0
COUGH: 1
SORE THROAT: 1
BACK PAIN: 0
DIARRHEA: 0
BLOOD IN STOOL: 0
NUMBNESS: 0
CHILLS: 0
FATIGUE: 1
HEADACHES: 0
DIFFICULTY URINATING: 0
EXTREMITY WEAKNESS: 1
SEIZURES: 0
DIZZINESS: 0
SHORTNESS OF BREATH: 0
NECK PAIN: 0
NAUSEA: 0
SPEECH DIFFICULTY: 0
EYE PROBLEMS: 0
CONFUSION: 0
OCCASIONAL FEELINGS OF UNSTEADINESS: 0
VOMITING: 0
TROUBLE SWALLOWING: 1
NERVOUS/ANXIOUS: 0
ABDOMINAL PAIN: 0

## 2024-06-06 ASSESSMENT — PAIN SCALES - GENERAL
PAINLEVEL: 3
PAINLEVEL: 0-NO PAIN

## 2024-06-06 ASSESSMENT — COLUMBIA-SUICIDE SEVERITY RATING SCALE - C-SSRS
6. HAVE YOU EVER DONE ANYTHING, STARTED TO DO ANYTHING, OR PREPARED TO DO ANYTHING TO END YOUR LIFE?: NO
2. HAVE YOU ACTUALLY HAD ANY THOUGHTS OF KILLING YOURSELF?: NO
1. IN THE PAST MONTH, HAVE YOU WISHED YOU WERE DEAD OR WISHED YOU COULD GO TO SLEEP AND NOT WAKE UP?: NO

## 2024-06-06 ASSESSMENT — PATIENT HEALTH QUESTIONNAIRE - PHQ9
1. LITTLE INTEREST OR PLEASURE IN DOING THINGS: NOT AT ALL
2. FEELING DOWN, DEPRESSED OR HOPELESS: NOT AT ALL
SUM OF ALL RESPONSES TO PHQ9 QUESTIONS 1 AND 2: 0

## 2024-06-06 NOTE — PROGRESS NOTES
Radiation Oncology Follow-Up    Patient Name:  Pilo Hanks  MRN:  [id number]  :  1985    Referring Provider: Raquel Blackman MD  Primary Care Provider: Ruth Villa DO  Care Team: Patient Care Team:  Ruth Villa DO as PCP - General  [facility]  Raquel Blackman MD as Radiation Oncologist (Radiation Oncology)  Kyunghee Burkitt, DO as Consulting Physician (Hematology and Oncology)  Rosina Fitzgerald PA-C as Physician Assistant (Hematology and Oncology)  Mary Ellsworth RN as Registered Nurse (Hematology and Oncology)    Date of Service: 2024    Diagnosis: Squamous Cell Carcinoma of left base of tongue, P16+  Staging: Stage II, T1N2M0    Sites of Disease  Left BOT  B/L cervical LNs     Oncologic Issues     ONCOLOGIC HISTORY  - 2024: Presented to PCP with mass in left tonsillar area. Referred to ENT  - 3/15/24: In office biopsy of left BOT w/ Dr Cruz.  Path showed p16 positive SCC  - 3/13/29/24: CT neck and chest showed a soft tissue mass within the left oropharynx centered in the anterior tonsillar pillar/left base of tongue, measures approximately 1.8 x 1.4cm.  - 24: PET/CT showed multiple hypermetabolic bilateral multilevel cervical lymphadenopathy, largest of which located at left cervical level 2, ~1.2cm.  - 24 HNTB: Rec surgical resection vs chemoRT vs clinical trials , [id number]    Treatment:   Chemoradiation (HD Cisplatin and VMAT-- 70Gy in 35 fx) ending on 24.     SUBJECTIVE  History of Present Illness:  Pilo Hanks is a 39 y.o. male who was previously seen at the [facility] Department of Radiation Oncology for SCC of the left BOT followed by Chemoradiation (HD Cisplatin and VMAT-- 70Gy in 35 fx) ending on 24.  Patient with recent hospitalization for dysphagia and malnutrition.  Saw SLP and was cleared for Soft solids with mildy thickened liquids. Candida noted in oropharynx on exam raising concern for candidal  "esophagitis vs radiation esophagitis or a mixed picture. Started on PPI, Sucralfate, and oral Fluconazole.  A Dobhoff was also placed, as a temporary measure to provide nutritional support.   Today the patient states that he's \"doing better\".   He's now getting most of his nutrition enterally (Boost VHC 3-4/day), however, he states that he's eating some soft foods.  Endorses that thin liquids such as water make him cough.  Denies choking while eating.  Endorses some significant dysgeusia and xerostomia. We discussed that he should get some taste and salivary function back over the next several months.   Continues to have odynophagia and is managing with Gabapentin.  He had been using BMX but didn't like the way the medication made him feel. Also endorses thick secretions.  A suction machine was ordered at this visit to help manage.  Heron new lumps/bumps/discolorations within his oral cavity or around his head/neck.   Denies chills, fever, SOB or chest pain.  Endorses slight fatigue and deconditioning.  Denies headaches, dizziness, instability, vision changes, hearing changes or focal sensory/motor deficits.  Denies abdominal pain, nausea, vomiting, or diarrhea.  Does c/o constipation.  We discussed, as did [facility], starting Miralax and the patient was agreeable.     Treatment Rendered:   IMRT: Bilateral Head and neck    Treatment Period Technique Fraction Dose Fractions Total Dose   Course 1 4/16/2024-5/24/2024  (days elapsed: 38)         BOT + nodes 4/16/2024-5/24/2024 VMAT 200 / 200 cGy 35 / 35 7000 / 7,000 cGy       Review of Systems:   Review of Systems   Constitutional:  Positive for fatigue. Negative for chills.   HENT:   Positive for sore throat and trouble swallowing. Negative for hearing loss, mouth sores and tinnitus.    Eyes:  Negative for eye problems.   Respiratory:  Positive for cough (with thin liquids.). Negative for shortness of breath and wheezing.    Cardiovascular:  Negative for chest pain and " leg swelling.   Gastrointestinal:  Positive for constipation. Negative for abdominal distention, abdominal pain, blood in stool, diarrhea, nausea and vomiting.   Genitourinary:  Negative for difficulty urinating and hematuria.    Musculoskeletal:  Negative for arthralgias, back pain, gait problem, neck pain and neck stiffness.   Neurological:  Positive for extremity weakness (Endorses generalized weakness from being decondtioned.). Negative for dizziness, gait problem, headaches, numbness, seizures and speech difficulty.   Psychiatric/Behavioral:  Negative for confusion and depression. The patient is not nervous/anxious.      Performance Status:   The Karnofsky performance scale today is 100, Fully active, able to carry on all pre-disease performed without restriction (ECOG equivalent 0).    Pain: Endorses oral pain today (3-4/10) continues to manage with Gabapentin.      OBJECTIVE  Vital Signs:  /70   Pulse 88   Temp 36.9 °C (98.4 °F) (Skin)   Resp 18   Wt 63.4 kg (139 lb 12.4 oz)   SpO2 99%   BMI 21.25 kg/m²     Physical Exam  Constitutional:       General: He is not in acute distress.     Appearance: Normal appearance. He is normal weight. He is not ill-appearing, toxic-appearing or diaphoretic.   HENT:      Head: Normocephalic and atraumatic. No masses.      Jaw: Trismus present. No tenderness, swelling or pain on movement.      Comments: Some pigment changes around the neck and upper chest.  Slight trismus.      Nose: Nose normal. No congestion or rhinorrhea.      Comments: Dobhoff in place right nares.      Mouth/Throat:      Mouth: Mucous membranes are moist. No oral lesions.      Dentition: Normal dentition. No dental caries or gum lesions.      Tongue: No lesions. Tongue does not deviate from midline.      Palate: No mass and lesions.      Pharynx: No pharyngeal swelling.   Eyes:      General: Lids are normal. Gaze aligned appropriately.      Extraocular Movements: Extraocular movements intact.       Pupils: Pupils are equal, round, and reactive to light.   Neck:      Thyroid: No thyroid mass or thyroid tenderness.   Cardiovascular:      Rate and Rhythm: Normal rate and regular rhythm.      Pulses: Normal pulses.      Heart sounds: Normal heart sounds. No murmur heard.  Pulmonary:      Effort: Pulmonary effort is normal. No tachypnea or respiratory distress.      Breath sounds: Normal breath sounds. No wheezing or rhonchi.   Abdominal:      General: Abdomen is flat. Bowel sounds are normal. There is no distension.      Palpations: Abdomen is soft. There is no mass.      Tenderness: There is no abdominal tenderness. There is no guarding or rebound.   Musculoskeletal:         General: No swelling, tenderness, deformity or signs of injury. Normal range of motion.      Cervical back: Full passive range of motion without pain, normal range of motion and neck supple. No rigidity or tenderness. No pain with movement. Normal range of motion.      Right lower leg: No edema.      Left lower leg: No edema.   Lymphadenopathy:      Head:      Right side of head: No submental, submandibular, tonsillar, preauricular, posterior auricular or occipital adenopathy.      Left side of head: No submental, submandibular, tonsillar, preauricular, posterior auricular or occipital adenopathy.      Cervical:      Right cervical: No superficial, deep or posterior cervical adenopathy.     Left cervical: No superficial, deep or posterior cervical adenopathy.   Skin:     General: Skin is warm and dry.      Capillary Refill: Capillary refill takes less than 2 seconds.      Coloration: Skin is not jaundiced.      Findings: No erythema, lesion or rash.   Neurological:      General: No focal deficit present.      Mental Status: He is alert and oriented to person, place, and time.      Cranial Nerves: No cranial nerve deficit.      Sensory: No sensory deficit.      Motor: No weakness.      Coordination: Coordination normal.      Gait: Gait  normal.   Psychiatric:         Attention and Perception: Attention normal.         Mood and Affect: Mood normal.         Behavior: Behavior normal. Behavior is cooperative.       ASSESSMENT:   39 y.o. male who was previously seen at the [facility] Department of Radiation Oncology for SCC of the left BOT followed by Chemoradiation (HD Cisplatin and VMAT-- 70Gy in 35 fx) ending on 5/24/24.  Patient with recent hospitalization for dysphagia and malnutrition.  Saw SLP and was cleared for Soft solids with mildy thickened liquids. Candida noted in oropharynx on exam raising concern for candidal esophagitis vs radiation esophagitis or a mixed picture. Started on PPI, Sucralfate, and oral Fluconazole.  A Dobhoff was also placed, as a temporary measure to provide nutritional support.   Today we discussed the head/neck survival plan.  The patient acknowledged understanding of frequent FU to manage the long-term SE of treatment.  Copies of the survival plan were give to the patient to forward to his dentist and PCP.      PLAN:      --CT neck and PET in 3 months.  --FU with Laurent Obrien in 3 months.   --NavDx today--mobile phlebotomy.   --Continue to follow with Dr. Burkitt and Shaila Fitzgerald PA-C for Medical Oncology management.   --Continue to follow with Dr. Cruz for ENT management.   --Continue to follow with Florian Luke RD for nutritional management.   --Referral to outpatient SLP.   --Continue Fluconazole for oral thrush.   --Continue to see your dentist at least 3 times/month.   --Continue to see your PCP at least twice a year.   --Suction machine order today for thick secretions.     Please reach out with any questions/concerns.     NCCN Guidelines were applicable to guide this patients treatment plan.  Kenan Obrien, DOMINGA-CNP

## 2024-06-06 NOTE — PROGRESS NOTES
Patient arrived ambulatory to infusion for add-on tx of IV hydration. Saw Shaila Fitzgerald PA-C in clinic prior. Denies any new or worsening sx. Tolerated 1L NS bolus without issue. Discharged in stable condition.

## 2024-06-06 NOTE — PROGRESS NOTES
NUTRITION Follow-up NOTE    Nutrition Assessment     Reason for Visit:  Pilo Hanks is a 39 y.o. male who presents for No chief complaint on file.    Diagnosis: Squamous Cell Carcinoma of left base of tongue     Prior Treatment: Chemoradiation (HD Cisplatin and VMAT-- 70Gy in 35 fx) ending on 5/24/24.     Subjective: Pt seen today in infusion (add on appt for IVF). The day after finishing CRT, he was admitted with dysphagia and unable to tolerate PO intake. He was discharged with NG-tube for enteral nutrition and modified diet consistency. No DME was established, pt will buy Boost VHC from  pharmacy. He states that TF going well, able to get in 4 per day, but was not doing much extra fluid. Hardly drinking any fluids PO d/t to dislike of thickened liquids. Provided pt with gravity bags to help with extra fluid intake.        5/31/2024     5:18 PM 5/31/2024     9:35 PM 6/1/2024    12:56 AM 6/1/2024     4:48 AM 6/1/2024     9:50 AM 6/6/2024     8:52 AM 6/6/2024     9:39 AM   Vitals   Systolic 118 110 100 119 118 110 110   Diastolic 85 79 65 79 87 70 70   Heart Rate 101 112 71 86  88 88   Temp 37.8 °C (100 °F) 37 °C (98.6 °F) 36.9 °C (98.4 °F)  36.6 °C (97.9 °F) 36.9 °C (98.4 °F) 36.9 °C (98.4 °F)   Resp 18 16 16 16 16 18 18   Weight (lb)      139.77 139.77   BMI      21.25 kg/m2 21.25 kg/m2   BSA (m2)      1.74 m2 1.74 m2   Visit Report      Report Report       Wt Readings from Last 10 Encounters:   06/06/24 63.4 kg (139 lb 12.4 oz)   06/06/24 63.4 kg (139 lb 12.4 oz)   05/28/24 61.6 kg (135 lb 12.9 oz)   05/24/24 67.9 kg (149 lb 11.2 oz)   05/21/24 63.1 kg (139 lb 1.8 oz)   05/20/24 68.8 kg (151 lb 11.2 oz)   05/17/24 68.2 kg (150 lb 5.7 oz)   05/16/24 70.4 kg (155 lb 4.8 oz)   05/15/24 68.3 kg (150 lb 9.6 oz)   05/13/24 70.2 kg (154 lb 12.2 oz)     Weight Change:  Weight loss of 6.8 kg (10%) x 1 month  Significant Weight Change: Yes    Lab Results   Component Value Date/Time    GLUCOSE 96 06/06/2024 0833      "06/06/2024 0833    K 4.2 06/06/2024 0833    CL 98 06/06/2024 0833    CO2 30 06/06/2024 0833    ANIONGAP 13 06/06/2024 0833    BUN 25 (H) 06/06/2024 0833    CREATININE 1.47 (H) 06/06/2024 0833    EGFR 61 06/06/2024 0833    CALCIUM 9.2 06/06/2024 0833    ALBUMIN 4.2 06/06/2024 0833    ALKPHOS 45 06/06/2024 0833    PROT 7.3 06/06/2024 0833    AST 16 06/06/2024 0833    BILITOT 0.3 06/06/2024 0833    ALT 15 06/06/2024 0833     Lab Results   Component Value Date/Time    VITD25 13 (A) 08/18/2021 1050        Food And Nutrient Intake:  Current Intake: <75% of estimated energy needs for >1 month  GI Symptoms: Dysphagia   Length of symptoms: 1+ week  Allergies: None  Intolerance: None  Appetite: Normal    Nutrition Focused Physical Exam Findings:  Performed/Deferred: Deferred per pt request    ESTIMATED ENERGY NEEDS  Dosing weight: 72.3 kg  Calories per day: 9601-7453  determined by 25-30 kcal/kg  Protein (g) per day:  determined by 1.0-1.5 g/kg  Estimated fluid needs: 8047-1257 determined by 1 kcal/mL     NUTRITION DIAGNOSIS  Malnutrition Diagnosis  Patient has Malnutrition Diagnosis: Yes  Diagnosis Status: Ongoing (still relevant)  Malnutrition Diagnosis: Severe malnutrition related to acute disease or injury (h/o oral CA s/p recent radiation now with n/v and dysphagia)  As Evidenced by: pt consuming <50% estimated nutritional needs x >5 days with 10% wt loss in ~1 week/17% wt loss in ~1 month PTA    NUTRITION INTERVENTION  Diet consistency per SLP: \"Soft Diet with mildly thick liquids\"    recommend 1 carton Boost VHC 4 times/day (total of 4 cartons/day), only as tolerated.  --Flush with 60mls pre and post feeds with additional FWF between feeds, per MD/team (TF + recommended FWF would provide a total of 1120mls free water/day).  --Above TF regimen would provide pt a total of 2120 kcals, 88g pro/day--meets 100% estimated kcal and pro needs.    *Provided pt with gravity bag to help get extra fluids in.    Diet Education " Materials: none    MONITOR AND EVALUATION  Monitor and Evaluation: Tolerance to tube feed, Weight trend, and Fluid Intake    Need for follow-up: Next [facility] visit    Time spent with patient: 20 minutes

## 2024-06-10 ENCOUNTER — TELEPHONE (OUTPATIENT)
Dept: RADIATION ONCOLOGY | Facility: HOSPITAL | Age: 39
End: 2024-06-10

## 2024-06-10 NOTE — TELEPHONE ENCOUNTER
Spoke to the patient this morning regrading his PO intake and he stated that he had his first meal yesterday.  He was able to eat a good portion of soft chinese food and reports no coughing or choking.  He states that he's restarted oxy to help with his odynophagia.  I informed him that a SLP referral was placed to help further assess his dysphagia.  All questions/concerns were addressed to the satisfaction of the patient.

## 2024-06-17 ENCOUNTER — NUTRITION (OUTPATIENT)
Dept: HEMATOLOGY/ONCOLOGY | Facility: HOSPITAL | Age: 39
End: 2024-06-17

## 2024-06-17 ENCOUNTER — EVALUATION (OUTPATIENT)
Dept: SPEECH THERAPY | Facility: HOSPITAL | Age: 39
End: 2024-06-17
Payer: COMMERCIAL

## 2024-06-17 DIAGNOSIS — R13.12 OROPHARYNGEAL DYSPHAGIA: Primary | ICD-10-CM

## 2024-06-17 PROCEDURE — 92526 ORAL FUNCTION THERAPY: CPT | Mod: GN

## 2024-06-17 NOTE — PROGRESS NOTES
NUTRITION COMMUNICATION NOTE    Pilo Hanks     REASON FOR COMMUNICATION: Pt seen today prior to SLP radha. He states that eating is going well. Had steak last night, had Bagel and pasta salad today. Taking in ~50% of calories through NG tube. He is hesitant about taking out NG because of his aversion to oral supplements. Discussed other high patricia/protein options. Hopefully can have feeding tube removed on 6/27 at [facility] visit.

## 2024-06-17 NOTE — PROGRESS NOTES
Speech-Language Pathology    SLP Adult Outpatient Swallow treatment     Patient Name: Pilo Hanks  MRN: [id number]  Today's Date: 6/17/2024  Time Calculation  Start Time: 1430  Stop Time: 1455  Time Calculation (min): 25 min     SUBJECTIVE  Patient alert and oriented and ready to participate in office visit this date. Currently with NGT present due to concern for aspiration, odynophagia, malnutrition and dehydration. Pt endorses increasing PO intake to regular foods and liquids without difficulty. Overall poor taste and dry mouth appear to be the most bothersome of side effects at this time.     The dysphagia history includes:      Dysphagia for solids               No    Dysphagia for liquids              No    Dysphagia for pills                  Pt has not tried pills since NGT placement.  Associated weight loss            Yes    Recent pneumonia/bronchitis  No  GERD/Acid reflux   No      OBJECTIVE  LONG TERM GOAL  Improve overall swallow safety and efficiency to tolerate the least restrictive diet.    SHORT TERM GOALS  Pt will produce a series of lingual exercises/stretches independent of cues 10 reps, 3 times per day, 7 days a week; to maintain strength and motility of the lingual muscles and reduce the effects of radiation for safe and efficient PO intake.  Pt will complete a series of jaw exercises/stretches independent of cues 3x a day, 7 days a week; to reduced the effects of radiation on the jaw muscles necessary to consume PO safely and efficiently.   Pt will complete a series of neck stretches independent of cues, 5 times a day for 7 days a week; to reduce the effects of radiation on the muscles necessary for optimal head position for safe and efficient PO intake.  Pt will complete effortful swallows 10 reps, 3 times per day for 7 days a week; to strengthen pharyngeal muscles for improve bolus clearance through the pharynx.    Patient will tolerate the least restrictive diet without overt s/s of  oropharyngeal dysphagia 10/10 trials.      TREATMENT  Instructed patient this date in: swallow reassessment via CSE completed today. Additional discussion regarding alleviations of dry mouth and changes in taste. Further encouragement regarding swallow safety and efficiency with emphasis to continue with prophylactic swallow exercises daily.     Pt consumed single sips of water, multiple cup/straw sips and 3 ounces of water via consecutive sips without overt s/s of airway invasion. Pt consumed additional PO trials of purees and regular solids with adequate oral prepping skills and clearance without overt s/s of airway invasion. SLP with no concerns for PO aspiration at this time. Pt hesitant to remove NGT today due to concern for limited PO intake and prior dehydration.     Alleviations for dry mouth and changes in taste reviewed and pt provided with handouts issued by the [employer].  Prophylactic swallow exercises encouraged and pt voice understanding of the necessity to continue this regiment daily.     Clinician modeled all techniques and accurate patient follow through confirmed.  Handouts provided to facilitate optimal home carryover.    RECOMMENDATIONS:  - Regular diet with thin liquids; alternate liquid and solids.  - Meds whole in purees or with water  - Consider removal of NGT given source of infection and aspiration.    PLAN  Monitor home program  Patient instructed to call if there are problems  Patient to follow up with SLP for the following sessions to maintain function  - 6 months  - 12 months    Progress with POC, as tolerated  Discussed POC with   Patient/caregiver agreeable with POC

## 2024-06-18 ENCOUNTER — TELEMEDICINE (OUTPATIENT)
Dept: PRIMARY CARE | Facility: CLINIC | Age: 39
End: 2024-06-18
Payer: COMMERCIAL

## 2024-06-18 ENCOUNTER — PATIENT MESSAGE (OUTPATIENT)
Dept: PRIMARY CARE | Facility: CLINIC | Age: 39
End: 2024-06-18

## 2024-06-18 DIAGNOSIS — M10.9 GOUT, UNSPECIFIED CAUSE, UNSPECIFIED CHRONICITY, UNSPECIFIED SITE: ICD-10-CM

## 2024-06-18 PROCEDURE — 99213 OFFICE O/P EST LOW 20 MIN: CPT | Performed by: FAMILY MEDICINE

## 2024-06-18 RX ORDER — COLCHICINE 0.6 MG/1
TABLET ORAL
Qty: 9 TABLET | Refills: 0 | Status: CANCELLED | OUTPATIENT
Start: 2024-06-18

## 2024-06-18 RX ORDER — COLCHICINE 0.6 MG/1
TABLET ORAL
Qty: 6 TABLET | Refills: 0 | Status: SHIPPED | OUTPATIENT
Start: 2024-06-18

## 2024-06-18 NOTE — TELEPHONE ENCOUNTER
Message from patient:    ------Good morning Dr. Villa. I am having another gout outbreak. Perfect timing with everything I am going through. [facility]. I am out of colchicine, would you be able to send a colchicine prescription over to my pharmacy? I can barely walk and my left foot is swollen. Thanks and have a great day.

## 2024-06-18 NOTE — PROGRESS NOTES
Subjective   Patient ID: Pilo Hanks is a 39 y.o. male who presents for No chief complaint on file..    HPI   39-year-old male presents the [facility] virtual care visit complaining of discomfort in his left big toe.  Began yesterday with pain, swelling and slight warmth.  No known injury or trauma.  History of gout in the same joint in the past.  Is currently undergoing cancer treatment.  Has an NG tube.  Last bout flare was a few weeks ago when he was admitted to the hospital but at that time he was dehydrated.  Currently feels he is hydrated since having the NG tube.  No fevers or chills.  Is currently on allopurinol daily.  His last uric acid on 4/4/2024 was within normal limits.    Needs a refill on his colchicine.    Review of Systems  ROS as stated in HPI  Objective   There were no vitals taken for this visit.    Physical Exam  Virtual visit on physical exam was done.  Left first MTP joint appear slightly swollen.  No obvious erythema    Assessment/Plan   Problem List Items Addressed This Visit             ICD-10-CM    Gout M10.9    Relevant Medications    colchicine 0.6 mg tablet     Rx colchicine  Follow-up if persists or worsens.    This visit was completed via VIRTUAL visit. All issues as above were discussed and addressed but no physical exam or vital signs were performed. If it was felt that the patient should be evaluated in clinic then they were directed there. The patient verbally consented to visit.

## 2024-06-19 ASSESSMENT — ENCOUNTER SYMPTOMS
HEADACHES: 0
CHILLS: 0
SHORTNESS OF BREATH: 0
FEVER: 0
NUMBNESS: 0
LEG SWELLING: 0
VOMITING: 0
ABDOMINAL PAIN: 0
COUGH: 0
NAUSEA: 0
LIGHT-HEADEDNESS: 0
ARTHRALGIAS: 0

## 2024-06-19 NOTE — PROGRESS NOTES
Patient ID: Pilo Hanks is a 39 y.o. male.  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 Phi Cruz  MedOnc:  Dr. Kyunghee Burkitt/Shaila Fitzgerald PA-C  RadOnc: Dr. Raquel Blackman    Current Therapy  4/16 - 5/28/24: concurrent concurrent chemoRT with 2 doses of HD cisplatin and 70 gy RT    Sites of Disease  Left BOT  B/L cervical LNs    Oncologic Issues    ONCOLOGIC HISTORY  - 02/2024: Presented to PCP with mass in left tonsillar area. Referred to ENT  - 3/15/24: In office biopsy of left BOT w/ Dr Cruz.  Path showed p16 positive SCC  - 3/13/29/24: CT neck and chest showed a soft tissue mass within the left oropharynx centered in the anterior tonsillar pillar/left base of tongue, measures approximately 1.8 x 1.4cm.  - 4/1/24: PET/CT showed multiple hypermetabolic bilateral multilevel cervical lymphadenopathy, largest of which located at left cervical level 2, ~1.2cm.  - 4/5/24 [facility]: Rec surgical resection vs chemoRT vs clinical trials , [id number]    Admission  5/27 - 6/1 for worsening dysphagia and odynophagia with likely candidal esophagitis. NG was placed and pt cleared for soft solids and mildly thickened fluids.     Past Medical History:   Past Medical History:  01/21/2016: Acute tonsillitis, unspecified      Comment:  Acute tonsillitis, unspecified etiology  01/11/2017: Hypertrophy of tonsils      Comment:  Lingual tonsil hypertrophy  01/05/2017: Hypertrophy of tonsils      Comment:  Tonsillar hypertrophy  No date: Other specified health status      Comment:  No pertinent past medical history  09/14/2021: Pain in right toe(s)      Comment:  Great toe pain, right  09/15/2021: Pain in unspecified ankle and joints of unspecified foot      Comment:  Ankle pain  07/27/2021: Personal history of other diseases of the nervous system   and sense organs      Comment:  History of obstructive sleep apnea  07/27/2021:  Personal history of other diseases of the respiratory   system      Comment:  History of acute pharyngitis  2016: Personal history of other diseases of the respiratory   system      Comment:  History of sore throat  2017: Personal history of other diseases of the respiratory   system      Comment:  History of airway obstruction  2021: Personal history of other specified conditions      Comment:  History of chest pain  2021: Sprain of joints and ligaments of unspecified parts of   neck, initial encounter      Comment:  Neck sprain  2017: Sprain of unspecified ligament of right ankle, initial   encounter      Comment:  Sprain of right ankle, unspecified ligament, initial                encounter   Surgical History:    Past Surgical History:   Procedure Laterality Date    MOUTH SURGERY  2017    Oral Surgery Tooth Extraction    VASECTOMY Bilateral       Family History:    Family History   Problem Relation Name Age of Onset    No Known Problems Mother      No Known Problems Father      Diabetes Paternal Grandfather      Heart failure Paternal Grandfather      Testicular cancer Other Maternal Cousin 40 - 49    Prostate cancer Other Paternal uncle      Family Oncology History:    Cancer-related family history includes Prostate cancer in an other family member; Testicular cancer (age of onset: 40 - 49) in an other family member.  Social History:    Social History     Tobacco Use    Smoking status: Former     Current packs/day: 0.00     Average packs/day: 0.3 packs/day for 15.0 years (3.8 ttl pk-yrs)     Types: Cigarettes     Start date:      Quit date:      Years since quittin.4    Smokeless tobacco: Never   Vaping Use    Vaping status: Never Used   Substance Use Topics    Alcohol use: Not Currently    Drug use: Yes     Types: Marijuana     Comment: Edibles          Subjective   Chief Complaint: Squamous Cell Carcinoma of left base of tongue    HPI  Pilo Hanks is a 39 y.o.  male with recent diagnosis of T1N2M0 P16+ Squamous Cell Carcinoma of left BOT. Who  completed chemoRT w/ 2 cycles HD Cis and 70gy RT on 5/28/24.    Interval History  Patient presents for 1 month follow up.    Patient has been feeling well.   Throat pain has largely resolved. He is no longer using the NG and is eating everything by mouth w/o odynophagia/dysphagia. He would like the NG removed today.   He does continue to endorse dry mouth, taste is still off, unable to taste sweet or salt.  Reports he had some vision changes when he started treatment but has been stable.   Endorse transient tinnitus.   Bowels are moving well, not requiring laxatives.   Denies peripheral neuropathy.     ROS  Review of Systems   Constitutional:  Negative for chills and fever.   HENT:   Positive for tinnitus. Negative for hearing loss, lump/mass, mouth sores, nosebleeds, sore throat and trouble swallowing.    Respiratory:  Negative for cough and shortness of breath.    Cardiovascular:  Negative for chest pain and leg swelling.   Gastrointestinal:  Negative for abdominal pain, constipation, nausea and vomiting.   Musculoskeletal:  Negative for arthralgias.   Skin:  Negative for rash.   Neurological:  Negative for headaches, light-headedness and numbness.       Allergies  No Known Allergies     Medications  Current Outpatient Medications   Medication Instructions    allopurinol (ZYLOPRIM) 100 mg, oral, Daily    ascorbic acid (VITAMIN C) 1,000 mg, oral, Daily    baclofen (LIORESAL) 5 mg, oral, 3 times daily    colchicine 0.6 mg tablet Use 2 tbs at the sign of gout PO can repeat in 1 hr if still with pain X 1    diphenhydramine/Maalox/lidocaine (Magic Mouthwash) - Compounded - Outpatient Swish and swallow 10 mL by mouth 3 times a day as needed for Pain.    gabapentin (NEURONTIN) 800 mg, oral, 3 times daily    glycopyrrolate (ROBINUL) 2 mg, oral, 3 times daily    ondansetron (ZOFRAN) 8 mg, oral, Every 8 hours PRN    pantoprazole (PROTONIX)  40 mg, oral, Daily, Do not crush, chew, or split.    psyllium husk, aspartame, 3.4 gram/5.8 gram powder Take 1 packet by mouth once daily. Do not fill before June 1, 2024.    sucralfate (CARAFATE) 1 g, oral, Every 6 hours scheduled    Sudogest 30 mg, oral, Every 4 hours PRN    zinc sulfate (Zincate) 220 (50 Zn) MG capsule 50 mg of elemental zinc, oral, 3 times daily        Objective   VS: /80 (BP Location: Left arm, Patient Position: Sitting, BP Cuff Size: Adult)   Pulse 84   Temp 36.7 °C (98.1 °F) (Temporal)   Resp 18   Wt 64.8 kg (142 lb 13.7 oz)   SpO2 100%   BMI 21.72 kg/m²   Weight: Daily Weight  06/27/24 : 64.8 kg (142 lb 13.7 oz)  06/06/24 : 63.4 kg (139 lb 12.4 oz)  06/06/24 : 63.4 kg (139 lb 12.4 oz)  05/28/24 : 61.6 kg (135 lb 12.9 oz)  05/24/24 : 67.9 kg (149 lb 11.2 oz)  05/21/24 : 63.1 kg (139 lb 1.8 oz)  05/20/24 : 68.8 kg (151 lb 11.2 oz)      Physical Exam  Constitutional:       Appearance: Normal appearance.   HENT:      Head: Normocephalic and atraumatic.      Mouth/Throat:      Lips: Pink.      Mouth: Mucous membranes are moist. Oral lesions present.      Tongue: No lesions.      Palate: No mass.      Comments: Mucositis in left posterior oropharynx  Eyes:      Extraocular Movements: Extraocular movements intact.      Pupils: Pupils are equal, round, and reactive to light.   Cardiovascular:      Rate and Rhythm: Normal rate and regular rhythm.      Pulses: Normal pulses.   Pulmonary:      Effort: Pulmonary effort is normal.      Breath sounds: Normal breath sounds.   Abdominal:      General: Bowel sounds are normal.   Musculoskeletal:         General: Normal range of motion.   Skin:     General: Skin is warm.   Neurological:      General: No focal deficit present.      Mental Status: He is alert and oriented to person, place, and time.   Psychiatric:         Mood and Affect: Mood normal.         Diagnostic Results   Labs  Below labs are reviewed today.  Results from last 7 days   Lab  Units 06/27/24  0901   WBC AUTO x10*3/uL 3.7*   HEMOGLOBIN g/dL 10.3*   HEMATOCRIT % 31.0*   PLATELETS AUTO x10*3/uL 279   NEUTROS ABS x10*3/uL 2.71   LYMPHS ABS AUTO x10*3/uL 0.40*   MONOS ABS AUTO x10*3/uL 0.47   EOS ABS AUTO x10*3/uL 0.12   NEUTROS PCT AUTO % 72.5   LYMPHS PCT AUTO % 10.7   MONOS PCT AUTO % 12.6   EOS PCT AUTO % 3.2        Results from last 7 days   Lab Units 06/27/24  0901   GLUCOSE mg/dL 91   SODIUM mmol/L 139   POTASSIUM mmol/L 4.3   CHLORIDE mmol/L 99   CO2 mmol/L 29   BUN mg/dL 16   CREATININE mg/dL 1.34*   EGFR mL/min/1.73m*2 69   CALCIUM mg/dL 9.9   MAGNESIUM mg/dL 1.71   ALBUMIN g/dL 4.4   PROTEIN TOTAL g/dL 7.8   BILIRUBIN TOTAL mg/dL 0.5   ALK PHOS U/L 38   ALT U/L 10   AST U/L 13                   Images  3/29/24 CT neck w/ contrast:   IMPRESSION:  Ill-defined soft tissue prominence within the left oropharynx centered in the anterior tonsillar pillar/left base of tongue, may measure approximately 1.8 cm, and likely corresponds to lesion visualized on clinical exam. No involvement of the parapharyngeal  spaces or osseous involvement.  Soft tissue filling the vallecula is thought to represent lingual tonsil.  Bilateral prominent cervical lymph nodes, largest in left level 2A measuring 1.2 cm. No enlarged lymph nodes by size criteria.     3/29/24 CT chest w/o contrast:   IMPRESSION:  1. There is a 3 mm left lower lobe nodule and a 2-3 mm left upper lobe nodule.  2. No evidence for lymphadenopathy. Probable hepatic cysts as described.  3. Incidental note is made of residual thymic tissue within the anterior mediastinum.     4/1/24 PET CT:  IMPRESSION:  1. Ill-defined large focal hypermetabolic soft tissue lesion centered in the anterior tonsillar pillar/left base of tongue bulging into the oropharynx, consistent with biopsy-proven squamous cell carcinoma.  2. Hypermetabolic focus at right tongue base, which could be physiologic, although neoplastic disease can not be  "excluded.  3. Multiple hypermetabolic bilateral multilevel cervical lymphadenopathy, largest of which located at left cervical level 2, as detailed above, compatible with locoregional metastatic omer  disease.  4. No evidence of hypermetabolic distant metastatic disease.    Pathology  No results found for: \"KBXHX\", \"PATHREP\", \"INTERP\", \"ADD1\", \"ADD2\", \"ADD3\", \"CORRECTIONHX\", \"ASTUDIES\", \"ANCILLARY1\", \"ANCILLARY2\", \"FINALINTERP\", \"COMDX\"      Assessment/Plan   Mr. Hanks is 40 y/o male with recent diagnosis of T2N2M0, P16+  left base of tongue squamous cell carcinoma who completed concurrent concurrent chemoRT with 2 doses of HD cisplatin and 70 gy RT on 5/28/24.    # Stage II (T1N2M0) p16 positive left base of tongue squamous cell carcinoma   - PET/CT showed uptake in left base of tongue, measures approximately 1.8 x 1.4cm as well as multiple hypermetabolic bilateral multilevel cervical lymphadenopathy, largest of which located at left cervical level 2, ~1.2cm.  - GFR >90 (3/25/24 labs), no hearing deficits  - Discussed with patient about SOC (concurrent chemorad with high dose cisplatin) vs clinical trial (: High dose cisplatin vs weekly cis or [id number]: CRT followed by Nivolumab), patient might be interested in clinical trial, he will think about it over the weekend and let us know.  - 4/16/24: Patient started on chemoRT w/ HD Cisplatin. Feeling well, left tongue mass not subjectively increased in size. Mild dysphagia. Patient is deciding on 6 vs 7 weeks of RT and will get back to us. Patient is young (40yo) and has 3 children ages 3, 9 and 16 at home. Will refer him to Chaya Augustine NP for young adult services.    - 4/25/24: post chemo he experienced hiccups, likely from dex, has few episodes of diarrhea but resolved.  Otherwise, tolerated chemo well.  Cre 1.48 GFR 61 (baseline 73-83), improved from 4/19 labs, but will give 1L hydration today.  - 5/7/24: Patient tolerated C1 well but had some decrease in renal " function, and hiccups that were not resolved with Baclofen. He had very good response so far as his left BOT mass is no longer visit. He does have mucositis in this area and has increasing odynophagia. Continue current pain regimen, continue Boost VHC x2/day.  - 6/6/24: Admitted 5/27 - 6/1 for worsening dysphagia and odynophagia with likely candidal esophagitis. NG was placed and pt cleared for soft solids and mildly thickened fluids. He still has mucositis evident is oropharynx w/odynophagia and dysphagia, taking Gabapentin  800mg TID.   - 6/27/24: Patient doing well 1 month from treatment. Odynophagia is largely resolved and he's tolerating a regular diet and ready for NG to be removed. Had 3 lb weight gain in 3 weeks. Still has lingering SE such as xerostomia, dysgeusia but are improving.     # DELON  - During admission, Cr around 1.4.   - 6/6/: Cr 1.47, GFR 61. 1L NS given for hydration  - 6/27: Cr 1.34, GFR 69. Kidney function improving. Patient reports he's drinking plenty of water. Will continue to encourage PO intake. If throat pain occurs, suggest Tylenol over NSAIDs    # Constipation  - Not currently taking stool softeners or laxatives. Last BM 3 days ago, does not feel bloated currently  - Patient to start Miralax PRN     PLAN  -- Remove NG tube today  -- RTC 1 mo / MedOnc, labs cbc, cmp, mag on 8/1  -- 8/29/24 [facility] PET/CT and FUV w/ RadOnc  -- 11/19 FUV w/ NP Daunov   -- Continue ample hydration

## 2024-06-27 ENCOUNTER — LAB (OUTPATIENT)
Dept: LAB | Facility: HOSPITAL | Age: 39
End: 2024-06-27
Payer: COMMERCIAL

## 2024-06-27 ENCOUNTER — OFFICE VISIT (OUTPATIENT)
Dept: HEMATOLOGY/ONCOLOGY | Facility: HOSPITAL | Age: 39
End: 2024-06-27
Payer: COMMERCIAL

## 2024-06-27 VITALS
RESPIRATION RATE: 18 BRPM | BODY MASS INDEX: 21.72 KG/M2 | OXYGEN SATURATION: 100 % | DIASTOLIC BLOOD PRESSURE: 80 MMHG | WEIGHT: 142.86 LBS | TEMPERATURE: 98.1 F | SYSTOLIC BLOOD PRESSURE: 134 MMHG | HEART RATE: 84 BPM

## 2024-06-27 DIAGNOSIS — R43.2 DYSGEUSIA: ICD-10-CM

## 2024-06-27 DIAGNOSIS — Z08 ENCOUNTER FOR FOLLOW-UP SURVEILLANCE OF HEAD AND NECK CANCER: ICD-10-CM

## 2024-06-27 DIAGNOSIS — K11.7 XEROSTOMIA: ICD-10-CM

## 2024-06-27 DIAGNOSIS — N17.9 AKI (ACUTE KIDNEY INJURY) (CMS-HCC): ICD-10-CM

## 2024-06-27 DIAGNOSIS — C10.9 MALIGNANT NEOPLASM OF OROPHARYNX (MULTI): ICD-10-CM

## 2024-06-27 DIAGNOSIS — C10.9 MALIGNANT NEOPLASM OF OROPHARYNX (MULTI): Primary | ICD-10-CM

## 2024-06-27 DIAGNOSIS — Z85.89 ENCOUNTER FOR FOLLOW-UP SURVEILLANCE OF HEAD AND NECK CANCER: ICD-10-CM

## 2024-06-27 LAB
ALBUMIN SERPL BCP-MCNC: 4.4 G/DL (ref 3.4–5)
ALP SERPL-CCNC: 38 U/L (ref 33–120)
ALT SERPL W P-5'-P-CCNC: 10 U/L (ref 10–52)
ANION GAP SERPL CALC-SCNC: 15 MMOL/L (ref 10–20)
AST SERPL W P-5'-P-CCNC: 13 U/L (ref 9–39)
BASOPHILS # BLD AUTO: 0.02 X10*3/UL (ref 0–0.1)
BASOPHILS NFR BLD AUTO: 0.5 %
BILIRUB SERPL-MCNC: 0.5 MG/DL (ref 0–1.2)
BUN SERPL-MCNC: 16 MG/DL (ref 6–23)
CALCIUM SERPL-MCNC: 9.9 MG/DL (ref 8.6–10.3)
CHLORIDE SERPL-SCNC: 99 MMOL/L (ref 98–107)
CO2 SERPL-SCNC: 29 MMOL/L (ref 21–32)
CREAT SERPL-MCNC: 1.34 MG/DL (ref 0.5–1.3)
EGFRCR SERPLBLD CKD-EPI 2021: 69 ML/MIN/1.73M*2
EOSINOPHIL # BLD AUTO: 0.12 X10*3/UL (ref 0–0.7)
EOSINOPHIL NFR BLD AUTO: 3.2 %
ERYTHROCYTE [DISTWIDTH] IN BLOOD BY AUTOMATED COUNT: 15.5 % (ref 11.5–14.5)
GLUCOSE SERPL-MCNC: 91 MG/DL (ref 74–99)
HCT VFR BLD AUTO: 31 % (ref 41–52)
HGB BLD-MCNC: 10.3 G/DL (ref 13.5–17.5)
IMM GRANULOCYTES # BLD AUTO: 0.02 X10*3/UL (ref 0–0.7)
IMM GRANULOCYTES NFR BLD AUTO: 0.5 % (ref 0–0.9)
LYMPHOCYTES # BLD AUTO: 0.4 X10*3/UL (ref 1.2–4.8)
LYMPHOCYTES NFR BLD AUTO: 10.7 %
MAGNESIUM SERPL-MCNC: 1.71 MG/DL (ref 1.6–2.4)
MCH RBC QN AUTO: 29.6 PG (ref 26–34)
MCHC RBC AUTO-ENTMCNC: 33.2 G/DL (ref 32–36)
MCV RBC AUTO: 89 FL (ref 80–100)
MONOCYTES # BLD AUTO: 0.47 X10*3/UL (ref 0.1–1)
MONOCYTES NFR BLD AUTO: 12.6 %
NEUTROPHILS # BLD AUTO: 2.71 X10*3/UL (ref 1.2–7.7)
NEUTROPHILS NFR BLD AUTO: 72.5 %
NRBC BLD-RTO: 0 /100 WBCS (ref 0–0)
PLATELET # BLD AUTO: 279 X10*3/UL (ref 150–450)
POTASSIUM SERPL-SCNC: 4.3 MMOL/L (ref 3.5–5.3)
PROT SERPL-MCNC: 7.8 G/DL (ref 6.4–8.2)
RBC # BLD AUTO: 3.48 X10*6/UL (ref 4.5–5.9)
SODIUM SERPL-SCNC: 139 MMOL/L (ref 136–145)
WBC # BLD AUTO: 3.7 X10*3/UL (ref 4.4–11.3)

## 2024-06-27 PROCEDURE — 99215 OFFICE O/P EST HI 40 MIN: CPT | Performed by: STUDENT IN AN ORGANIZED HEALTH CARE EDUCATION/TRAINING PROGRAM

## 2024-06-27 PROCEDURE — 36415 COLL VENOUS BLD VENIPUNCTURE: CPT

## 2024-06-27 PROCEDURE — 1036F TOBACCO NON-USER: CPT | Performed by: STUDENT IN AN ORGANIZED HEALTH CARE EDUCATION/TRAINING PROGRAM

## 2024-06-27 PROCEDURE — 85025 COMPLETE CBC W/AUTO DIFF WBC: CPT

## 2024-06-27 PROCEDURE — 83735 ASSAY OF MAGNESIUM: CPT

## 2024-06-27 PROCEDURE — 80053 COMPREHEN METABOLIC PANEL: CPT

## 2024-06-27 RX ORDER — ZINC SULFATE 50(220)MG
50 CAPSULE ORAL 3 TIMES DAILY
Qty: 90 CAPSULE | Refills: 3 | Status: SHIPPED | OUTPATIENT
Start: 2024-06-27 | End: 2025-06-27

## 2024-06-27 ASSESSMENT — PAIN SCALES - GENERAL: PAINLEVEL: 0-NO PAIN

## 2024-06-27 ASSESSMENT — ENCOUNTER SYMPTOMS
SORE THROAT: 0
CONSTIPATION: 0

## 2024-06-27 NOTE — PROGRESS NOTES
NG tube removed today in clinic per order from BRETT Fitzgerald. Patient tolerated removal. No additional questions or concerns.

## 2024-06-28 ASSESSMENT — ENCOUNTER SYMPTOMS: TROUBLE SWALLOWING: 0

## 2024-07-01 ENCOUNTER — TELEPHONE (OUTPATIENT)
Dept: RADIATION ONCOLOGY | Facility: HOSPITAL | Age: 39
End: 2024-07-01
Payer: COMMERCIAL

## 2024-07-01 ASSESSMENT — ENCOUNTER SYMPTOMS
VOMITING: 0
TROUBLE SWALLOWING: 0
SORE THROAT: 0
SHORTNESS OF BREATH: 0
NUMBNESS: 0
LIGHT-HEADEDNESS: 0
COUGH: 0
ABDOMINAL PAIN: 0
HEADACHES: 0
ARTHRALGIAS: 0
CHILLS: 0
NAUSEA: 0
CONSTIPATION: 0
FEVER: 0
LEG SWELLING: 0

## 2024-07-01 NOTE — PROGRESS NOTES
Patient ID: Pilo Hanks is a 39 y.o. male.  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 Phi Cruz  MedOnc:  Dr. Kyunghee Burkitt/Shaila Fitzgerald PA-C  RadOnc: Dr. Raquel Blackman    Current Therapy  4/16 - 5/28/24: concurrent concurrent chemoRT with 2 doses of HD cisplatin and 70 gy RT    Sites of Disease  Left BOT  B/L cervical LNs    Oncologic Issues    ONCOLOGIC HISTORY  - 02/2024: Presented to PCP with mass in left tonsillar area. Referred to ENT  - 3/15/24: In office biopsy of left BOT w/ Dr Cruz.  Path showed p16 positive SCC  - 3/13/29/24: CT neck and chest showed a soft tissue mass within the left oropharynx centered in the anterior tonsillar pillar/left base of tongue, measures approximately 1.8 x 1.4cm.  - 4/1/24: PET/CT showed multiple hypermetabolic bilateral multilevel cervical lymphadenopathy, largest of which located at left cervical level 2, ~1.2cm.  - 4/5/24 [facility]: Rec surgical resection vs chemoRT vs clinical trials , [id number]    Admission  5/27 - 6/1 for worsening dysphagia and odynophagia with likely candidal esophagitis. NG was placed and pt cleared for soft solids and mildly thickened fluids.     Past Medical History:   Past Medical History:  01/21/2016: Acute tonsillitis, unspecified      Comment:  Acute tonsillitis, unspecified etiology  01/11/2017: Hypertrophy of tonsils      Comment:  Lingual tonsil hypertrophy  01/05/2017: Hypertrophy of tonsils      Comment:  Tonsillar hypertrophy  No date: Other specified health status      Comment:  No pertinent past medical history  09/14/2021: Pain in right toe(s)      Comment:  Great toe pain, right  09/15/2021: Pain in unspecified ankle and joints of unspecified foot      Comment:  Ankle pain  07/27/2021: Personal history of other diseases of the nervous system   and sense organs      Comment:  History of obstructive sleep apnea  07/27/2021:  Personal history of other diseases of the respiratory   system      Comment:  History of acute pharyngitis  2016: Personal history of other diseases of the respiratory   system      Comment:  History of sore throat  2017: Personal history of other diseases of the respiratory   system      Comment:  History of airway obstruction  2021: Personal history of other specified conditions      Comment:  History of chest pain  2021: Sprain of joints and ligaments of unspecified parts of   neck, initial encounter      Comment:  Neck sprain  2017: Sprain of unspecified ligament of right ankle, initial   encounter      Comment:  Sprain of right ankle, unspecified ligament, initial                encounter   Surgical History:    Past Surgical History:   Procedure Laterality Date    MOUTH SURGERY  2017    Oral Surgery Tooth Extraction    VASECTOMY Bilateral       Family History:    Family History   Problem Relation Name Age of Onset    No Known Problems Mother      No Known Problems Father      Diabetes Paternal Grandfather      Heart failure Paternal Grandfather      Testicular cancer Other Maternal Cousin 40 - 49    Prostate cancer Other Paternal uncle      Family Oncology History:    Cancer-related family history includes Prostate cancer in an other family member; Testicular cancer (age of onset: 40 - 49) in an other family member.  Social History:    Social History     Tobacco Use    Smoking status: Former     Current packs/day: 0.00     Average packs/day: 0.3 packs/day for 15.0 years (3.8 ttl pk-yrs)     Types: Cigarettes     Start date:      Quit date:      Years since quittin.5    Smokeless tobacco: Never   Vaping Use    Vaping status: Never Used   Substance Use Topics    Alcohol use: Not Currently    Drug use: Yes     Types: Marijuana     Comment: Edibles          Subjective   Chief Complaint: Squamous Cell Carcinoma of left base of tongue    HPI  Pilo Hanks is a 39 y.o.  male with recent diagnosis of T1N2M0 P16+ Squamous Cell Carcinoma of left BOT. Who  completed chemoRT w/ 2 cycles HD Cis and 70gy RT on 5/28/24.    Interval History  Patient presents for 2 month follow up.    Patient reports he's feeling well.   Dysphagia/odynophagia has resolved he's tolerating a regular diet.   Taste is improving though still unable to taste sweetness and is sensitive to spices.   Occasional neck stiffness.   Has dry mouth, reports drinking a lot of fluids, about 60oz a day.   Mucous production has largely resolved.   Denies tinnitus, denies peripheral neuropathy.   Bowels are moving well, not requiring laxatives.     ROS  Review of Systems   Constitutional:  Negative for chills and fever.   HENT:   Negative for hearing loss, lump/mass, mouth sores, nosebleeds, sore throat, tinnitus and trouble swallowing.    Respiratory:  Negative for cough and shortness of breath.    Cardiovascular:  Negative for chest pain and leg swelling.   Gastrointestinal:  Negative for abdominal pain, constipation, nausea and vomiting.   Musculoskeletal:  Negative for arthralgias.   Skin:  Negative for rash.   Neurological:  Negative for headaches, light-headedness and numbness.       Allergies  No Known Allergies     Medications  Current Outpatient Medications   Medication Instructions    allopurinol (ZYLOPRIM) 100 mg, oral, Daily    ascorbic acid (VITAMIN C) 1,000 mg, oral, Daily    baclofen (LIORESAL) 5 mg, oral, 3 times daily    colchicine 0.6 mg tablet Use 2 tbs at the sign of gout PO can repeat in 1 hr if still with pain X 1    glycopyrrolate (ROBINUL) 2 mg, oral, 3 times daily    pantoprazole (PROTONIX) 40 mg, oral, Daily, Do not crush, chew, or split.    psyllium husk, aspartame, 3.4 gram/5.8 gram powder Take 1 packet by mouth once daily. Do not fill before June 1, 2024.    Sudogest 30 mg, oral, Every 4 hours PRN    zinc sulfate (Zincate) 220 (50 Zn) MG capsule 50 mg of elemental zinc, oral, 3 times daily         Objective   VS: /85   Pulse 89   Temp 36.3 °C (97.3 °F) (Core)   Resp 18   Wt 63.4 kg (139 lb 12.4 oz)   SpO2 100%   BMI 21.25 kg/m²   Weight: Daily Weight  08/01/24 : 63.4 kg (139 lb 12.4 oz)  06/27/24 : 64.8 kg (142 lb 13.7 oz)  06/06/24 : 63.4 kg (139 lb 12.4 oz)  06/06/24 : 63.4 kg (139 lb 12.4 oz)  05/28/24 : 61.6 kg (135 lb 12.9 oz)  05/24/24 : 67.9 kg (149 lb 11.2 oz)  05/21/24 : 63.1 kg (139 lb 1.8 oz)      Physical Exam  Vitals reviewed.   Constitutional:       Appearance: Normal appearance. He is well-developed.   HENT:      Head: Normocephalic and atraumatic.      Right Ear: Hearing and external ear normal.      Left Ear: Hearing and external ear normal.      Nose: Nose normal.      Mouth/Throat:      Lips: Pink.      Mouth: Mucous membranes are moist. Oral lesions present.      Tongue: No lesions.      Palate: No mass.      Comments: Mucositis in left posterior oropharynx  Eyes:      General: Lids are normal.      Extraocular Movements: Extraocular movements intact.      Pupils: Pupils are equal, round, and reactive to light.   Neck:      Thyroid: No thyroid mass.   Cardiovascular:      Rate and Rhythm: Normal rate and regular rhythm.      Pulses: Normal pulses.   Pulmonary:      Effort: Pulmonary effort is normal.      Breath sounds: Normal breath sounds.   Abdominal:      General: Bowel sounds are normal.   Musculoskeletal:         General: Normal range of motion.      Cervical back: Full passive range of motion without pain and normal range of motion. No pain with movement or muscular tenderness.   Lymphadenopathy:      Cervical: No cervical adenopathy.      Right cervical: No superficial cervical adenopathy.     Left cervical: No superficial cervical adenopathy.   Skin:     General: Skin is warm.   Neurological:      General: No focal deficit present.      Mental Status: He is alert and oriented to person, place, and time.   Psychiatric:         Mood and Affect: Mood normal.          Behavior: Behavior is cooperative.       Diagnostic Results   Labs  Below labs are reviewed today.  Results from last 7 days   Lab Units 08/01/24  0842   WBC AUTO x10*3/uL 4.2*   HEMOGLOBIN g/dL 10.6*   HEMATOCRIT % 32.0*   PLATELETS AUTO x10*3/uL 266   NEUTROS ABS x10*3/uL 3.41   LYMPHS ABS AUTO x10*3/uL 0.30*   MONOS ABS AUTO x10*3/uL 0.30   EOS ABS AUTO x10*3/uL 0.14   NEUTROS PCT AUTO % 81.6   LYMPHS PCT AUTO % 7.2   MONOS PCT AUTO % 7.2   EOS PCT AUTO % 3.3        Results from last 7 days   Lab Units 08/01/24  0842   GLUCOSE mg/dL 133*   SODIUM mmol/L 141   POTASSIUM mmol/L 3.8   CHLORIDE mmol/L 103   CO2 mmol/L 27   BUN mg/dL 20   CREATININE mg/dL 1.35*   EGFR mL/min/1.73m*2 68   CALCIUM mg/dL 9.3   ALBUMIN g/dL 4.5   PROTEIN TOTAL g/dL 7.3   BILIRUBIN TOTAL mg/dL 0.3   ALK PHOS U/L 36   ALT U/L 16   AST U/L 14       Results from last 7 days   Lab Units 08/01/24  0842   TSH mIU/L 1.51               Images  3/29/24 CT neck w/ contrast:   IMPRESSION:  Ill-defined soft tissue prominence within the left oropharynx centered in the anterior tonsillar pillar/left base of tongue, may measure approximately 1.8 cm, and likely corresponds to lesion visualized on clinical exam. No involvement of the parapharyngeal  spaces or osseous involvement.  Soft tissue filling the vallecula is thought to represent lingual tonsil.  Bilateral prominent cervical lymph nodes, largest in left level 2A measuring 1.2 cm. No enlarged lymph nodes by size criteria.     3/29/24 CT chest w/o contrast:   IMPRESSION:  1. There is a 3 mm left lower lobe nodule and a 2-3 mm left upper lobe nodule.  2. No evidence for lymphadenopathy. Probable hepatic cysts as described.  3. Incidental note is made of residual thymic tissue within the anterior mediastinum.     4/1/24 PET CT:  IMPRESSION:  1. Ill-defined large focal hypermetabolic soft tissue lesion centered in the anterior tonsillar pillar/left base of tongue bulging into the oropharynx,  "consistent with biopsy-proven squamous cell carcinoma.  2. Hypermetabolic focus at right tongue base, which could be physiologic, although neoplastic disease can not be excluded.  3. Multiple hypermetabolic bilateral multilevel cervical lymphadenopathy, largest of which located at left cervical level 2, as detailed above, compatible with locoregional metastatic omer  disease.  4. No evidence of hypermetabolic distant metastatic disease.    Pathology  No results found for: \"KBXHX\", \"PATHREP\", \"INTERP\", \"ADD1\", \"ADD2\", \"ADD3\", \"CORRECTIONHX\", \"ASTUDIES\", \"ANCILLARY1\", \"ANCILLARY2\", \"FINALINTERP\", \"COMDX\"      Assessment/Plan   Mr. Hanks is 40 y/o male with recent diagnosis of T2N2M0, P16+  left base of tongue squamous cell carcinoma who completed concurrent concurrent chemoRT with 2 doses of HD cisplatin and 70 gy RT on 5/28/24.    # Stage II (T1N2M0) p16 positive left base of tongue squamous cell carcinoma   - PET/CT showed uptake in left base of tongue, measures approximately 1.8 x 1.4cm as well as multiple hypermetabolic bilateral multilevel cervical lymphadenopathy, largest of which located at left cervical level 2, ~1.2cm.  - GFR >90 (3/25/24 labs), no hearing deficits  - Discussed with patient about SOC (concurrent chemorad with high dose cisplatin) vs clinical trial (: High dose cisplatin vs weekly cis or [id number]: CRT followed by Nivolumab), patient might be interested in clinical trial, he will think about it over the weekend and let us know.  - 4/16/24: Patient started on chemoRT w/ HD Cisplatin. Feeling well, left tongue mass not subjectively increased in size. Mild dysphagia. Patient is deciding on 6 vs 7 weeks of RT and will get back to us. Patient is young (40yo) and has 3 children ages 3, 9 and 16 at home. Will refer him to Chaya Augustine NP for young adult services.    - 4/25/24: post chemo he experienced hiccups, likely from dex, has few episodes of diarrhea but resolved.  Otherwise, tolerated chemo " well.  Cre 1.48 GFR 61 (baseline 73-83), improved from 4/19 labs, but will give 1L hydration today.  - 5/7/24: Patient tolerated C1 well but had some decrease in renal function, and hiccups that were not resolved with Baclofen. He had very good response so far as his left BOT mass is no longer visit. He does have mucositis in this area and has increasing odynophagia. Continue current pain regimen, continue Boost VHC x2/day.  - 6/6/24: Admitted 5/27 - 6/1 for worsening dysphagia and odynophagia with likely candidal esophagitis. NG was placed and pt cleared for soft solids and mildly thickened fluids. He still has mucositis evident is oropharynx w/odynophagia and dysphagia, taking Gabapentin  800mg TID.   - 6/27/24: Patient doing well 1 month from treatment. Odynophagia is largely resolved and he's tolerating a regular diet and ready for NG to be removed. Had 3 lb weight gain in 3 weeks. Still has lingering SE such as xerostomia, dysgeusia but are improving.   - 8/1/24: Patient doing well 2 months from treatment. Odynophagia/dysphagia resolved, tolerating regular diet. Energy is improved. Mild stiffness in the jaw and neck muscles.     # Dysgeusia  - 8/1/24: improving, still unable to taste sweetness and is sensitive to spices. Will continue Zinc Sulfate 50mg TID    # DELON  - During admission, Cr around 1.4.   - 6/6/: Cr 1.47, GFR 61. 1L NS given for hydration  - 6/27: Cr 1.34, GFR 69. Kidney function improving. Patient reports he's drinking plenty of water. Will continue to encourage PO intake. If throat pain occurs, suggest Tylenol over NSAIDs  - 8/1: Cr 1.35, GFR 68. Renal function about the same as 2 months ago. Patient reports drinking about 60oz fluids PO. This may be patients new baseline kidney function.     # Constipation: resolved  - Not currently taking stool softeners or laxatives. Last BM 3 days ago, does not feel bloated currently  - Patient to start Miralax PRN     PLAN  -- 8/9/24 FUV w/ Dr. Cruz  --  8/29/24 [facility] PET/CT and FUV w/ RadOnc ,   -- RTC 1 month w/ MedOnc, on 8/29/24, labs cbc, cmp  -- 11/19 FUV w/ NP Daunov   -- Continue zinc sulfate 50mg TID for dysgeusia  -- Continue ample PO hydration

## 2024-07-01 NOTE — TELEPHONE ENCOUNTER
Spoke to the patient today about 6/13/24 [facility] results.  Per a [facility] report, the patient is negative for HPV16.   All questions/concerns were addressed to the satisfaction of the patient.

## 2024-07-19 ENCOUNTER — PATIENT MESSAGE (OUTPATIENT)
Dept: RADIATION ONCOLOGY | Facility: HOSPITAL | Age: 39
End: 2024-07-19
Payer: COMMERCIAL

## 2024-07-19 DIAGNOSIS — C10.9 MALIGNANT NEOPLASM OF OROPHARYNX (MULTI): Primary | ICD-10-CM

## 2024-07-19 RX ORDER — OXYCODONE HYDROCHLORIDE 5 MG/1
5 TABLET ORAL EVERY 6 HOURS PRN
Qty: 28 TABLET | Refills: 0 | Status: SHIPPED | OUTPATIENT
Start: 2024-07-19 | End: 2024-07-26

## 2024-08-01 ENCOUNTER — LAB (OUTPATIENT)
Dept: LAB | Facility: HOSPITAL | Age: 39
End: 2024-08-01
Payer: COMMERCIAL

## 2024-08-01 ENCOUNTER — OFFICE VISIT (OUTPATIENT)
Dept: HEMATOLOGY/ONCOLOGY | Facility: HOSPITAL | Age: 39
End: 2024-08-01
Payer: COMMERCIAL

## 2024-08-01 VITALS
WEIGHT: 139.77 LBS | RESPIRATION RATE: 18 BRPM | DIASTOLIC BLOOD PRESSURE: 85 MMHG | BODY MASS INDEX: 21.25 KG/M2 | SYSTOLIC BLOOD PRESSURE: 126 MMHG | OXYGEN SATURATION: 100 % | HEART RATE: 89 BPM | TEMPERATURE: 97.3 F

## 2024-08-01 DIAGNOSIS — C10.9 MALIGNANT NEOPLASM OF OROPHARYNX (MULTI): Primary | ICD-10-CM

## 2024-08-01 DIAGNOSIS — R79.89 ELEVATED SERUM CREATININE: ICD-10-CM

## 2024-08-01 DIAGNOSIS — M43.6 NECK STIFFNESS: ICD-10-CM

## 2024-08-01 DIAGNOSIS — Z08 ENCOUNTER FOR FOLLOW-UP SURVEILLANCE OF HEAD AND NECK CANCER: ICD-10-CM

## 2024-08-01 DIAGNOSIS — Z92.3 HISTORY OF HEAD AND NECK RADIATION: ICD-10-CM

## 2024-08-01 DIAGNOSIS — C10.9 MALIGNANT NEOPLASM OF OROPHARYNX (MULTI): ICD-10-CM

## 2024-08-01 DIAGNOSIS — Z85.89 ENCOUNTER FOR FOLLOW-UP SURVEILLANCE OF HEAD AND NECK CANCER: ICD-10-CM

## 2024-08-01 DIAGNOSIS — R43.2 DYSGEUSIA: ICD-10-CM

## 2024-08-01 LAB
ALBUMIN SERPL BCP-MCNC: 4.5 G/DL (ref 3.4–5)
ALP SERPL-CCNC: 36 U/L (ref 33–120)
ALT SERPL W P-5'-P-CCNC: 16 U/L (ref 10–52)
ANION GAP SERPL CALC-SCNC: 15 MMOL/L (ref 10–20)
AST SERPL W P-5'-P-CCNC: 14 U/L (ref 9–39)
BASOPHILS # BLD AUTO: 0.01 X10*3/UL (ref 0–0.1)
BASOPHILS NFR BLD AUTO: 0.2 %
BILIRUB SERPL-MCNC: 0.3 MG/DL (ref 0–1.2)
BUN SERPL-MCNC: 20 MG/DL (ref 6–23)
CALCIUM SERPL-MCNC: 9.3 MG/DL (ref 8.6–10.3)
CHLORIDE SERPL-SCNC: 103 MMOL/L (ref 98–107)
CO2 SERPL-SCNC: 27 MMOL/L (ref 21–32)
CREAT SERPL-MCNC: 1.35 MG/DL (ref 0.5–1.3)
EGFRCR SERPLBLD CKD-EPI 2021: 68 ML/MIN/1.73M*2
EOSINOPHIL # BLD AUTO: 0.14 X10*3/UL (ref 0–0.7)
EOSINOPHIL NFR BLD AUTO: 3.3 %
ERYTHROCYTE [DISTWIDTH] IN BLOOD BY AUTOMATED COUNT: 14.7 % (ref 11.5–14.5)
GLUCOSE SERPL-MCNC: 133 MG/DL (ref 74–99)
HCT VFR BLD AUTO: 32 % (ref 41–52)
HGB BLD-MCNC: 10.6 G/DL (ref 13.5–17.5)
IMM GRANULOCYTES # BLD AUTO: 0.02 X10*3/UL (ref 0–0.7)
IMM GRANULOCYTES NFR BLD AUTO: 0.5 % (ref 0–0.9)
LYMPHOCYTES # BLD AUTO: 0.3 X10*3/UL (ref 1.2–4.8)
LYMPHOCYTES NFR BLD AUTO: 7.2 %
MCH RBC QN AUTO: 30.5 PG (ref 26–34)
MCHC RBC AUTO-ENTMCNC: 33.1 G/DL (ref 32–36)
MCV RBC AUTO: 92 FL (ref 80–100)
MONOCYTES # BLD AUTO: 0.3 X10*3/UL (ref 0.1–1)
MONOCYTES NFR BLD AUTO: 7.2 %
NEUTROPHILS # BLD AUTO: 3.41 X10*3/UL (ref 1.2–7.7)
NEUTROPHILS NFR BLD AUTO: 81.6 %
NRBC BLD-RTO: 0 /100 WBCS (ref 0–0)
PLATELET # BLD AUTO: 266 X10*3/UL (ref 150–450)
POTASSIUM SERPL-SCNC: 3.8 MMOL/L (ref 3.5–5.3)
PROT SERPL-MCNC: 7.3 G/DL (ref 6.4–8.2)
RBC # BLD AUTO: 3.47 X10*6/UL (ref 4.5–5.9)
SODIUM SERPL-SCNC: 141 MMOL/L (ref 136–145)
TSH SERPL-ACNC: 1.51 MIU/L (ref 0.44–3.98)
WBC # BLD AUTO: 4.2 X10*3/UL (ref 4.4–11.3)

## 2024-08-01 PROCEDURE — 85025 COMPLETE CBC W/AUTO DIFF WBC: CPT

## 2024-08-01 PROCEDURE — 99215 OFFICE O/P EST HI 40 MIN: CPT | Performed by: STUDENT IN AN ORGANIZED HEALTH CARE EDUCATION/TRAINING PROGRAM

## 2024-08-01 PROCEDURE — 80053 COMPREHEN METABOLIC PANEL: CPT

## 2024-08-01 PROCEDURE — 36415 COLL VENOUS BLD VENIPUNCTURE: CPT

## 2024-08-01 PROCEDURE — 1036F TOBACCO NON-USER: CPT | Performed by: STUDENT IN AN ORGANIZED HEALTH CARE EDUCATION/TRAINING PROGRAM

## 2024-08-01 PROCEDURE — 84443 ASSAY THYROID STIM HORMONE: CPT

## 2024-08-01 ASSESSMENT — PAIN SCALES - GENERAL: PAINLEVEL: 0-NO PAIN

## 2024-08-09 ENCOUNTER — OFFICE VISIT (OUTPATIENT)
Dept: OTOLARYNGOLOGY | Facility: HOSPITAL | Age: 39
End: 2024-08-09
Payer: COMMERCIAL

## 2024-08-09 VITALS — TEMPERATURE: 97.2 F | HEIGHT: 68 IN | BODY MASS INDEX: 20.43 KG/M2 | WEIGHT: 134.8 LBS

## 2024-08-09 DIAGNOSIS — C10.9 MALIGNANT NEOPLASM OF OROPHARYNX (MULTI): ICD-10-CM

## 2024-08-09 PROCEDURE — 3008F BODY MASS INDEX DOCD: CPT | Performed by: OTOLARYNGOLOGY

## 2024-08-09 PROCEDURE — 31575 DIAGNOSTIC LARYNGOSCOPY: CPT | Performed by: OTOLARYNGOLOGY

## 2024-08-09 PROCEDURE — 99213 OFFICE O/P EST LOW 20 MIN: CPT | Performed by: OTOLARYNGOLOGY

## 2024-08-09 PROCEDURE — 1036F TOBACCO NON-USER: CPT | Performed by: OTOLARYNGOLOGY

## 2024-08-09 ASSESSMENT — PAIN SCALES - GENERAL: PAINLEVEL: 0-NO PAIN

## 2024-08-09 NOTE — PROGRESS NOTES
ENT New Patient Visit    Chief complaint: tongue mass    History Of Present Illness  Pilo Hanks is a 39 y.o. male presents for evaluation of a left tongue mass.  He noticed it while brushing his teeth 2 weeks ago. No pain or bleeding but does feel like there is something in his throat. No trouble swallowing.    3/29/24 virtual visit: completed CT scans today, biopsy showed p16+ scca. He had no bleeding  8/9/24 FU: completed chemorad. Required an NG tube for a bit, no pEG. Lost about 30 lbs, has not gained too much back yet. No pain , no trouble swallowing. Expected taste changes and dry mouth     Past Medical History  He has a past medical history of Acute tonsillitis, unspecified (01/21/2016), Hypertrophy of tonsils (01/11/2017), Hypertrophy of tonsils (01/05/2017), Other specified health status, Pain in right toe(s) (09/14/2021), Pain in unspecified ankle and joints of unspecified foot (09/15/2021), Personal history of other diseases of the nervous system and sense organs (07/27/2021), Personal history of other diseases of the respiratory system (07/27/2021), Personal history of other diseases of the respiratory system (01/21/2016), Personal history of other diseases of the respiratory system (01/05/2017), Personal history of other specified conditions (07/27/2021), Sprain of joints and ligaments of unspecified parts of neck, initial encounter (08/21/2021), and Sprain of unspecified ligament of right ankle, initial encounter (03/26/2017).    Surgical History  He has a past surgical history that includes Mouth surgery (01/11/2017) and Vasectomy (Bilateral, 2023).     Social History  He reports that he quit smoking about 19 months ago. His smoking use included cigarettes. He started smoking about 16 years ago. He has a 3.8 pack-year smoking history. He has never used smokeless tobacco. He reports that he does not currently use alcohol. He reports current drug use. Drug: Marijuana.    Family History  Family History    Problem Relation Name Age of Onset    No Known Problems Mother      No Known Problems Father      Diabetes Paternal Grandfather      Heart failure Paternal Grandfather      Testicular cancer Other Maternal Cousin 40 - 49    Prostate cancer Other Paternal uncle         Allergies  Patient has no known allergies.    Review of Systems     Physical Exam:  ON exam  NAD alert  Marci eomi NCAT  B/l eac and Tms clear  Nasal cavity clear  Oc/op: no masses or lesions, prior left base of tongue mass no longer visible  Neck without masses or PHANI    Procedure Note: Flexible Nasolaryngoscopy  Verbal informed consent was obtained from the patient/patient's guardian. 4% lidocaine mixed with phenylephrine was prepared and dripped into the nose. It was placed in the right naris. Following an appropriate amount of time to allow for adequate anesthesia, a flexible fiberoptic nasolaryngoscope was placed into the patient's right naris. The nasal cavity, nasopharynx, oropharynx, hypopharynx, and all endolaryngeal structures were visualized and were normal except as listed below. Significant findings included:  -small symmetric residual lingual tonsils b/l  -both cords mobile  -some scarring of the left pharynx to the base of tongue,  -mild edema  -no masses or lesions     Assessment and Plan  39 y.o. male with a left base of tongue invasive scca p16+ T1N2M0  Completed chemorad    -good clinical response, HUI  -to have posttx pet 8/29, will follow up  -RTC in 2-3 months for routine surveillance        Phi Cruz MD

## 2024-08-26 ENCOUNTER — TELEPHONE (OUTPATIENT)
Dept: RADIATION ONCOLOGY | Facility: HOSPITAL | Age: 39
End: 2024-08-26
Payer: COMMERCIAL

## 2024-08-27 ASSESSMENT — ENCOUNTER SYMPTOMS
LEG SWELLING: 0
FEVER: 0
CHILLS: 0
COUGH: 0
ARTHRALGIAS: 0
VOMITING: 0
NUMBNESS: 0
TROUBLE SWALLOWING: 0
SORE THROAT: 0
NAUSEA: 0
LIGHT-HEADEDNESS: 0
HEADACHES: 0
ABDOMINAL PAIN: 0
SHORTNESS OF BREATH: 0
CONSTIPATION: 0

## 2024-08-27 NOTE — PROGRESS NOTES
Patient ID: Pilo Hanks is a 39 y.o. male.  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 Phi Cruz  MedOnc:  Dr. Kyunghee Burkitt/Shaila Fitzgerald PA-C  RadOnc: Dr. Raquel Blackman    Current Therapy  4/16 - 5/28/24: concurrent concurrent chemoRT with 2 doses of HD cisplatin and 70 gy RT    Sites of Disease  Left BOT  B/L cervical LNs    Oncologic Issues    ONCOLOGIC HISTORY  - 02/2024: Presented to PCP with mass in left tonsillar area. Referred to ENT  - 3/15/24: In office biopsy of left BOT w/ Dr Cruz.  Path showed p16 positive SCC  - 3/13/29/24: CT neck and chest showed a soft tissue mass within the left oropharynx centered in the anterior tonsillar pillar/left base of tongue, measures approximately 1.8 x 1.4cm.  - 4/1/24: PET/CT showed multiple hypermetabolic bilateral multilevel cervical lymphadenopathy, largest of which located at left cervical level 2, ~1.2cm.  - 4/5/24 [facility]: Rec surgical resection vs chemoRT vs clinical trials , [id number]    Admission  5/27 - 6/1 for worsening dysphagia and odynophagia with likely candidal esophagitis. NG was placed and pt cleared for soft solids and mildly thickened fluids.     Past Medical History:   Past Medical History:  01/21/2016: Acute tonsillitis, unspecified      Comment:  Acute tonsillitis, unspecified etiology  01/11/2017: Hypertrophy of tonsils      Comment:  Lingual tonsil hypertrophy  01/05/2017: Hypertrophy of tonsils      Comment:  Tonsillar hypertrophy  No date: Other specified health status      Comment:  No pertinent past medical history  09/14/2021: Pain in right toe(s)      Comment:  Great toe pain, right  09/15/2021: Pain in unspecified ankle and joints of unspecified foot      Comment:  Ankle pain  07/27/2021: Personal history of other diseases of the nervous system   and sense organs      Comment:  History of obstructive sleep apnea  07/27/2021:  Personal history of other diseases of the respiratory   system      Comment:  History of acute pharyngitis  2016: Personal history of other diseases of the respiratory   system      Comment:  History of sore throat  2017: Personal history of other diseases of the respiratory   system      Comment:  History of airway obstruction  2021: Personal history of other specified conditions      Comment:  History of chest pain  2021: Sprain of joints and ligaments of unspecified parts of   neck, initial encounter      Comment:  Neck sprain  2017: Sprain of unspecified ligament of right ankle, initial   encounter      Comment:  Sprain of right ankle, unspecified ligament, initial                encounter   Surgical History:    Past Surgical History:   Procedure Laterality Date    MOUTH SURGERY  2017    Oral Surgery Tooth Extraction    VASECTOMY Bilateral       Family History:    Family History   Problem Relation Name Age of Onset    No Known Problems Mother      No Known Problems Father      Diabetes Paternal Grandfather      Heart failure Paternal Grandfather      Testicular cancer Other Maternal Cousin 40 - 49    Prostate cancer Other Paternal uncle      Family Oncology History:    Cancer-related family history includes Prostate cancer in an other family member; Testicular cancer (age of onset: 40 - 49) in an other family member.  Social History:    Social History     Tobacco Use    Smoking status: Former     Current packs/day: 0.00     Average packs/day: 0.3 packs/day for 15.0 years (3.8 ttl pk-yrs)     Types: Cigarettes     Start date:      Quit date:      Years since quittin.6    Smokeless tobacco: Never   Vaping Use    Vaping status: Never Used   Substance Use Topics    Alcohol use: Not Currently    Drug use: Yes     Types: Marijuana     Comment: Edibles          Subjective   Chief Complaint: Squamous Cell Carcinoma of left base of tongue    HPI  Pilo Hanks is a 39 y.o.  male with recent diagnosis of T1N2M0 P16+ Squamous Cell Carcinoma of left BOT. Who  completed chemoRT w/ 2 cycles HD Cis and 70gy RT on 5/28/24.    Interval History  Patient presents for 3 month follow up.    Patient is recovering well. He still has intermittent sore throat for which he takes tylenol. Tolerating regular diet.  Taste is improving though still unable to taste sweetness and is sensitive to spices. Still taking Zinc 2-3x a day  Has chronic dry mouth, does not much saliva production  He reports some tinging in finger of right hand, in the first 3 digits.  Denies tinnitus.  Bowels are moving well, not requiring laxatives.     ROS  Review of Systems   Constitutional:  Negative for chills and fever.   HENT:   Negative for hearing loss, lump/mass, mouth sores, nosebleeds, sore throat, tinnitus and trouble swallowing.    Respiratory:  Negative for cough and shortness of breath.    Cardiovascular:  Negative for chest pain and leg swelling.   Gastrointestinal:  Negative for abdominal pain, constipation, nausea and vomiting.   Musculoskeletal:  Negative for arthralgias.   Skin:  Negative for rash.   Neurological:  Negative for headaches, light-headedness and numbness.       Allergies  No Known Allergies     Medications  Current Outpatient Medications   Medication Instructions    allopurinol (ZYLOPRIM) 100 mg, oral, Daily    ascorbic acid (VITAMIN C) 1,000 mg, oral, Daily    colchicine 0.6 mg tablet Use 2 tbs at the sign of gout PO can repeat in 1 hr if still with pain X 1    glycopyrrolate (ROBINUL) 2 mg, oral, 3 times daily    pantoprazole (PROTONIX) 40 mg, oral, Daily, Do not crush, chew, or split.    zinc sulfate (Zincate) 220 (50 Zn) MG capsule 50 mg of elemental zinc, oral, 3 times daily        Objective   VS: Wt 63.2 kg (139 lb 5.3 oz)   BMI 21.19 kg/m²   Weight: Daily Weight  08/29/24 : 62.9 kg (138 lb 11.2 oz)  08/29/24 : 63.2 kg (139 lb 5.3 oz)  08/09/24 : 61.1 kg (134 lb 12.8 oz)  08/01/24 : 63.4 kg  (139 lb 12.4 oz)  06/27/24 : 64.8 kg (142 lb 13.7 oz)  06/06/24 : 63.4 kg (139 lb 12.4 oz)  06/06/24 : 63.4 kg (139 lb 12.4 oz)      Physical Exam  Vitals reviewed.   Constitutional:       Appearance: Normal appearance. He is well-developed.   HENT:      Head: Normocephalic and atraumatic.      Right Ear: Hearing and external ear normal.      Left Ear: Hearing and external ear normal.      Nose: Nose normal.      Mouth/Throat:      Lips: Pink.      Mouth: Mucous membranes are moist. Oral lesions present.      Tongue: No lesions.      Palate: No mass.      Comments: Mucositis in left posterior oropharynx  Eyes:      General: Lids are normal.      Extraocular Movements: Extraocular movements intact.      Pupils: Pupils are equal, round, and reactive to light.   Neck:      Thyroid: No thyroid mass.   Cardiovascular:      Rate and Rhythm: Normal rate and regular rhythm.      Pulses: Normal pulses.   Pulmonary:      Effort: Pulmonary effort is normal.      Breath sounds: Normal breath sounds.   Abdominal:      General: Bowel sounds are normal.   Musculoskeletal:         General: Normal range of motion.      Cervical back: Full passive range of motion without pain and normal range of motion. No pain with movement or muscular tenderness.   Lymphadenopathy:      Cervical: No cervical adenopathy.      Right cervical: No superficial cervical adenopathy.     Left cervical: No superficial cervical adenopathy.   Skin:     General: Skin is warm.   Neurological:      General: No focal deficit present.      Mental Status: He is alert and oriented to person, place, and time.   Psychiatric:         Mood and Affect: Mood normal.         Behavior: Behavior is cooperative.         Diagnostic Results   Labs  Below labs are reviewed today.  Results from last 7 days   Lab Units 08/29/24  0901   WBC AUTO x10*3/uL 3.9*   HEMOGLOBIN g/dL 12.1*   HEMATOCRIT % 35.9*   PLATELETS AUTO x10*3/uL 226   NEUTROS ABS x10*3/uL 2.91   LYMPHS ABS AUTO  x10*3/uL 0.48*   MONOS ABS AUTO x10*3/uL 0.33   EOS ABS AUTO x10*3/uL 0.16   NEUTROS PCT AUTO % 74.4   LYMPHS PCT AUTO % 12.3   MONOS PCT AUTO % 8.4   EOS PCT AUTO % 4.1          Results from last 7 days   Lab Units 08/29/24  0901   GLUCOSE mg/dL 78   SODIUM mmol/L 139   POTASSIUM mmol/L 4.7   CHLORIDE mmol/L 101   CO2 mmol/L 28   BUN mg/dL 18   CREATININE mg/dL 1.32*   EGFR mL/min/1.73m*2 70   CALCIUM mg/dL 9.1   ALBUMIN g/dL 4.5   PROTEIN TOTAL g/dL 7.8   BILIRUBIN TOTAL mg/dL 0.3   ALK PHOS U/L 39   ALT U/L 20   AST U/L 13         Results from last 7 days   Lab Units 08/29/24  0901   TSH mIU/L 2.01                 Images  3/29/24 CT neck w/ contrast:   IMPRESSION:  Ill-defined soft tissue prominence within the left oropharynx centered in the anterior tonsillar pillar/left base of tongue, may measure approximately 1.8 cm, and likely corresponds to lesion visualized on clinical exam. No involvement of the parapharyngeal  spaces or osseous involvement.  Soft tissue filling the vallecula is thought to represent lingual tonsil.  Bilateral prominent cervical lymph nodes, largest in left level 2A measuring 1.2 cm. No enlarged lymph nodes by size criteria.     3/29/24 CT chest w/o contrast:   IMPRESSION:  1. There is a 3 mm left lower lobe nodule and a 2-3 mm left upper lobe nodule.  2. No evidence for lymphadenopathy. Probable hepatic cysts as described.  3. Incidental note is made of residual thymic tissue within the anterior mediastinum.     4/1/24 PET CT:  IMPRESSION:  1. Ill-defined large focal hypermetabolic soft tissue lesion centered in the anterior tonsillar pillar/left base of tongue bulging into the oropharynx, consistent with biopsy-proven squamous cell carcinoma.  2. Hypermetabolic focus at right tongue base, which could be physiologic, although neoplastic disease can not be excluded.  3. Multiple hypermetabolic bilateral multilevel cervical lymphadenopathy, largest of which located at left  "cervical level 2, as detailed above, compatible with locoregional metastatic omer  disease.  4. No evidence of hypermetabolic distant metastatic disease.    08/29/24 CT SOFT TISSUE NECK W IV CONTRAST  Interval resolution of enhancing lesion in the left oropharynx, vconsistent with treatment response. Persistent prominent lingual tonsil noted.    Interval decreased size of left-sided cervical lymph nodes, consistent with treatment response. No cervical lymphadenopathy by size criteria.    08/29/24 NM PET CT Whole Body  IMPRESSION:  1. Decreased but persistent FDG uptake is seen in the oropharynx, compatible with residual viable neoplasm.  2. Interval resolution of the previously noted hypermetabolic cervical lymph nodes.  3. No hypermetabolic malignancy elsewhere.    Pathology  No results found for: \"KBXHX\", \"PATHREP\", \"INTERP\", \"ADD1\", \"ADD2\", \"ADD3\", \"CORRECTIONHX\", \"ASTUDIES\", \"ANCILLARY1\", \"ANCILLARY2\", \"FINALINTERP\", \"COMDX\"      Assessment/Plan   Mr. Hanks is 38 y/o male with recent diagnosis of T2N2M0, P16+  left base of tongue squamous cell carcinoma who completed concurrent concurrent chemoRT with 2 doses of HD cisplatin and 70 gy RT on 5/28/24.    # Stage II (T1N2M0) p16 positive left base of tongue squamous cell carcinoma   - PET/CT showed uptake in left base of tongue, measures approximately 1.8 x 1.4cm as well as multiple hypermetabolic bilateral multilevel cervical lymphadenopathy, largest of which located at left cervical level 2, ~1.2cm.  - GFR >90 (3/25/24 labs), no hearing deficits  - Discussed with patient about SOC (concurrent chemorad with high dose cisplatin) vs clinical trial (: High dose cisplatin vs weekly cis or [id number]: CRT followed by Nivolumab), patient might be interested in clinical trial, he will think about it over the weekend and let us know.  - 4/16/24: Patient started on chemoRT w/ HD Cisplatin. Feeling well, left tongue mass not subjectively increased in size. Mild dysphagia. " Patient is deciding on 6 vs 7 weeks of RT and will get back to us. Patient is young (38yo) and has 3 children ages 3, 9 and 16 at home. Will refer him to Chaya Augustine NP for young adult services.    - 4/25/24: post chemo he experienced hiccups, likely from dex, has few episodes of diarrhea but resolved.  Otherwise, tolerated chemo well.  Cre 1.48 GFR 61 (baseline 73-83), improved from 4/19 labs, but will give 1L hydration today.  - 5/7/24: Patient tolerated C1 well but had some decrease in renal function, and hiccups that were not resolved with Baclofen. He had very good response so far as his left BOT mass is no longer visit. He does have mucositis in this area and has increasing odynophagia. Continue current pain regimen, continue Boost VHC x2/day.  - 6/6/24: Admitted 5/27 - 6/1 for worsening dysphagia and odynophagia with likely candidal esophagitis. NG was placed and pt cleared for soft solids and mildly thickened fluids. He still has mucositis evident is oropharynx w/odynophagia and dysphagia, taking Gabapentin  800mg TID.   - 6/27/24: Patient doing well 1 month from treatment. Odynophagia is largely resolved and he's tolerating a regular diet and ready for NG to be removed. Had 3 lb weight gain in 3 weeks. Still has lingering SE such as xerostomia, dysgeusia but are improving.   - 8/29/24: Patient doing well 3 months from treatment. Odynophagia/dysphagia largely resolved, tolerating regular diet. Has chronic dry mouth and dysgeusia. Energy is improved. Mild stiffness in the jaw and neck muscles. Restaging scans today, pending results.     # Right hand numbness  - Numbness/tinging in first 3 digits, could be carpal tunnel syndrome. Patient is an  and is using keyboard and mouse frequently. Suggest wearing wrist brace at night.    # Dysgeusia  - 8/1/24: improving, still unable to taste sweetness and is sensitive to spices. Will continue Zinc Sulfate 50mg TID    # DELON  - During admission, Cr around 1.4.    - 6/6/: Cr 1.47, GFR 61. 1L NS given for hydration  - 6/27: Cr 1.34, GFR 69. Kidney function improving. Patient reports he's drinking plenty of water. Will continue to encourage PO intake. If throat pain occurs, suggest Tylenol over NSAIDs  - 8/1: Cr 1.35, GFR 68. Renal function about the same as 2 months ago. Patient reports drinking about 60oz fluids PO. This may be patients new baseline kidney function.   - 8/27: Cr 1.32, GFR 70.       PLAN  -- 10/18 FUV w/ Dr Cruz  -- 10/31 FUV w/ MedOnc and RadOnc, labs cbc, cmp   -- 11/19 FUV w/ NP Fawn   -- Continue zinc sulfate 50mg TID for dysgeusia  -- Continue ample PO hydration

## 2024-08-29 ENCOUNTER — HOSPITAL ENCOUNTER (OUTPATIENT)
Dept: RADIOLOGY | Facility: HOSPITAL | Age: 39
Discharge: HOME | End: 2024-08-29
Payer: COMMERCIAL

## 2024-08-29 ENCOUNTER — LAB (OUTPATIENT)
Dept: LAB | Facility: HOSPITAL | Age: 39
End: 2024-08-29
Payer: COMMERCIAL

## 2024-08-29 ENCOUNTER — OFFICE VISIT (OUTPATIENT)
Dept: HEMATOLOGY/ONCOLOGY | Facility: HOSPITAL | Age: 39
End: 2024-08-29
Payer: COMMERCIAL

## 2024-08-29 ENCOUNTER — HOSPITAL ENCOUNTER (OUTPATIENT)
Dept: RADIATION ONCOLOGY | Facility: HOSPITAL | Age: 39
Setting detail: RADIATION/ONCOLOGY SERIES
Discharge: HOME | End: 2024-08-29
Payer: COMMERCIAL

## 2024-08-29 VITALS
TEMPERATURE: 95.2 F | BODY MASS INDEX: 21.09 KG/M2 | OXYGEN SATURATION: 100 % | DIASTOLIC BLOOD PRESSURE: 83 MMHG | RESPIRATION RATE: 18 BRPM | HEART RATE: 58 BPM | WEIGHT: 138.7 LBS | SYSTOLIC BLOOD PRESSURE: 134 MMHG

## 2024-08-29 VITALS — BODY MASS INDEX: 21.19 KG/M2 | WEIGHT: 139.33 LBS

## 2024-08-29 DIAGNOSIS — C10.9 MALIGNANT NEOPLASM OF OROPHARYNX (MULTI): ICD-10-CM

## 2024-08-29 DIAGNOSIS — R20.0 HAND NUMBNESS: ICD-10-CM

## 2024-08-29 DIAGNOSIS — Z92.3 HISTORY OF HEAD AND NECK RADIATION: ICD-10-CM

## 2024-08-29 DIAGNOSIS — Z08 ENCOUNTER FOR FOLLOW-UP SURVEILLANCE OF HEAD AND NECK CANCER: ICD-10-CM
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
## 2024-08-29 DIAGNOSIS — C10.9 MALIGNANT NEOPLASM OF OROPHARYNX (MULTI): Primary | ICD-10-CM

## 2024-08-29 DIAGNOSIS — Z85.89 ENCOUNTER FOR FOLLOW-UP SURVEILLANCE OF HEAD AND NECK CANCER: ICD-10-CM

## 2024-08-29 DIAGNOSIS — R13.10 ODYNOPHAGIA: ICD-10-CM

## 2024-08-29 DIAGNOSIS — K11.7 XEROSTOMIA DUE TO RADIOTHERAPY: ICD-10-CM

## 2024-08-29 LAB
ALBUMIN SERPL BCP-MCNC: 4.5 G/DL (ref 3.4–5)
ALP SERPL-CCNC: 39 U/L (ref 33–120)
ALT SERPL W P-5'-P-CCNC: 20 U/L (ref 10–52)
ANION GAP SERPL CALC-SCNC: 15 MMOL/L (ref 10–20)
AST SERPL W P-5'-P-CCNC: 13 U/L (ref 9–39)
BASOPHILS # BLD AUTO: 0.02 X10*3/UL (ref 0–0.1)
BASOPHILS NFR BLD AUTO: 0.5 %
BILIRUB SERPL-MCNC: 0.3 MG/DL (ref 0–1.2)
BUN SERPL-MCNC: 18 MG/DL (ref 6–23)
CALCIUM SERPL-MCNC: 9.1 MG/DL (ref 8.6–10.3)
CHLORIDE SERPL-SCNC: 101 MMOL/L (ref 98–107)
CO2 SERPL-SCNC: 28 MMOL/L (ref 21–32)
CREAT SERPL-MCNC: 1.32 MG/DL (ref 0.5–1.3)
EGFRCR SERPLBLD CKD-EPI 2021: 70 ML/MIN/1.73M*2
EOSINOPHIL # BLD AUTO: 0.16 X10*3/UL (ref 0–0.7)
EOSINOPHIL NFR BLD AUTO: 4.1 %
ERYTHROCYTE [DISTWIDTH] IN BLOOD BY AUTOMATED COUNT: 12.6 % (ref 11.5–14.5)
GLUCOSE BLD MANUAL STRIP-MCNC: 69 MG/DL (ref 74–99)
GLUCOSE SERPL-MCNC: 78 MG/DL (ref 74–99)
HCT VFR BLD AUTO: 35.9 % (ref 41–52)
HGB BLD-MCNC: 12.1 G/DL (ref 13.5–17.5)
IMM GRANULOCYTES # BLD AUTO: 0.01 X10*3/UL (ref 0–0.7)
IMM GRANULOCYTES NFR BLD AUTO: 0.3 % (ref 0–0.9)
LYMPHOCYTES # BLD AUTO: 0.48 X10*3/UL (ref 1.2–4.8)
LYMPHOCYTES NFR BLD AUTO: 12.3 %
MCH RBC QN AUTO: 31.8 PG (ref 26–34)
MCHC RBC AUTO-ENTMCNC: 33.7 G/DL (ref 32–36)
MCV RBC AUTO: 95 FL (ref 80–100)
MONOCYTES # BLD AUTO: 0.33 X10*3/UL (ref 0.1–1)
MONOCYTES NFR BLD AUTO: 8.4 %
NEUTROPHILS # BLD AUTO: 2.91 X10*3/UL (ref 1.2–7.7)
NEUTROPHILS NFR BLD AUTO: 74.4 %
NRBC BLD-RTO: 0 /100 WBCS (ref 0–0)
PLATELET # BLD AUTO: 226 X10*3/UL (ref 150–450)
POTASSIUM SERPL-SCNC: 4.7 MMOL/L (ref 3.5–5.3)
PROT SERPL-MCNC: 7.8 G/DL (ref 6.4–8.2)
RBC # BLD AUTO: 3.8 X10*6/UL (ref 4.5–5.9)
SODIUM SERPL-SCNC: 139 MMOL/L (ref 136–145)
TSH SERPL-ACNC: 2.01 MIU/L (ref 0.44–3.98)
WBC # BLD AUTO: 3.9 X10*3/UL (ref 4.4–11.3)

## 2024-08-29 PROCEDURE — 99215 OFFICE O/P EST HI 40 MIN: CPT | Performed by: STUDENT IN AN ORGANIZED HEALTH CARE EDUCATION/TRAINING PROGRAM

## 2024-08-29 PROCEDURE — 99215 OFFICE O/P EST HI 40 MIN: CPT

## 2024-08-29 PROCEDURE — 78816 PET IMAGE W/CT FULL BODY: CPT

## 2024-08-29 PROCEDURE — 82947 ASSAY GLUCOSE BLOOD QUANT: CPT

## 2024-08-29 PROCEDURE — 70491 CT SOFT TISSUE NECK W/DYE: CPT

## 2024-08-29 PROCEDURE — 2550000001 HC RX 255 CONTRASTS

## 2024-08-29 PROCEDURE — 84443 ASSAY THYROID STIM HORMONE: CPT

## 2024-08-29 PROCEDURE — A9552 F18 FDG: HCPCS

## 2024-08-29 PROCEDURE — 31575 DIAGNOSTIC LARYNGOSCOPY: CPT

## 2024-08-29 PROCEDURE — 3430000001 HC RX 343 DIAGNOSTIC RADIOPHARMACEUTICALS

## 2024-08-29 PROCEDURE — 85025 COMPLETE CBC W/AUTO DIFF WBC: CPT

## 2024-08-29 PROCEDURE — 84075 ASSAY ALKALINE PHOSPHATASE: CPT

## 2024-08-29 PROCEDURE — 36415 COLL VENOUS BLD VENIPUNCTURE: CPT

## 2024-08-29 RX ORDER — FLUDEOXYGLUCOSE F 18 200 MCI/ML
13.64 INJECTION, SOLUTION INTRAVENOUS
Status: COMPLETED | OUTPATIENT
Start: 2024-08-29 | End: 2024-08-29

## 2024-08-29 RX ORDER — LIDOCAINE HYDROCHLORIDE 20 MG/ML
JELLY TOPICAL
Status: DISCONTINUED
Start: 2024-08-29 | End: 2024-08-29 | Stop reason: HOSPADM

## 2024-08-29 ASSESSMENT — ENCOUNTER SYMPTOMS
DIARRHEA: 0
SORE THROAT: 0
BLOOD IN STOOL: 0
BACK PAIN: 0
OCCASIONAL FEELINGS OF UNSTEADINESS: 0
DIFFICULTY URINATING: 0
VOMITING: 0
HEADACHES: 0
COUGH: 0
SHORTNESS OF BREATH: 0
CONSTIPATION: 0
NECK PAIN: 0
LOSS OF SENSATION IN FEET: 0
NAUSEA: 0
CHILLS: 0
EXTREMITY WEAKNESS: 0
EYE PROBLEMS: 0
LEG SWELLING: 0
NUMBNESS: 0
DIZZINESS: 0
WHEEZING: 0
SPEECH DIFFICULTY: 0
TROUBLE SWALLOWING: 0
FATIGUE: 0
ABDOMINAL PAIN: 0
SEIZURES: 0
ABDOMINAL DISTENTION: 0
NERVOUS/ANXIOUS: 0
HEMATURIA: 0
CONFUSION: 0
ARTHRALGIAS: 0
NECK STIFFNESS: 0
DEPRESSION: 0

## 2024-08-29 ASSESSMENT — COLUMBIA-SUICIDE SEVERITY RATING SCALE - C-SSRS
2. HAVE YOU ACTUALLY HAD ANY THOUGHTS OF KILLING YOURSELF?: NO
6. HAVE YOU EVER DONE ANYTHING, STARTED TO DO ANYTHING, OR PREPARED TO DO ANYTHING TO END YOUR LIFE?: NO
1. IN THE PAST MONTH, HAVE YOU WISHED YOU WERE DEAD OR WISHED YOU COULD GO TO SLEEP AND NOT WAKE UP?: NO

## 2024-08-29 ASSESSMENT — PATIENT HEALTH QUESTIONNAIRE - PHQ9
SUM OF ALL RESPONSES TO PHQ9 QUESTIONS 1 AND 2: 0
2. FEELING DOWN, DEPRESSED OR HOPELESS: NOT AT ALL
1. LITTLE INTEREST OR PLEASURE IN DOING THINGS: NOT AT ALL

## 2024-08-29 ASSESSMENT — PAIN SCALES - GENERAL
PAINLEVEL: 0-NO PAIN
PAINLEVEL: 0-NO PAIN

## 2024-08-29 NOTE — PROGRESS NOTES
Radiation Oncology Follow-Up    Patient Name:  Pilo Hanks  MRN:  [id number]  :  1985    Referring Provider: Kenan Obrien, *  Primary Care Provider: Ruth Villa DO  Care Team: Patient Care Team:  Ruth Villa DO as PCP - General  Ruth Villa DO as PCP - [facility] PCP  Raquel Blackman MD as Radiation Oncologist (Radiation Oncology)  Kyunghee Burkitt, DO as Consulting Physician (Hematology and Oncology)  Rosina Fitzgerald PA-C as Physician Assistant (Hematology and Oncology)  Mary Ellsworth RN as Registered Nurse (Hematology and Oncology)    Date of Service: 2024    Diagnosis: Squamous Cell Carcinoma of left base of tongue, P16+  Staging: Stage II, T1N2M0    Sites of Disease  Left BOT  B/L cervical LNs     Oncologic Issues     ONCOLOGIC HISTORY  - 2024: Presented to PCP with mass in left tonsillar area. Referred to ENT  - 3/15/24: In office biopsy of left BOT w/ Dr Cruz.  Path showed p16 positive SCC  - 3/13/29/24: CT neck and chest showed a soft tissue mass within the left oropharynx centered in the anterior tonsillar pillar/left base of tongue, measures approximately 1.8 x 1.4cm.  - 24: PET/CT showed multiple hypermetabolic bilateral multilevel cervical lymphadenopathy, largest of which located at left cervical level 2, ~1.2cm.  - 24 HNTB: Rec surgical resection vs chemoRT vs clinical trials , [id number]    Treatment:   Chemoradiation (HD Cisplatin and VMAT-- 70Gy in 35 fx) ending on 24.     NavDx:   --24 positive.  --24 negative.   --24 pending (mobile).    SUBJECTIVE  History of Present Illness:  Pilo Hanks is a 39 y.o. male who was previously seen at the [facility] Department of Radiation Oncology for SCC of the left BOT followed by Chemoradiation (HD Cisplatin and VMAT-- 70Gy in 35 fx) ending on 24.  Patient with recent hospitalization for dysphagia and malnutrition.  Saw SLP and was cleared for Soft solids with  "mildy thickened liquids. Candida noted in oropharynx on exam raising concern for candidal esophagitis vs radiation esophagitis or a mixed picture. Started on PPI, Sucralfate, and oral Fluconazole.  A Dobhoff was also placed, as a temporary measure to provide nutritional support.   Today the patient states that he's \"doing better\".   He's now getting most of his nutrition enterally (Boost VHC 3-4/day), however, he states that he's eating some soft foods.  Endorses that thin liquids such as water make him cough.  Denies choking while eating.  Endorses some significant dysgeusia and xerostomia. We discussed that he should get some taste and salivary function back over the next several months.   Continues to have odynophagia and is managing with Gabapentin.  He had been using BMX but didn't like the way the medication made him feel. Also endorses thick secretions.  A suction machine was ordered at this visit to help manage.  Heron new lumps/bumps/discolorations within his oral cavity or around his head/neck.   Denies chills, fever, SOB or chest pain.  Endorses slight fatigue and deconditioning.  Denies headaches, dizziness, instability, vision changes, hearing changes or focal sensory/motor deficits.  Denies abdominal pain, nausea, vomiting, or diarrhea.  Does c/o constipation.  We discussed, as did [facility], starting Miralax and the patient was agreeable.     8/29/24 Interval visit:   Today the patient states that he's doing well.  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  He continues to eat a regular diet without coughing or choking.  He continues to  endorse continued taste alterations, especially with sweet foods.  He also continues to have oral dryness and manages with hydration.  Denies dysphagia, mucositis, trismus or new lumps/bumps/discolorations in his mouth or around his head, neck or shoulders.  He does have some occasional soreness in the \"back of the tongue\".  Denies chills, fever, SOB or " chest pain.  Endorses slight fatigue and deconditioning.  Denies headaches, dizziness, instability, vision changes, hearing changes or focal sensory/motor deficits.  Denies abdominal pain, nausea, vomiting, constipation, or diarrhea.       8/29/24 Flexible fiberoptic laryngopharyngoscopy  Performed via the nares, after topical anesthesia and decongestant was instilled in the right nares: Nasopharynx: There are no noted lesions in the nasopharynx. The bilateral torus  tubari and fossa of Rosenmueller show no lesions and Eustachian tube orifice normal. The mucosal surface of nasopharynx is clear without appreciated masses. The nasopharyngeal surface of the palate has no abnormal lesions. Oropharynx: The tonsillar fossa appears normal bilaterally. The tongue base and vallecula show no lesions. The lingual surface of the epiglottis is normal. The oropharynx is clear of any masses. Larynx: The laryngeal surface of epiglottis, the aryepiglottic folds, the false cords, and the arytenoids are clear of any lesions. The vocal cords have normal mobility and there are no noted lesions. There are no lesions noted in the subglottis. Hypopharynx: The hypopharynx appears normal. There are no noted lesions in the  pyriform sinus or postcricoid area. Scope was removed.  Thick secretions were noted throughout the exam. The patient tolerated the procedure well.       Treatment Rendered:   IMRT: Bilateral Head and neck    Treatment Period Technique Fraction Dose Fractions Total Dose   Course 1 4/16/2024-5/24/2024  (days elapsed: 38)         BOT + nodes 4/16/2024-5/24/2024 VMAT 200 / 200 cGy 35 / 35 7000 / 7,000 cGy       Review of Systems:   Review of Systems   Constitutional:  Negative for chills and fatigue.   HENT:   Negative for hearing loss, mouth sores, sore throat, tinnitus and trouble swallowing.    Eyes:  Negative for eye problems.   Respiratory:  Negative for cough, shortness of breath and wheezing.    Cardiovascular:   "Negative for chest pain and leg swelling.   Gastrointestinal:  Negative for abdominal distention, abdominal pain, blood in stool, constipation, diarrhea, nausea and vomiting.   Genitourinary:  Negative for difficulty urinating and hematuria.    Musculoskeletal:  Negative for arthralgias, back pain, gait problem, neck pain and neck stiffness.   Neurological:  Negative for dizziness, extremity weakness, gait problem, headaches, numbness, seizures and speech difficulty.   Psychiatric/Behavioral:  Negative for confusion and depression. The patient is not nervous/anxious.      Performance Status:   The Karnofsky performance scale today is 100, Fully active, able to carry on all pre-disease performed without restriction (ECOG equivalent 0).    Pain:  Denies pain today.      OBJECTIVE  Vital Signs:      6/6/2024     8:52 AM 6/6/2024     9:39 AM 6/27/2024     9:05 AM 8/1/2024     8:56 AM 8/9/2024     9:51 AM 8/29/2024    11:27 AM 8/29/2024    12:16 PM   Vitals   Systolic 110 110 134 126  134    Diastolic 70 70 80 85  83    Heart Rate 88 88 84 89  58    Temp 36.9 °C (98.4 °F) 36.9 °C (98.4 °F) 36.7 °C (98.1 °F) 36.3 °C (97.3 °F) 36.2 °C (97.2 °F) 35.1 °C (95.2 °F)    Resp 18 18 18 18  18    Height (in)     1.727 m (5' 8\")     Weight (lb) 139.77 139.77 142.86 139.77 134.8 138.7 139.33   BMI 21.25 kg/m2 21.25 kg/m2 21.72 kg/m2 21.25 kg/m2 20.5 kg/m2 21.09 kg/m2 21.19 kg/m2   BSA (m2) 1.74 m2 1.74 m2 1.76 m2 1.74 m2 1.71 m2 1.74 m2 1.74 m2   Visit Report Report Report Report Report Report Report Report        Physical Exam  Constitutional:       General: He is not in acute distress.     Appearance: Normal appearance. He is normal weight. He is not ill-appearing, toxic-appearing or diaphoretic.   HENT:      Head: Normocephalic and atraumatic. No masses.      Jaw: Trismus present. No tenderness, swelling or pain on movement.      Comments:       Nose: Nose normal. No congestion or rhinorrhea.      Comments:       Mouth/Throat:    "   Mouth: Mucous membranes are moist. No oral lesions.      Dentition: Normal dentition. No dental caries or gum lesions.      Tongue: No lesions. Tongue does not deviate from midline.      Palate: No mass and lesions.      Pharynx: No pharyngeal swelling.   Eyes:      General: Lids are normal. Gaze aligned appropriately.      Extraocular Movements: Extraocular movements intact.      Pupils: Pupils are equal, round, and reactive to light.   Neck:      Thyroid: No thyroid mass or thyroid tenderness.   Cardiovascular:      Rate and Rhythm: Normal rate and regular rhythm.      Pulses: Normal pulses.      Heart sounds: Normal heart sounds. No murmur heard.  Pulmonary:      Effort: Pulmonary effort is normal. No tachypnea or respiratory distress.      Breath sounds: Normal breath sounds. No wheezing or rhonchi.   Abdominal:      General: Abdomen is flat. Bowel sounds are normal. There is no distension.      Palpations: Abdomen is soft. There is no mass.      Tenderness: There is no abdominal tenderness. There is no guarding or rebound.   Musculoskeletal:         General: No swelling, tenderness, deformity or signs of injury. Normal range of motion.      Cervical back: Full passive range of motion without pain, normal range of motion and neck supple. No rigidity or tenderness. No pain with movement. Normal range of motion.      Right lower leg: No edema.      Left lower leg: No edema.   Lymphadenopathy:      Head:      Right side of head: No submental, submandibular, tonsillar, preauricular, posterior auricular or occipital adenopathy.      Left side of head: No submental, submandibular, tonsillar, preauricular, posterior auricular or occipital adenopathy.      Cervical:      Right cervical: No superficial, deep or posterior cervical adenopathy.     Left cervical: No superficial, deep or posterior cervical adenopathy.   Skin:     General: Skin is warm and dry.      Capillary Refill: Capillary refill takes less than 2  seconds.      Coloration: Skin is not jaundiced.      Findings: No erythema, lesion or rash.   Neurological:      General: No focal deficit present.      Mental Status: He is alert and oriented to person, place, and time.      Cranial Nerves: No cranial nerve deficit.      Sensory: No sensory deficit.      Motor: No weakness.      Coordination: Coordination normal.      Gait: Gait normal.   Psychiatric:         Attention and Perception: Attention normal.         Mood and Affect: Mood normal.         Behavior: Behavior normal. Behavior is cooperative.     8/29/24 CT SOFT TISSUE NECK W IV CONTRAST   IMPRESSION:  Interval resolution of enhancing lesion in the left oropharynx,  consistent with treatment response. Persistent prominent lingual  tonsil noted.  Interval decreased size of left-sided cervical lymph nodes,  consistent with treatment response. No cervical lymphadenopathy by  size criteria.    NM PET CT WHOLE BODY; 8/29/2024 11:21 am   IMPRESSION:  1. Decreased but persistent FDG uptake is seen in the oropharynx,  compatible with residual viable neoplasm.  2. Interval resolution of the previously noted hypermetabolic  cervical lymph nodes.  3. No hypermetabolic malignancy elsewhere.    ASSESSMENT:   39 y.o. male who was previously seen at the [facility] Department of Radiation Oncology for SCC of the left BOT followed by Chemoradiation (HD Cisplatin and VMAT-- 70Gy in 35 fx) ending on 5/24/24.  Today the patient had a CT neck and PET which showed HUI.  This was discussed with the patient.  All questions/concerns were addressed to the satisfaction of the patient.     PLAN:      --FU with Laurent Obrien CNP on 10/30/24.   --NavDx today--mobile phlebotomy.   --Continue to follow with Dr. Burkitt and Shaila Fitzgerald PA-C for Medical Oncology management.   --Continue to follow with Dr. Cruz for ENT management.   --Continue to see your dentist at least 3 times/month.   --Continue to see  your PCP at least twice a year.        Please reach out with any questions/concerns.     NCCN Guidelines were applicable to guide this patients treatment plan.  Kenan Obrien, APRN-CNP

## 2024-08-29 NOTE — PATIENT INSTRUCTIONS
For right hand/finger numbness, try wearing a wrist brace at night and use wrist rests when using a mouse/keyboard.

## 2024-09-18 ENCOUNTER — TELEPHONE (OUTPATIENT)
Dept: RADIATION ONCOLOGY | Facility: HOSPITAL | Age: 39
End: 2024-09-18
Payer: COMMERCIAL

## 2024-09-18 NOTE — TELEPHONE ENCOUNTER
Spoke to the patient regarding 9/6/24 NavDx results.  Per a Navaris report, the patient is negative for HPV16.  All questions/concerns were addressed to the satisfaction of the patient.

## 2024-09-27 ENCOUNTER — LAB (OUTPATIENT)
Dept: LAB | Facility: LAB | Age: 39
End: 2024-09-27
Payer: COMMERCIAL

## 2024-09-27 ENCOUNTER — APPOINTMENT (OUTPATIENT)
Dept: PRIMARY CARE | Facility: CLINIC | Age: 39
End: 2024-09-27
Payer: COMMERCIAL

## 2024-09-27 VITALS
HEART RATE: 76 BPM | BODY MASS INDEX: 21.55 KG/M2 | WEIGHT: 142.2 LBS | TEMPERATURE: 97.6 F | RESPIRATION RATE: 14 BRPM | HEIGHT: 68 IN | OXYGEN SATURATION: 99 % | DIASTOLIC BLOOD PRESSURE: 76 MMHG | SYSTOLIC BLOOD PRESSURE: 104 MMHG

## 2024-09-27 DIAGNOSIS — E78.5 HYPERLIPIDEMIA, UNSPECIFIED HYPERLIPIDEMIA TYPE: ICD-10-CM

## 2024-09-27 DIAGNOSIS — Z11.59 ENCOUNTER FOR HEPATITIS C SCREENING TEST FOR LOW RISK PATIENT: ICD-10-CM

## 2024-09-27 DIAGNOSIS — M10.9 GOUT, UNSPECIFIED CAUSE, UNSPECIFIED CHRONICITY, UNSPECIFIED SITE: ICD-10-CM

## 2024-09-27 DIAGNOSIS — Z00.00 WELLNESS EXAMINATION: ICD-10-CM

## 2024-09-27 DIAGNOSIS — Z00.00 WELLNESS EXAMINATION: Primary | ICD-10-CM

## 2024-09-27 PROBLEM — J02.9 SORE THROAT: Status: ACTIVE | Noted: 2024-09-27

## 2024-09-27 PROBLEM — F17.200 NICOTINE DEPENDENCE: Status: ACTIVE | Noted: 2024-09-27

## 2024-09-27 PROBLEM — R07.9 CHEST PAIN: Status: ACTIVE | Noted: 2024-09-27

## 2024-09-27 PROBLEM — J98.8 RESPIRATORY OBSTRUCTION: Status: ACTIVE | Noted: 2024-09-27

## 2024-09-27 PROBLEM — R10.31 RIGHT INGUINAL PAIN: Status: RESOLVED | Noted: 2023-05-22 | Resolved: 2024-09-27

## 2024-09-27 PROBLEM — M25.571 ACUTE RIGHT ANKLE PAIN: Status: RESOLVED | Noted: 2023-05-22 | Resolved: 2024-09-27

## 2024-09-27 LAB
CHOLEST SERPL-MCNC: 239 MG/DL (ref 0–199)
CHOLESTEROL/HDL RATIO: 5
HCV AB SER QL: NONREACTIVE
HDLC SERPL-MCNC: 48 MG/DL
LDLC SERPL CALC-MCNC: 164 MG/DL
NON HDL CHOLESTEROL: 191 MG/DL (ref 0–149)
TRIGL SERPL-MCNC: 134 MG/DL (ref 0–149)
URATE SERPL-MCNC: 5.8 MG/DL (ref 4–7.5)
VLDL: 27 MG/DL (ref 0–40)

## 2024-09-27 PROCEDURE — 99396 PREV VISIT EST AGE 40-64: CPT | Performed by: FAMILY MEDICINE

## 2024-09-27 PROCEDURE — 84550 ASSAY OF BLOOD/URIC ACID: CPT

## 2024-09-27 PROCEDURE — 36415 COLL VENOUS BLD VENIPUNCTURE: CPT

## 2024-09-27 PROCEDURE — 86803 HEPATITIS C AB TEST: CPT

## 2024-09-27 PROCEDURE — 80061 LIPID PANEL: CPT

## 2024-09-27 PROCEDURE — 3008F BODY MASS INDEX DOCD: CPT | Performed by: FAMILY MEDICINE

## 2024-09-27 PROCEDURE — 1036F TOBACCO NON-USER: CPT | Performed by: FAMILY MEDICINE

## 2024-09-27 RX ORDER — COLCHICINE 0.6 MG/1
TABLET ORAL
Qty: 9 TABLET | Refills: 1 | Status: SHIPPED | OUTPATIENT
Start: 2024-09-27

## 2024-09-27 ASSESSMENT — PATIENT HEALTH QUESTIONNAIRE - PHQ9
1. LITTLE INTEREST OR PLEASURE IN DOING THINGS: NOT AT ALL
SUM OF ALL RESPONSES TO PHQ9 QUESTIONS 1 AND 2: 0
2. FEELING DOWN, DEPRESSED OR HOPELESS: NOT AT ALL

## 2024-09-27 NOTE — PATIENT INSTRUCTIONS
Continue to work on healthy diet and exercise  We encourage all patients to engage in exercise 150 minutes per week   Adults 18 and older, activity during each week - if you are able:    Engage in at least 150 minutes of moderate or vigorous exercise per week   If you are able- Engage in muscle strengthening activity on 2 or more days per week     Labs today    Please follow up in 1 yr for wellness or as needed.       ** If labs or imaging ordered at today's visit, all the non-urgent results will be discussed at your next visit    If you have been referred for a special test or to a specialist please call  [phone] to schedule an appointment.  If you have any further questions, or if develop new or worsened symptoms, please give our office a call at [phone].

## 2024-09-27 NOTE — PROGRESS NOTES
"Subjective   Patient ID: Pilo Hanks is a 39 y.o. male who presents for Annual Exam.    HPI     Here for a physical  Does not want a chaperone.     Other concerns/issues/followup:  1 - with Squamous Cell Carcinoma of left base of tongue. Followed by oncology  PET/CT  4/1/24 showed uptake in left base of tongue, measures approximately 1.8 x 1.4cm as well as multiple hypermetabolic bilateral multilevel cervical lymphadenopathy, largest of which located at left cervical level 2, ~1.2cm.     PET/CT 8/29/24 showed:  1. Decreased but persistent FDG uptake is seen in the oropharynx,  compatible with residual viable neoplasm.  2. Interval resolution of the previously noted hypermetabolic  cervical lymph nodes.  3. No hypermetabolic malignancy elsewhere.      [facility] was reviewed & updated.     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 Pupils are symmetric.   Ears, Nose, Mouth, and Throat: External ears and nose appear to be without deformity or rash. No lesions or masses noted. Hearing is grossly intact.   CV: RRR, no murmur  Carotids: no bruits  Thyroid nml  Pulm:CTA B/L  Abd: soft, NTTP, + BS  LE: no edema  Psychiatric: Alert, orientation to person, place, and time. Recent/remote memory as evidenced through face-to-face interaction and discussion appear grossly intact. Mood and affect are normal.        Assessment/Plan   Problem List Items Addressed This Visit             ICD-10-CM    Gout " M10.9    Relevant Medications    colchicine 0.6 mg tablet    Other Relevant Orders    Uric acid    HLD (hyperlipidemia) E78.5    Relevant Orders    Lipid Panel     Other Visit Diagnoses         Codes    Wellness examination    -  Primary Z00.00    Relevant Orders    Lipid Panel    Hepatitis C antibody    Uric acid    Encounter for hepatitis C screening test for low risk patient     Z11.59    Relevant Orders    Hepatitis C antibody            Please follow up in 1 yr for wellness or as needed.

## 2024-10-18 ENCOUNTER — APPOINTMENT (OUTPATIENT)
Dept: OTOLARYNGOLOGY | Facility: HOSPITAL | Age: 39
End: 2024-10-18
Payer: COMMERCIAL

## 2024-10-18 ENCOUNTER — APPOINTMENT (OUTPATIENT)
Dept: SPEECH THERAPY | Facility: HOSPITAL | Age: 39
End: 2024-10-18
Payer: COMMERCIAL

## 2024-10-28 ASSESSMENT — ENCOUNTER SYMPTOMS
COUGH: 0
NAUSEA: 0
TROUBLE SWALLOWING: 0
NUMBNESS: 0
SORE THROAT: 0
HEADACHES: 0
CHILLS: 0
FEVER: 0
ARTHRALGIAS: 0
LIGHT-HEADEDNESS: 0
SHORTNESS OF BREATH: 0
CONSTIPATION: 0
LEG SWELLING: 0
VOMITING: 0
ABDOMINAL PAIN: 0

## 2024-10-30 ENCOUNTER — APPOINTMENT (OUTPATIENT)
Dept: HEMATOLOGY/ONCOLOGY | Facility: HOSPITAL | Age: 39
End: 2024-10-30
Payer: COMMERCIAL

## 2024-10-30 ENCOUNTER — TELEPHONE (OUTPATIENT)
Dept: RADIATION ONCOLOGY | Facility: HOSPITAL | Age: 39
End: 2024-10-30
Payer: COMMERCIAL

## 2024-10-31 ENCOUNTER — HOSPITAL ENCOUNTER (OUTPATIENT)
Dept: RADIATION ONCOLOGY | Facility: HOSPITAL | Age: 39
Setting detail: RADIATION/ONCOLOGY SERIES
Discharge: HOME | End: 2024-10-31
Payer: COMMERCIAL

## 2024-10-31 ENCOUNTER — LAB (OUTPATIENT)
Dept: LAB | Facility: HOSPITAL | Age: 39
End: 2024-10-31
Payer: COMMERCIAL

## 2024-10-31 ENCOUNTER — OFFICE VISIT (OUTPATIENT)
Dept: HEMATOLOGY/ONCOLOGY | Facility: HOSPITAL | Age: 39
End: 2024-10-31
Payer: COMMERCIAL

## 2024-10-31 VITALS
TEMPERATURE: 97.5 F | OXYGEN SATURATION: 100 % | HEART RATE: 72 BPM | BODY MASS INDEX: 21.44 KG/M2 | DIASTOLIC BLOOD PRESSURE: 87 MMHG | SYSTOLIC BLOOD PRESSURE: 129 MMHG | WEIGHT: 141 LBS | RESPIRATION RATE: 18 BRPM

## 2024-10-31 VITALS
SYSTOLIC BLOOD PRESSURE: 129 MMHG | BODY MASS INDEX: 21.49 KG/M2 | OXYGEN SATURATION: 100 % | DIASTOLIC BLOOD PRESSURE: 87 MMHG | RESPIRATION RATE: 18 BRPM | HEART RATE: 72 BPM | WEIGHT: 141.31 LBS | TEMPERATURE: 97.5 F

## 2024-10-31 DIAGNOSIS — R43.2 DYSGEUSIA: ICD-10-CM

## 2024-10-31 DIAGNOSIS — Z08 ENCOUNTER FOR FOLLOW-UP SURVEILLANCE OF HEAD AND NECK CANCER: ICD-10-CM

## 2024-10-31 DIAGNOSIS — I89.0 LYMPHEDEMA DUE TO AND NOT CONCURRENT WITH IONIZING RADIOTHERAPY: ICD-10-CM

## 2024-10-31 DIAGNOSIS — Z85.89 ENCOUNTER FOR FOLLOW-UP SURVEILLANCE OF HEAD AND NECK CANCER: ICD-10-CM

## 2024-10-31 DIAGNOSIS — K11.7 XEROSTOMIA: ICD-10-CM
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
## 2024-10-31 DIAGNOSIS — K11.7 XEROSTOMIA DUE TO RADIOTHERAPY: ICD-10-CM
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
## 2024-10-31 DIAGNOSIS — C10.9 MALIGNANT NEOPLASM OF OROPHARYNX: ICD-10-CM

## 2024-10-31 DIAGNOSIS — C10.9 MALIGNANT NEOPLASM OF OROPHARYNX: Primary | ICD-10-CM

## 2024-10-31 LAB
ALBUMIN SERPL BCP-MCNC: 5 G/DL (ref 3.4–5)
ALP SERPL-CCNC: 40 U/L (ref 33–120)
ALT SERPL W P-5'-P-CCNC: 12 U/L (ref 10–52)
ANION GAP SERPL CALC-SCNC: 13 MMOL/L (ref 10–20)
AST SERPL W P-5'-P-CCNC: 12 U/L (ref 9–39)
BASOPHILS # BLD AUTO: 0.01 X10*3/UL (ref 0–0.1)
BASOPHILS NFR BLD AUTO: 0.2 %
BILIRUB SERPL-MCNC: 0.4 MG/DL (ref 0–1.2)
BUN SERPL-MCNC: 19 MG/DL (ref 6–23)
CALCIUM SERPL-MCNC: 9.6 MG/DL (ref 8.6–10.3)
CHLORIDE SERPL-SCNC: 99 MMOL/L (ref 98–107)
CO2 SERPL-SCNC: 28 MMOL/L (ref 21–32)
CREAT SERPL-MCNC: 1.19 MG/DL (ref 0.5–1.3)
EGFRCR SERPLBLD CKD-EPI 2021: 79 ML/MIN/1.73M*2
EOSINOPHIL # BLD AUTO: 0.07 X10*3/UL (ref 0–0.7)
EOSINOPHIL NFR BLD AUTO: 1.4 %
ERYTHROCYTE [DISTWIDTH] IN BLOOD BY AUTOMATED COUNT: 12.6 % (ref 11.5–14.5)
GLUCOSE SERPL-MCNC: 79 MG/DL (ref 74–99)
HCT VFR BLD AUTO: 38.5 % (ref 41–52)
HGB BLD-MCNC: 13 G/DL (ref 13.5–17.5)
IMM GRANULOCYTES # BLD AUTO: 0.02 X10*3/UL (ref 0–0.7)
IMM GRANULOCYTES NFR BLD AUTO: 0.4 % (ref 0–0.9)
LYMPHOCYTES # BLD AUTO: 0.63 X10*3/UL (ref 1.2–4.8)
LYMPHOCYTES NFR BLD AUTO: 12.7 %
MCH RBC QN AUTO: 30.1 PG (ref 26–34)
MCHC RBC AUTO-ENTMCNC: 33.8 G/DL (ref 32–36)
MCV RBC AUTO: 89 FL (ref 80–100)
MONOCYTES # BLD AUTO: 0.37 X10*3/UL (ref 0.1–1)
MONOCYTES NFR BLD AUTO: 7.5 %
NEUTROPHILS # BLD AUTO: 3.85 X10*3/UL (ref 1.2–7.7)
NEUTROPHILS NFR BLD AUTO: 77.8 %
NRBC BLD-RTO: 0 /100 WBCS (ref 0–0)
PLATELET # BLD AUTO: 262 X10*3/UL (ref 150–450)
POTASSIUM SERPL-SCNC: 4.2 MMOL/L (ref 3.5–5.3)
PROT SERPL-MCNC: 8.4 G/DL (ref 6.4–8.2)
RBC # BLD AUTO: 4.32 X10*6/UL (ref 4.5–5.9)
SODIUM SERPL-SCNC: 136 MMOL/L (ref 136–145)
WBC # BLD AUTO: 5 X10*3/UL (ref 4.4–11.3)

## 2024-10-31 PROCEDURE — 36415 COLL VENOUS BLD VENIPUNCTURE: CPT

## 2024-10-31 PROCEDURE — 85025 COMPLETE CBC W/AUTO DIFF WBC: CPT

## 2024-10-31 PROCEDURE — 99214 OFFICE O/P EST MOD 30 MIN: CPT

## 2024-10-31 PROCEDURE — 80053 COMPREHEN METABOLIC PANEL: CPT

## 2024-10-31 PROCEDURE — 1036F TOBACCO NON-USER: CPT | Performed by: STUDENT IN AN ORGANIZED HEALTH CARE EDUCATION/TRAINING PROGRAM

## 2024-10-31 PROCEDURE — 99215 OFFICE O/P EST HI 40 MIN: CPT | Performed by: STUDENT IN AN ORGANIZED HEALTH CARE EDUCATION/TRAINING PROGRAM

## 2024-10-31 RX ORDER — ZINC SULFATE 50(220)MG
50 CAPSULE ORAL 2 TIMES DAILY
Qty: 60 CAPSULE | Refills: 3 | Status: SHIPPED | OUTPATIENT
Start: 2024-10-31 | End: 2025-10-31

## 2024-10-31 ASSESSMENT — COLUMBIA-SUICIDE SEVERITY RATING SCALE - C-SSRS
6. HAVE YOU EVER DONE ANYTHING, STARTED TO DO ANYTHING, OR PREPARED TO DO ANYTHING TO END YOUR LIFE?: NO
1. IN THE PAST MONTH, HAVE YOU WISHED YOU WERE DEAD OR WISHED YOU COULD GO TO SLEEP AND NOT WAKE UP?: NO
2. HAVE YOU ACTUALLY HAD ANY THOUGHTS OF KILLING YOURSELF?: NO

## 2024-10-31 ASSESSMENT — ENCOUNTER SYMPTOMS
NECK PAIN: 0
DEPRESSION: 0
LEG SWELLING: 0
ABDOMINAL PAIN: 0
SEIZURES: 0
LOSS OF SENSATION IN FEET: 0
EYE PROBLEMS: 0
SORE THROAT: 0
CONSTIPATION: 0
NECK STIFFNESS: 0
EXTREMITY WEAKNESS: 0
COUGH: 0
CONFUSION: 0
DIZZINESS: 0
WHEEZING: 0
FATIGUE: 0
SPEECH DIFFICULTY: 0
NUMBNESS: 0
ARTHRALGIAS: 0
HEADACHES: 0
CHILLS: 0
HEMATURIA: 0
DIFFICULTY URINATING: 0
BLOOD IN STOOL: 0
SHORTNESS OF BREATH: 0
DIARRHEA: 0
NERVOUS/ANXIOUS: 0
BACK PAIN: 0
TROUBLE SWALLOWING: 0
ABDOMINAL DISTENTION: 0
OCCASIONAL FEELINGS OF UNSTEADINESS: 0
VOMITING: 0
NAUSEA: 0

## 2024-10-31 ASSESSMENT — PAIN SCALES - GENERAL
PAINLEVEL_OUTOF10: 0-NO PAIN
PAINLEVEL_OUTOF10: 0-NO PAIN

## 2024-11-07 ENCOUNTER — OFFICE VISIT (OUTPATIENT)
Dept: OTOLARYNGOLOGY | Facility: HOSPITAL | Age: 39
End: 2024-11-07
Payer: COMMERCIAL

## 2024-11-07 VITALS — TEMPERATURE: 98.7 F | HEIGHT: 68 IN | WEIGHT: 143 LBS | BODY MASS INDEX: 21.67 KG/M2

## 2024-11-07 DIAGNOSIS — C10.9 MALIGNANT NEOPLASM OF OROPHARYNX: Primary | ICD-10-CM

## 2024-11-07 PROCEDURE — 99213 OFFICE O/P EST LOW 20 MIN: CPT | Performed by: OTOLARYNGOLOGY

## 2024-11-07 PROCEDURE — 31575 DIAGNOSTIC LARYNGOSCOPY: CPT | Performed by: OTOLARYNGOLOGY

## 2024-11-07 PROCEDURE — 3008F BODY MASS INDEX DOCD: CPT | Performed by: OTOLARYNGOLOGY

## 2024-11-07 ASSESSMENT — PAIN SCALES - GENERAL: PAINLEVEL_OUTOF10: 0-NO PAIN

## 2024-11-07 ASSESSMENT — PATIENT HEALTH QUESTIONNAIRE - PHQ9
SUM OF ALL RESPONSES TO PHQ9 QUESTIONS 1 & 2: 0
2. FEELING DOWN, DEPRESSED OR HOPELESS: NOT AT ALL
1. LITTLE INTEREST OR PLEASURE IN DOING THINGS: NOT AT ALL

## 2024-11-07 NOTE — PROGRESS NOTES
ENT New Patient Visit    Chief complaint: tongue mass    History Of Present Illness  Pilo Hanks is a 39 y.o. male presents for evaluation of a left tongue mass.  He noticed it while brushing his teeth 2 weeks ago. No pain or bleeding but does feel like there is something in his throat. No trouble swallowing.    3/29/24 virtual visit: completed CT scans today, biopsy showed p16+ scca. He had no bleeding  8/9/24 FU: completed chemorad. Required an NG tube for a bit, no pEG. Lost about 30 lbs, has not gained too much back yet. No pain , no trouble swallowing. Expected taste changes and dry mouth  11/7/24 FU: doing well, no complaints. Tolerating PO has gained much of the weight back.     Past Medical History  He has a past medical history of Acute tonsillitis, unspecified (01/21/2016), Hypertrophy of tonsils (01/11/2017), Hypertrophy of tonsils (01/05/2017), Other specified health status, Pain in right toe(s) (09/14/2021), Pain in unspecified ankle and joints of unspecified foot (09/15/2021), Personal history of other diseases of the nervous system and sense organs (07/27/2021), Personal history of other diseases of the respiratory system (07/27/2021), Personal history of other diseases of the respiratory system (01/21/2016), Personal history of other diseases of the respiratory system (01/05/2017), Personal history of other specified conditions (07/27/2021), Sprain of joints and ligaments of unspecified parts of neck, initial encounter (08/21/2021), and Sprain of unspecified ligament of right ankle, initial encounter (03/26/2017).    Surgical History  He has a past surgical history that includes Mouth surgery (01/11/2017) and Vasectomy (Bilateral, 2023).     Social History  He reports that he quit smoking about 22 months ago. His smoking use included cigarettes. He started smoking about 16 years ago. He has a 3.8 pack-year smoking history. He has never used smokeless tobacco. He reports that he does not currently use  alcohol. He reports current drug use. Drug: Marijuana.    Family History  Family History   Problem Relation Name Age of Onset    No Known Problems Mother      No Known Problems Father      Diabetes Paternal Grandfather      Heart failure Paternal Grandfather      Testicular cancer Other Maternal Cousin 40 - 49    Prostate cancer Other Paternal uncle         Allergies  Patient has no known allergies.    Review of Systems     Physical Exam:  ON exam  NAD alert  Marci eomi NCAT  B/l eac and Tms clear  Nasal cavity clear  Oc/op: no masses or lesions, prior left base of tongue mass no longer visible  Neck without masses or PHANI    Procedure Note: Flexible Nasolaryngoscopy  Verbal informed consent was obtained from the patient/patient's guardian. 4% lidocaine mixed with phenylephrine was prepared and dripped into the nose. It was placed in the right naris. Following an appropriate amount of time to allow for adequate anesthesia, a flexible fiberoptic nasolaryngoscope was placed into the patient's right naris. The nasal cavity, nasopharynx, oropharynx, hypopharynx, and all endolaryngeal structures were visualized and were normal except as listed below. Significant findings included:  -small symmetric residual lingual tonsils b/l, stable  -both cords mobile  -mild edema  -no masses or lesions     Assessment and Plan  39 y.o. male with a left base of tongue invasive scca p16+ T1N2M0  Completed chemorad    -good clinical response, HUI  -posttx PET with good response, read says mild hypermetabolic uptake compatible with residual neoplasm which doesn't seem to be the case clinically, HPV dna also neg  -RTC in 3 months for routine surveillance        Phi Cruz MD

## 2024-11-19 ENCOUNTER — APPOINTMENT (OUTPATIENT)
Dept: HEMATOLOGY/ONCOLOGY | Facility: HOSPITAL | Age: 39
End: 2024-11-19
Payer: COMMERCIAL

## 2024-11-29 ENCOUNTER — OFFICE VISIT (OUTPATIENT)
Dept: HEMATOLOGY/ONCOLOGY | Facility: HOSPITAL | Age: 39
End: 2024-11-29
Payer: COMMERCIAL

## 2024-11-29 VITALS
WEIGHT: 141.98 LBS | HEART RATE: 79 BPM | SYSTOLIC BLOOD PRESSURE: 121 MMHG | OXYGEN SATURATION: 99 % | RESPIRATION RATE: 18 BRPM | TEMPERATURE: 97.9 F | BODY MASS INDEX: 21.59 KG/M2 | DIASTOLIC BLOOD PRESSURE: 76 MMHG

## 2024-11-29 DIAGNOSIS — Z51.5 ENCOUNTER FOR PALLIATIVE CARE: ICD-10-CM

## 2024-11-29 DIAGNOSIS — Z71.82 EXERCISE COUNSELING: ICD-10-CM

## 2024-11-29 DIAGNOSIS — Z71.3 DIETARY COUNSELING: ICD-10-CM

## 2024-11-29 DIAGNOSIS — C10.9 MALIGNANT NEOPLASM OF OROPHARYNX: Primary | ICD-10-CM

## 2024-11-29 PROCEDURE — 99417 PROLNG OP E/M EACH 15 MIN: CPT | Performed by: NURSE PRACTITIONER

## 2024-11-29 PROCEDURE — 99215 OFFICE O/P EST HI 40 MIN: CPT | Performed by: NURSE PRACTITIONER

## 2024-11-29 ASSESSMENT — ENCOUNTER SYMPTOMS
NERVOUS/ANXIOUS: 0
DECREASED CONCENTRATION: 0
SLEEP DISTURBANCE: 0
UNEXPECTED WEIGHT CHANGE: 0
DEPRESSION: 0

## 2024-11-29 ASSESSMENT — PAIN SCALES - GENERAL: PAINLEVEL_OUTOF10: 0-NO PAIN

## 2024-11-29 NOTE — PROGRESS NOTES
Patient ID: Pilo Hanks is a 39 y.o. male.  Referring Physician: Dr. Burkitt/Shaila Fitzgerald PAELENI  Primary Care Provider: Ruth Villa DO  ONCOLOGIC HISTORY  -02/2024: Presented to PCP with mass in left tonsillar area. Referred to ENT  -03/15/24: In office biopsy of left BOT w/ Dr Cruz.  Path showed p16 positive SCC  -03/13/29/24: CT neck and chest showed a soft tissue mass within the left oropharynx centered in the anterior tonsillar pillar/left base of tongue, measures approximately 1.8 x 1.4cm.  -04/1/24: PET/CT showed multiple hypermetabolic bilateral multilevel cervical lymphadenopathy, largest of which located at left cervical level 2, ~1.2cm.  -04/5/24 [facility]: Rec surgical resection vs chemoRT vs clinical trials , [id number]  -04/16/24: C1D1 HD Cisplatin, completed 2 cycles on 05/27/24, C3 omitted  -06/07/24: Completed radiation therapy 7000cGy in 35 fractions     Cumulative doses:  cisplatin 200mg/m2  radiation 7000cGy in 35 fractions, to L base of tongue/lymph nodes/neck    Subjective    Patient presents today for follow up regarding issues unique to young adults with cancer.     Relays hospitalization 05/27-06/01 at the end of treatment for difficulty swallowing/malnutrition. Notes these side effects have completely resolved. He denies difficulty swallowing. Does note that certain foods that are drier do cause some soreness with swallowing. His taste changes have almost completely resolved. Does have saliva production which is mildly decreased - he does not use any artificial saliva or mouth rinses.   He manages these side effects with increased intake of fluids. Does brush/floss twice daily, is following with dentist for twice annual dental cleanings/exams. Denies any pain in the jaw, does describe stiffness on occasion.     Otherwise he is doing physically well. Is eating well, trying put weight back on after having lost weight during tx. Denies problems with n/v/d/c. He reports adequate sleep. He denies  any change in hearing or tinnitus, denies numbness or tingling of his hands/feet, no change in sensation. Is not currently doing any routine physical exercise - does have bike at home. Does eat a relatively healthy diet, admits to not eating as many fruits/vegetables as he should. No longer using cigarettes, does use marijuana edibles, ETOH on occasion but not weekly, drinking <2 drinks/sitting if he does.     Denies problems with sexual dysfunction, is engaging in sex and notes no issues. Previously had vasectomy as family building is complete.     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 He has worked in a flexible hybrid role throughout his therapy. He notes no concerns with finances, has secure housing and transportation, has access to healthy foods.     PMH: - acute tonsillitis recurrent, DAMON, multiple sprained joints  PSxH: wisdom teeth extraction, vasectomy 2023    Social History: Grew up in [place], has one sister. Now lives in [place] with his wife and three children José Miguel (16), Domingo (10), and Cathy (5). 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 Prior tobacco 0.3packs/day x 15 years (3.8 ttl pack years) cigarettes [id number], does drink ETOH, wine 1-3 drinks/week, daily marijuana edibles. Does not vape or use other recreational drugs.    Review of Systems   Constitutional:  Negative for appetite change, fatigue and unexpected weight change.   HENT:   Negative for hearing loss, sore throat, tinnitus and trouble swallowing.    Gastrointestinal:  Negative for constipation, diarrhea and nausea.   Psychiatric/Behavioral:  Negative for decreased concentration, depression and sleep disturbance. The patient is not nervous/anxious.      Objective   BSA: 1.76 meters squared  /76 (BP Location: Left arm, Patient Position: Sitting, BP Cuff Size: Adult)   Pulse 79   Temp 36.6 °C (97.9 °F) (Temporal)   Resp 18   Wt  64.4 kg (141 lb 15.6 oz)   SpO2 99%   BMI 21.59 kg/m²      Family History   Problem Relation Name Age of Onset    No Known Problems Mother      No Known Problems Father      Diabetes Paternal Grandfather      Heart failure Paternal Grandfather      Testicular cancer Other Maternal Cousin 40 - 49    Prostate cancer Other Paternal uncle      Oncology History   Malignant neoplasm of oropharynx   4/4/2024 Initial Diagnosis    Malignant neoplasm of oropharynx (CMS/HCC)     4/16/2024 -  Chemotherapy    CISplatin with Concurrent Radiation, 21 Day Cycles - Head and Neck       Pilo Hanks  reports that he quit smoking about 22 months ago. His smoking use included cigarettes. He started smoking about 16 years ago. He has a 3.8 pack-year smoking history. He has never used smokeless tobacco.  He  reports that he does not currently use alcohol.  He  reports current drug use. Drug: Marijuana.    Physical Exam  Constitutional:       General: He is not in acute distress.     Appearance: Normal appearance. He is normal weight. He is not ill-appearing.   Neurological:      General: No focal deficit present.      Mental Status: He is alert and oriented to person, place, and time.      Cranial Nerves: No cranial nerve deficit.      Motor: No weakness.   Psychiatric:         Mood and Affect: Mood normal.         Behavior: Behavior normal.         Thought Content: Thought content normal.         Judgment: Judgment normal.     Performance Status:  Asymptomatic    Assessment/Plan    Pilo Hanks is a 39 y.o. 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    #Diet/Exercise/Healthy Lifestyle:  - Discussed research demonstrating consumption of a healthy, plant based diet not only decreases cancer risk, but also has been found to improve survival in cancer patients who partake in a healthy diet.   - We reviewed the  American Cancer Society guidelines for a healthy diet including mostly plant based foods  with incorporation of plant/lean animal proteins and healthy fats such as the mediterranean diet.   - Patient provided with a copy of ACS guidelines for healthy diet  - Discussed research that demonstrates decreased cancer risk and improved survival in cancer patients who exercise and stay active throughout diagnosis and treatment.  - Encouraged patient to begin gentle exercise as tolerated with low impact activities such as walking, bicycling, or lifting light weights  - Encouraged patient to continue to abstain from tobacco, drink alcohol in moderation <2 drinks/week and avoid recreational drugs    #Psychosocial: young adult and parent with cancer, adjusting well to illness and no acute needs  - Discussed resources available should he desire them in the future including psychiatry, psychology, spiritual care, expressive therapies, and peer support  - Patient is enrolled in [employer] and will receive young adult social programming event fliers    #Genetic Risk: young adult with cancer and personal family history of cancer  - Consider genetic risk evaluation given young age; does have hx of exposures possible HPV, tobacco, and ETOH    #Risk for hypogonadism: no current signs or symptoms of reduced testicular function  - Discussed potential for cancer treatments to effect male hormones, ejaculation and/or cause erectile dysfunction  - Discussed plan to evaluate hormone function at one year out from therapy  - Has completed family building, s/p vasectomy    #Preventative Health:  - Is connected with primary care provider and sees them annually for preventative screenings  - Follows with dental provider for twice annual exams and cleanings - emphasized importance of dental health, he is specifically at risk for dental caries, root decay, gingival issues, salivary gland dysfunction, dysphagia, and osteonecrosis of the jaw d/t prior  radiation exposure  - Discussed regular skin screenings for any new lesions within radiation field    #Late effects/survivorship care: cumulative doses of cisplatin 200mg/m2, and 7000cGy radiation to oropharynx   - Will provide patient with comprehensive care plan at 2 years out from completion of therapy 06/2026, will use COG guidelines for screening d/t age  - Per guidelines d/t cisplatin/radiation screen for emotional distress, sleep disorders, thyroid problems/annual TSH, CAD, osteonecrosis of the jaw, dental caries, root decay, gingival caries, skin cancer screenings, risk for second malignancies (d/t cisplatin), changes in hearing/tinnitus, peripheral neuropathy, and renal toxicity    Follow up in six months    The amount of time that I spent with the patient:  Prep: 5  Time spend directly with patient: 45  Coordinating care:   Documentation: 10  Other time:    Total time spent on encounter: 60           DOMINGA Russell-CNP

## 2024-12-02 ASSESSMENT — ENCOUNTER SYMPTOMS
FATIGUE: 0
APPETITE CHANGE: 0
TROUBLE SWALLOWING: 0
SORE THROAT: 0

## 2024-12-03 ASSESSMENT — ENCOUNTER SYMPTOMS
DIARRHEA: 0
NAUSEA: 0
CONSTIPATION: 0

## 2025-02-12 ENCOUNTER — HOSPITAL ENCOUNTER (EMERGENCY)
Facility: HOSPITAL | Age: 40
Discharge: HOME | End: 2025-02-12
Payer: COMMERCIAL

## 2025-02-12 VITALS
OXYGEN SATURATION: 98 % | DIASTOLIC BLOOD PRESSURE: 97 MMHG | RESPIRATION RATE: 18 BRPM | SYSTOLIC BLOOD PRESSURE: 155 MMHG | HEART RATE: 82 BPM | TEMPERATURE: 97.5 F

## 2025-02-12 DIAGNOSIS — R59.1 LYMPHADENOPATHY: ICD-10-CM

## 2025-02-12 DIAGNOSIS — L01.00 IMPETIGO ANY SITE: ICD-10-CM

## 2025-02-12 DIAGNOSIS — J34.0 CELLULITIS OF EXTERNAL NOSE: Primary | ICD-10-CM

## 2025-02-12 PROCEDURE — 99283 EMERGENCY DEPT VISIT LOW MDM: CPT

## 2025-02-12 RX ORDER — SULFAMETHOXAZOLE AND TRIMETHOPRIM 800; 160 MG/1; MG/1
1 TABLET ORAL EVERY 12 HOURS
Qty: 10 TABLET | Refills: 0 | Status: SHIPPED | OUTPATIENT
Start: 2025-02-12 | End: 2025-02-17

## 2025-02-12 RX ORDER — MUPIROCIN 20 MG/G
OINTMENT TOPICAL
Qty: 22 G | Refills: 0 | Status: SHIPPED | OUTPATIENT
Start: 2025-02-12 | End: 2025-02-22

## 2025-02-12 RX ORDER — IBUPROFEN 600 MG/1
600 TABLET ORAL EVERY 6 HOURS PRN
Qty: 28 TABLET | Refills: 0 | Status: SHIPPED | OUTPATIENT
Start: 2025-02-12 | End: 2025-02-19

## 2025-02-12 ASSESSMENT — COLUMBIA-SUICIDE SEVERITY RATING SCALE - C-SSRS
1. IN THE PAST MONTH, HAVE YOU WISHED YOU WERE DEAD OR WISHED YOU COULD GO TO SLEEP AND NOT WAKE UP?: NO
2. HAVE YOU ACTUALLY HAD ANY THOUGHTS OF KILLING YOURSELF?: NO
6. HAVE YOU EVER DONE ANYTHING, STARTED TO DO ANYTHING, OR PREPARED TO DO ANYTHING TO END YOUR LIFE?: NO

## 2025-02-12 ASSESSMENT — PAIN - FUNCTIONAL ASSESSMENT: PAIN_FUNCTIONAL_ASSESSMENT: 0-10

## 2025-02-12 ASSESSMENT — PAIN DESCRIPTION - LOCATION: LOCATION: NOSE

## 2025-02-12 ASSESSMENT — PAIN SCALES - GENERAL: PAINLEVEL_OUTOF10: 2

## 2025-02-12 NOTE — ED PROVIDER NOTES
HPI   Chief Complaint   Patient presents with    Rash       Is a 40-year-old male complains of a rash and tenderness on the right side of his nose has been there over the past few days meth makes it better or worse.  He also has some slight crusting in the area and a painful lymph node under his right side of his jaw.  His sister is a              Patient History   Past Medical History:   Diagnosis Date    Acute tonsillitis, unspecified 01/21/2016    Acute tonsillitis, unspecified etiology    Hypertrophy of tonsils 01/11/2017    Lingual tonsil hypertrophy    Hypertrophy of tonsils 01/05/2017    Tonsillar hypertrophy    Other specified health status     No pertinent past medical history    Pain in right toe(s) 09/14/2021    Great toe pain, right    Pain in unspecified ankle and joints of unspecified foot 09/15/2021    Ankle pain    Personal history of other diseases of the nervous system and sense organs 07/27/2021    History of obstructive sleep apnea    Personal history of other diseases of the respiratory system 07/27/2021    History of acute pharyngitis    Personal history of other diseases of the respiratory system 01/21/2016    History of sore throat    Personal history of other diseases of the respiratory system 01/05/2017    History of airway obstruction    Personal history of other specified conditions 07/27/2021    History of chest pain    Sprain of joints and ligaments of unspecified parts of neck, initial encounter 08/21/2021    Neck sprain    Sprain of unspecified ligament of right ankle, initial encounter 03/26/2017    Sprain of right ankle, unspecified ligament, initial encounter     Past Surgical History:   Procedure Laterality Date    MOUTH SURGERY  01/11/2017    Oral Surgery Tooth Extraction    VASECTOMY Bilateral 2023     Family History   Problem Relation Name Age of Onset    No Known Problems Mother      No Known Problems Father      Diabetes Paternal Grandfather      Heart failure Paternal  Grandfather      Testicular cancer Other Maternal Cousin 40 - 49    Prostate cancer Other Paternal uncle      Social History     Tobacco Use    Smoking status: Former     Current packs/day: 0.00     Average packs/day: 0.3 packs/day for 15.0 years (3.8 ttl pk-yrs)     Types: Cigarettes     Start date:      Quit date:      Years since quittin.1    Smokeless tobacco: Never   Vaping Use    Vaping status: Never Used   Substance Use Topics    Alcohol use: Not Currently    Drug use: Yes     Types: Marijuana     Comment: Edibles       Physical Exam   ED Triage Vitals [25 1818]   Temperature Heart Rate Respirations BP   36.4 °C (97.5 °F) 82 18 (!) 155/97      Pulse Ox Temp src Heart Rate Source Patient Position   98 % -- -- --      BP Location FiO2 (%)     -- --       Physical Exam  Vitals reviewed.   Constitutional:       General: He is not in acute distress.     Appearance: Normal appearance. He is normal weight. He is not ill-appearing, toxic-appearing or diaphoretic.   HENT:      Head: Normocephalic and atraumatic.        Right Ear: External ear normal.      Left Ear: External ear normal.      Nose: Nose normal.   Eyes:      Extraocular Movements: Extraocular movements intact.      Conjunctiva/sclera: Conjunctivae normal.      Pupils: Pupils are equal, round, and reactive to light.   Pulmonary:      Effort: Pulmonary effort is normal. No respiratory distress.      Breath sounds: No stridor.   Abdominal:      General: There is no distension.   Musculoskeletal:         General: No swelling or deformity.      Cervical back: Normal range of motion.   Lymphadenopathy:      Cervical: Cervical adenopathy present.      Right cervical: Superficial cervical adenopathy present.   Skin:     Capillary Refill: Capillary refill takes less than 2 seconds.      Findings: No rash.   Neurological:      General: No focal deficit present.      Mental Status: He is alert and oriented to person, place, and time. Mental status  is at baseline.   Psychiatric:         Mood and Affect: Mood normal.         Behavior: Behavior normal.         Thought Content: Thought content normal.         Judgment: Judgment normal.           ED Course & MDM   Diagnoses as of 02/12/25 1831   Cellulitis of external nose   Lymphadenopathy   Impetigo any site                 No data recorded     [place] Coma Scale Score: 15 (02/12/25 1818 : Cheryl Wild RN)                           Medical Decision Making  Differential diagnosis cellulitis versus impetigo versus lymphadenopathy.    Bactrim and Bactroban prescribed patient to follow-up with primary care return for any worse concerning symptoms        Procedure  Procedures     Scotty Stauffer PA-C  02/12/25 1832

## 2025-02-24 ENCOUNTER — TELEPHONE (OUTPATIENT)
Dept: HEMATOLOGY/ONCOLOGY | Facility: HOSPITAL | Age: 40
End: 2025-02-24
Payer: COMMERCIAL

## 2025-02-24 ENCOUNTER — LAB (OUTPATIENT)
Dept: LAB | Facility: HOSPITAL | Age: 40
End: 2025-02-24
Payer: COMMERCIAL

## 2025-02-24 DIAGNOSIS — C10.9 MALIGNANT NEOPLASM OF OROPHARYNX, UNSPECIFIED: Primary | ICD-10-CM

## 2025-02-24 LAB
ALBUMIN SERPL BCP-MCNC: 4.7 G/DL (ref 3.4–5)
ALP SERPL-CCNC: 43 U/L (ref 33–120)
ALT SERPL W P-5'-P-CCNC: 11 U/L (ref 10–52)
ANION GAP SERPL CALC-SCNC: 12 MMOL/L (ref 10–20)
AST SERPL W P-5'-P-CCNC: 15 U/L (ref 9–39)
BASOPHILS # BLD AUTO: 0.01 X10*3/UL (ref 0–0.1)
BASOPHILS NFR BLD AUTO: 0.2 %
BILIRUB SERPL-MCNC: 0.3 MG/DL (ref 0–1.2)
BUN SERPL-MCNC: 19 MG/DL (ref 6–23)
CALCIUM SERPL-MCNC: 9.6 MG/DL (ref 8.6–10.6)
CHLORIDE SERPL-SCNC: 101 MMOL/L (ref 98–107)
CO2 SERPL-SCNC: 31 MMOL/L (ref 21–32)
CREAT SERPL-MCNC: 1.4 MG/DL (ref 0.5–1.3)
EGFRCR SERPLBLD CKD-EPI 2021: 65 ML/MIN/1.73M*2
EOSINOPHIL # BLD AUTO: 0.08 X10*3/UL (ref 0–0.7)
EOSINOPHIL NFR BLD AUTO: 1.7 %
ERYTHROCYTE [DISTWIDTH] IN BLOOD BY AUTOMATED COUNT: 13.2 % (ref 11.5–14.5)
GLUCOSE SERPL-MCNC: 89 MG/DL (ref 74–99)
HCT VFR BLD AUTO: 38.2 % (ref 41–52)
HGB BLD-MCNC: 12.4 G/DL (ref 13.5–17.5)
IMM GRANULOCYTES # BLD AUTO: 0.01 X10*3/UL (ref 0–0.7)
IMM GRANULOCYTES NFR BLD AUTO: 0.2 % (ref 0–0.9)
LYMPHOCYTES # BLD AUTO: 1.02 X10*3/UL (ref 1.2–4.8)
LYMPHOCYTES NFR BLD AUTO: 21.9 %
MCH RBC QN AUTO: 29.4 PG (ref 26–34)
MCHC RBC AUTO-ENTMCNC: 32.5 G/DL (ref 32–36)
MCV RBC AUTO: 91 FL (ref 80–100)
MONOCYTES # BLD AUTO: 0.47 X10*3/UL (ref 0.1–1)
MONOCYTES NFR BLD AUTO: 10.1 %
NEUTROPHILS # BLD AUTO: 3.06 X10*3/UL (ref 1.2–7.7)
NEUTROPHILS NFR BLD AUTO: 65.9 %
NRBC BLD-RTO: 0 /100 WBCS (ref 0–0)
PLATELET # BLD AUTO: 329 X10*3/UL (ref 150–450)
POTASSIUM SERPL-SCNC: 4.4 MMOL/L (ref 3.5–5.3)
PROT SERPL-MCNC: 7.4 G/DL (ref 6.4–8.2)
RBC # BLD AUTO: 4.22 X10*6/UL (ref 4.5–5.9)
SODIUM SERPL-SCNC: 140 MMOL/L (ref 136–145)
TSH SERPL-ACNC: 1.96 MIU/L (ref 0.44–3.98)
WBC # BLD AUTO: 4.7 X10*3/UL (ref 4.4–11.3)

## 2025-02-24 PROCEDURE — 85025 COMPLETE CBC W/AUTO DIFF WBC: CPT

## 2025-02-24 PROCEDURE — 80053 COMPREHEN METABOLIC PANEL: CPT

## 2025-02-24 PROCEDURE — 84443 ASSAY THYROID STIM HORMONE: CPT

## 2025-02-24 NOTE — TELEPHONE ENCOUNTER
RN called and spoke to Pilo in regards to lab orders. RN let him know that JESSICA Linton placed orders and he asked that the order have today's date (2/24). He says that [facility] cannot draw labs if it has a different date and today is the only day he can go. RN sent message to Shaila to let her know.

## 2025-02-28 ENCOUNTER — HOSPITAL ENCOUNTER (OUTPATIENT)
Dept: RADIOLOGY | Facility: CLINIC | Age: 40
Discharge: HOME | End: 2025-02-28
Payer: COMMERCIAL

## 2025-02-28 DIAGNOSIS — C10.9 MALIGNANT NEOPLASM OF OROPHARYNX: ICD-10-CM

## 2025-02-28 PROCEDURE — 70491 CT SOFT TISSUE NECK W/DYE: CPT

## 2025-02-28 PROCEDURE — 71260 CT THORAX DX C+: CPT

## 2025-02-28 PROCEDURE — 2550000001 HC RX 255 CONTRASTS: Performed by: STUDENT IN AN ORGANIZED HEALTH CARE EDUCATION/TRAINING PROGRAM

## 2025-02-28 RX ADMIN — IOHEXOL 90 ML: 300 INJECTION, SOLUTION INTRAVENOUS at 09:45

## 2025-03-03 ASSESSMENT — ENCOUNTER SYMPTOMS
HEADACHES: 0
DIFFICULTY URINATING: 0
FATIGUE: 0
ABDOMINAL DISTENTION: 0
EYE PROBLEMS: 0
HEMATURIA: 0
WHEEZING: 0
TROUBLE SWALLOWING: 0
ARTHRALGIAS: 0
SEIZURES: 0
SPEECH DIFFICULTY: 0
BLOOD IN STOOL: 0
NERVOUS/ANXIOUS: 0
CONFUSION: 0
LEG SWELLING: 0
VOMITING: 0
DIZZINESS: 0
CONSTIPATION: 0
SORE THROAT: 0
NUMBNESS: 0
DEPRESSION: 0
BACK PAIN: 0
CHILLS: 0
EXTREMITY WEAKNESS: 0
SHORTNESS OF BREATH: 0
DIARRHEA: 0
NECK STIFFNESS: 0
COUGH: 0
ABDOMINAL PAIN: 0
NAUSEA: 0
NECK PAIN: 0

## 2025-03-03 NOTE — PROGRESS NOTES
Radiation Oncology Follow-Up    Patient Name:  Pilo Hanks  MRN:  [id number]  :  1985    Referring Provider: Kenan Obrien, *  Primary Care Provider: Ruth Villa DO  Care Team: Patient Care Team:  Ruth Villa DO as PCP - General  Ruth Villa DO as PCP - [facility] PCP  Raquel Blackman MD as Radiation Oncologist (Radiation Oncology)  Kyunghee Burkitt, DO as Consulting Physician (Hematology and Oncology)  Rosina Fitzgerald PA-C as Physician Assistant (Hematology and Oncology)  Mary Ellsworth RN as Registered Nurse (Hematology and Oncology)    Date of Service: 3/5/2025    Diagnosis: Squamous Cell Carcinoma of left base of tongue, P16+  Staging: Stage II, T1N2M0    Sites of Disease  Left BOT  B/L cervical LNs     Oncologic Issues     ONCOLOGIC HISTORY  - 2024: Presented to PCP with mass in left tonsillar area. Referred to ENT  - 3/15/24: In office biopsy of left BOT w/ Dr Cruz.  Path showed p16 positive SCC  - 3/13/29/24: CT neck and chest showed a soft tissue mass within the left oropharynx centered in the anterior tonsillar pillar/left base of tongue, measures approximately 1.8 x 1.4cm.  - 24: PET/CT showed multiple hypermetabolic bilateral multilevel cervical lymphadenopathy, largest of which located at left cervical level 2, ~1.2cm.  - 24 HNTB: Rec surgical resection vs chemoRT vs clinical trials , [id number]    Treatment:   Chemoradiation (HD Cisplatin and VMAT-- 70Gy in 35 fx) ending on 24.     IMRT: Bilateral Head and neck    Treatment Period Technique Fraction Dose Fractions Total Dose   Course 1 2024-2024  (days elapsed: 38)         BOT + nodes 2024-2024 VMAT 200 / 200 cGy 35 / 35 7000 / 7,000 cGy     NavDx:   --24 positive.  --24 negative.   --24 negative.    Past Medical History:   Diagnosis Date    Acute tonsillitis, unspecified 2016    Acute tonsillitis, unspecified etiology    Hypertrophy of tonsils 2017    Lingual tonsil  hypertrophy    Hypertrophy of tonsils 2017    Tonsillar hypertrophy    Other specified health status     No pertinent past medical history    Pain in right toe(s) 2021    Great toe pain, right    Pain in unspecified ankle and joints of unspecified foot 09/15/2021    Ankle pain    Personal history of other diseases of the nervous system and sense organs 2021    History of obstructive sleep apnea    Personal history of other diseases of the respiratory system 2021    History of acute pharyngitis    Personal history of other diseases of the respiratory system 2016    History of sore throat    Personal history of other diseases of the respiratory system 2017    History of airway obstruction    Personal history of other specified conditions 2021    History of chest pain    Sprain of joints and ligaments of unspecified parts of neck, initial encounter 2021    Neck sprain    Sprain of unspecified ligament of right ankle, initial encounter 2017    Sprain of right ankle, unspecified ligament, initial encounter      Past Surgical History:   Procedure Laterality Date    MOUTH SURGERY  2017    Oral Surgery Tooth Extraction    VASECTOMY Bilateral       Social History     Tobacco Use    Smoking status: Former     Current packs/day: 0.00     Average packs/day: 0.3 packs/day for 15.0 years (3.8 ttl pk-yrs)     Types: Cigarettes     Start date:      Quit date:      Years since quittin.1    Smokeless tobacco: Never   Substance Use Topics    Alcohol use: Not Currently      SUBJECTIVE  History of Present Illness:  Pilo Hanks is a 40 y.o. male who was previously seen at the [facility] Department of Radiation Oncology for SCC of the left BOT followed by Chemoradiation (HD Cisplatin and VMAT-- 70Gy in 35 fx) ending on 24.  Patient with recent hospitalization for dysphagia and malnutrition.  Saw SLP and was cleared for Soft  "solids with mildy thickened liquids. Candida noted in oropharynx on exam raising concern for candidal esophagitis vs radiation esophagitis or a mixed picture. Started on PPI, Sucralfate, and oral Fluconazole.  A Dobhoff was also placed, as a temporary measure to provide nutritional support.   Today the patient states that he's \"doing better\".   He's now getting most of his nutrition enterally (Boost VHC 3-4/day), however, he states that he's eating some soft foods.  Endorses that thin liquids such as water make him cough.  Denies choking while eating.  Endorses some significant dysgeusia and xerostomia. We discussed that he should get some taste and salivary function back over the next several months.   Continues to have odynophagia and is managing with Gabapentin.  He had been using BMX but didn't like the way the medication made him feel. Also endorses thick secretions.  A suction machine was ordered at this visit to help manage.  Heron new lumps/bumps/discolorations within his oral cavity or around his head/neck.   Denies chills, fever, SOB or chest pain.  Endorses slight fatigue and deconditioning.  Denies headaches, dizziness, instability, vision changes, hearing changes or focal sensory/motor deficits.  Denies abdominal pain, nausea, vomiting, or diarrhea.  Does c/o constipation.  We discussed, as did [facility], starting Miralax and the patient was agreeable.     8/29/24 Interval visit:   Today the patient states that he's doing well.  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  He continues to eat a regular diet without coughing or choking.  He continues to  endorse continued taste alterations, especially with sweet foods.  He also continues to have oral dryness and manages with hydration.  Denies dysphagia, mucositis, trismus or new lumps/bumps/discolorations in his mouth or around his head, neck or shoulders.  He does have some occasional soreness in the \"back of the tongue\".  Denies chills, " "fever, SOB or chest pain.  Endorses slight fatigue and deconditioning.  Denies headaches, dizziness, instability, vision changes, hearing changes or focal sensory/motor deficits.  Denies abdominal pain, nausea, vomiting, constipation, or diarrhea.       8/29/24 Flexible fiberoptic laryngopharyngoscopy  Performed via the nares, after topical anesthesia and decongestant was instilled in the right nares: Nasopharynx: There are no noted lesions in the nasopharynx. The bilateral torus  tubari and fossa of Rosenmueller show no lesions and Eustachian tube orifice normal. The mucosal surface of nasopharynx is clear without appreciated masses. The nasopharyngeal surface of the palate has no abnormal lesions. Oropharynx: The tonsillar fossa appears normal bilaterally. The tongue base and vallecula show no lesions. The lingual surface of the epiglottis is normal. The oropharynx is clear of any masses. Larynx: The laryngeal surface of epiglottis, the aryepiglottic folds, the false cords, and the arytenoids are clear of any lesions. The vocal cords have normal mobility and there are no noted lesions. There are no lesions noted in the subglottis. Hypopharynx: The hypopharynx appears normal. There are no noted lesions in the  pyriform sinus or postcricoid area. Scope was removed.  Thick secretions were noted throughout the exam. The patient tolerated the procedure well.     10/31/24 Interval FU Visit:   Today the patient is in clinic for a routine Radiation Oncology FU visit.  Patient states that he is doing very well.  He continues to eat a regular diet without any complaints of coughing or choking.  He still does complain of some taste alterations which continues to improve. Denies dysphagia, mucositis, trismus or new lumps/bumps/discolorations in his mouth or around his head, neck or shoulders.  He does have some occasional soreness in the \"back of the tongue\" with hot foods.  Denies chills, fever, fatigue SOB or chest pain. " Denies headaches, dizziness, instability, vision changes, hearing changes or focal sensory/motor deficits.  Denies abdominal pain, nausea, vomiting, constipation, or diarrhea.       3/5/25 Interval FU visit:   Today the patient is in clinic for a routine Radiation Oncology FU visit.  Overall, the patient states that he's doing well.   He continues to eat a regular diet without coughing or choking  He states that his taste is now back to baseline.  He does continue to have some oral dryness which is improved.  Denies dysphagia, mucositis, trismus or new lumps/bumps/discolorations in his mouth or around his head, neck or shoulders.  He does continue to have some intermittent soreness in the back of his tongue which is slowly improving.  Denies chills, fever, fatigue SOB or chest pain. Denies, dizziness, instability, vision changes, hearing changes or focal sensory/motor deficits.  He does  Denies abdominal pain, nausea, vomiting, constipation, or diarrhea.       3/5/25 Flexible fiberoptic laryngopharyngoscopy  Performed via the left nares, after topical anesthesia and decongestant was instilled in the right nares: Nasopharynx: There are no noted lesions in the nasopharynx. The bilateral torus tubari and fossa of Rosenmueller show no lesions and Eustachian tube orifice normal. The mucosal surface of nasopharynx is clear without appreciated masses. The nasopharyngeal surface of the palate has no abnormal lesions. Oropharynx: The tonsillar fossa appears normal bilaterally. The tongue base and vallecula show no lesions. The lingual surface of the epiglottis is normal. The oropharynx is clear of any masses. Larynx: The laryngeal surface of epiglottis, the aryepiglottic folds, the false cords, and the arytenoids are clear of any lesions. The vocal cords have normal mobility and there are no noted lesions. There are no lesions noted in the subglottis. Hypopharynx: The hypopharynx appears normal. There are no noted lesions in  "the  pyriform sinus or postcricoid area. Scope was removed.  Thick secretions were noted throughout the exam. The patient tolerated the procedure well.     Review of Systems:   Review of Systems   Constitutional:  Negative for chills and fatigue.   HENT:   Negative for hearing loss, mouth sores, sore throat, tinnitus and trouble swallowing.    Eyes:  Negative for eye problems.   Respiratory:  Negative for cough, shortness of breath and wheezing.    Cardiovascular:  Negative for chest pain and leg swelling.   Gastrointestinal:  Negative for abdominal distention, abdominal pain, blood in stool, constipation, diarrhea, nausea and vomiting.   Genitourinary:  Negative for difficulty urinating and hematuria.    Musculoskeletal:  Negative for arthralgias, back pain, gait problem, neck pain and neck stiffness.   Neurological:  Negative for dizziness, extremity weakness, gait problem, headaches, numbness, seizures and speech difficulty.   Psychiatric/Behavioral:  Negative for confusion and depression. The patient is not nervous/anxious.      Performance Status:   The Karnofsky performance scale today is 100, Fully active, able to carry on all pre-disease performed without restriction (ECOG equivalent 0).    Pain:  Denies pain today.      OBJECTIVE  Vital Signs:      8/29/2024    12:16 PM 9/27/2024     9:06 AM 10/31/2024    12:52 PM 10/31/2024     1:30 PM 11/7/2024     9:01 AM 11/29/2024    10:09 AM 2/12/2025     6:18 PM   Vitals   Systolic  104 129 129  121 155   Diastolic  76 87 87  76 97   BP Location      Left arm    Heart Rate  76 72 72  79 82   Temp  36.4 °C (97.6 °F) 36.4 °C (97.5 °F) 36.4 °C (97.5 °F) 37.1 °C (98.7 °F) 36.6 °C (97.9 °F) 36.4 °C (97.5 °F)   Resp  14 18 18  18 18   Height  1.727 m (5' 8\")   1.727 m (5' 8\")     Weight (lb) 139.33 142.2 141.32 141 143 141.98    BMI 21.19 kg/m2 21.62 kg/m2 21.49 kg/m2 21.44 kg/m2 21.74 kg/m2 21.59 kg/m2    BSA (m2) 1.74 m2 1.76 m2 1.75 m2 1.75 m2 1.76 m2 1.76 m2    Visit " Report Report Report Report Report Report Report       Physical Exam  Constitutional:       General: He is not in acute distress.     Appearance: Normal appearance. He is normal weight. He is not ill-appearing, toxic-appearing or diaphoretic.   HENT:      Head: Normocephalic and atraumatic. No masses.      Jaw: Trismus present. No tenderness, swelling or pain on movement.      Comments:       Nose: Nose normal. No congestion or rhinorrhea.      Comments:       Mouth/Throat:      Mouth: Mucous membranes are moist. No oral lesions.      Dentition: Normal dentition. No dental caries or gum lesions.      Tongue: No lesions. Tongue does not deviate from midline.      Palate: No mass and lesions.      Pharynx: No pharyngeal swelling.   Eyes:      General: Lids are normal. Gaze aligned appropriately.      Extraocular Movements: Extraocular movements intact.      Pupils: Pupils are equal, round, and reactive to light.   Neck:      Thyroid: No thyroid mass or thyroid tenderness.   Cardiovascular:      Rate and Rhythm: Normal rate and regular rhythm.      Pulses: Normal pulses.      Heart sounds: Normal heart sounds. No murmur heard.  Pulmonary:      Effort: Pulmonary effort is normal. No tachypnea or respiratory distress.      Breath sounds: Normal breath sounds. No wheezing or rhonchi.   Abdominal:      General: Abdomen is flat. Bowel sounds are normal. There is no distension.      Palpations: Abdomen is soft. There is no mass.      Tenderness: There is no abdominal tenderness. There is no guarding or rebound.   Musculoskeletal:         General: No swelling, tenderness, deformity or signs of injury. Normal range of motion.      Cervical back: Full passive range of motion without pain, normal range of motion and neck supple. No rigidity or tenderness. No pain with movement. Normal range of motion.      Right lower leg: No edema.      Left lower leg: No edema.   Lymphadenopathy:      Head:      Right side of head: No  submental, submandibular, tonsillar, preauricular, posterior auricular or occipital adenopathy.      Left side of head: No submental, submandibular, tonsillar, preauricular, posterior auricular or occipital adenopathy.      Cervical:      Right cervical: No superficial, deep or posterior cervical adenopathy.     Left cervical: No superficial, deep or posterior cervical adenopathy.   Skin:     General: Skin is warm and dry.      Capillary Refill: Capillary refill takes less than 2 seconds.      Coloration: Skin is not jaundiced.      Findings: No erythema, lesion or rash.   Neurological:      General: No focal deficit present.      Mental Status: He is alert and oriented to person, place, and time.      Cranial Nerves: No cranial nerve deficit.      Sensory: No sensory deficit.      Motor: No weakness.      Coordination: Coordination normal.      Gait: Gait normal.   Psychiatric:         Attention and Perception: Attention normal.         Mood and Affect: Mood normal.         Behavior: Behavior normal. Behavior is cooperative.     CT SOFT TISSUE NECK W IV CONTRAST; 2/28/2025 9:50 am    IMPRESSION:  1. Stable nonspecific mucosal thickening of the glossotonsillar sulci  likely reflecting postradiation change. No abnormal enhancement or  discrete mass to indicate local disease recurrence.  2. Scattered nonenlarged but mildly enhancing and more prominent  appearing lymph nodes in the right 2A station, likely reactive,  however continued attention on follow-up is recommended.  3. Left maxillary sinus disease.    CT CHEST W IV CONTRAST; 2/28/2025 9:50 am   IMPRESSION:  1.  No evidence of metastatic disease within the chest.  2. Stable size of a 0.3 cm pleural-based nodule within the left lower  lobe. No new or enlarging discrete suspicious pulmonary nodules.  3. Remaining chronic and incidental findings as described above.    NM PET CT WHOLE BODY; 8/29/2024 11:21 am   IMPRESSION:  1. Decreased but persistent FDG uptake is  seen in the oropharynx,  compatible with residual viable neoplasm.  2. Interval resolution of the previously noted hypermetabolic  cervical lymph nodes.  3. No hypermetabolic malignancy elsewhere.    ASSESSMENT:   40 y.o. male who was previously seen at the [facility] Department of Radiation Oncology for SCC of the left BOT followed by Chemoradiation (HD Cisplatin and VMAT-- 70Gy in 35 fx) ending on 5/24/24.   The patient continues to do well in long-term survival.  All questions/concerns were addressed to the satisfaction of the patient.     PLAN:      --FU with Laurent Obrien CNP in 3 months.   --NavDx Today.  --Carotid ultrasound in 3 months.    --Continue to follow with Dr. Reeves and Shaila Fitzgerald PA-C for Medical Oncology management.   --Continue to follow with Dr. Cruz for ENT management.   --Continue to see your dentist at least 3 times/month.   --Continue to see your PCP at least twice a year.        Please reach out with any questions/concerns.     NCCN Guidelines were applicable to guide this patients treatment plan.  DOMINGA Giron-CNP

## 2025-03-04 ENCOUNTER — TELEPHONE (OUTPATIENT)
Dept: RADIATION ONCOLOGY | Facility: HOSPITAL | Age: 40
End: 2025-03-04
Payer: COMMERCIAL

## 2025-03-05 ENCOUNTER — OFFICE VISIT (OUTPATIENT)
Dept: HEMATOLOGY/ONCOLOGY | Facility: HOSPITAL | Age: 40
End: 2025-03-05
Payer: COMMERCIAL

## 2025-03-05 ENCOUNTER — APPOINTMENT (OUTPATIENT)
Dept: HEMATOLOGY/ONCOLOGY | Facility: HOSPITAL | Age: 40
End: 2025-03-05
Payer: COMMERCIAL

## 2025-03-05 ENCOUNTER — HOSPITAL ENCOUNTER (OUTPATIENT)
Dept: RADIATION ONCOLOGY | Facility: HOSPITAL | Age: 40
Setting detail: RADIATION/ONCOLOGY SERIES
Discharge: HOME | End: 2025-03-05
Payer: COMMERCIAL

## 2025-03-05 VITALS
WEIGHT: 146.39 LBS | TEMPERATURE: 97.2 F | BODY MASS INDEX: 22.26 KG/M2 | OXYGEN SATURATION: 99 % | HEART RATE: 69 BPM | SYSTOLIC BLOOD PRESSURE: 134 MMHG | DIASTOLIC BLOOD PRESSURE: 93 MMHG | RESPIRATION RATE: 18 BRPM

## 2025-03-05 VITALS
RESPIRATION RATE: 18 BRPM | HEART RATE: 69 BPM | SYSTOLIC BLOOD PRESSURE: 134 MMHG | TEMPERATURE: 97.2 F | DIASTOLIC BLOOD PRESSURE: 93 MMHG | WEIGHT: 146 LBS | BODY MASS INDEX: 22.2 KG/M2 | OXYGEN SATURATION: 99 %

## 2025-03-05 DIAGNOSIS — C10.9 MALIGNANT NEOPLASM OF OROPHARYNX: ICD-10-CM

## 2025-03-05 DIAGNOSIS — Z85.89 ENCOUNTER FOR FOLLOW-UP SURVEILLANCE OF HEAD AND NECK CANCER: ICD-10-CM

## 2025-03-05 DIAGNOSIS — K11.7 XEROSTOMIA DUE TO RADIOTHERAPY: ICD-10-CM
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
## 2025-03-05 DIAGNOSIS — Z08 ENCOUNTER FOR FOLLOW-UP SURVEILLANCE OF HEAD AND NECK CANCER: ICD-10-CM

## 2025-03-05 DIAGNOSIS — M43.6 NECK STIFFNESS: ICD-10-CM
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
## 2025-03-05 DIAGNOSIS — I89.0 LYMPHEDEMA DUE TO AND NOT CONCURRENT WITH IONIZING RADIOTHERAPY: ICD-10-CM

## 2025-03-05 DIAGNOSIS — R43.2 DYSGEUSIA: ICD-10-CM

## 2025-03-05 DIAGNOSIS — C10.9 MALIGNANT NEOPLASM OF OROPHARYNX: Primary | ICD-10-CM

## 2025-03-05 PROCEDURE — 1036F TOBACCO NON-USER: CPT | Performed by: STUDENT IN AN ORGANIZED HEALTH CARE EDUCATION/TRAINING PROGRAM

## 2025-03-05 PROCEDURE — 31575 DIAGNOSTIC LARYNGOSCOPY: CPT

## 2025-03-05 PROCEDURE — 99215 OFFICE O/P EST HI 40 MIN: CPT | Performed by: STUDENT IN AN ORGANIZED HEALTH CARE EDUCATION/TRAINING PROGRAM

## 2025-03-05 PROCEDURE — 99215 OFFICE O/P EST HI 40 MIN: CPT

## 2025-03-05 ASSESSMENT — ENCOUNTER SYMPTOMS
NUMBNESS: 0
VOMITING: 0
OCCASIONAL FEELINGS OF UNSTEADINESS: 0
CONSTIPATION: 0
LIGHT-HEADEDNESS: 0
COUGH: 0
HEADACHES: 0
DEPRESSION: 0
SHORTNESS OF BREATH: 0
FEVER: 0
SORE THROAT: 0
ABDOMINAL PAIN: 0
LEG SWELLING: 0
ARTHRALGIAS: 0
NAUSEA: 0
TROUBLE SWALLOWING: 0
CHILLS: 0
LOSS OF SENSATION IN FEET: 0

## 2025-03-05 ASSESSMENT — PAIN SCALES - GENERAL
PAINLEVEL_OUTOF10: 0-NO PAIN
PAINLEVEL_OUTOF10: 0-NO PAIN

## 2025-03-05 NOTE — PATIENT INSTRUCTIONS
Surveillance CT Neck with contrast around 5/30/25, lab at [place] a couple days before the scan.   Follow up with [facility] first week of June 2025.  Please hydrate well before and after your CT scan.

## 2025-03-05 NOTE — PROGRESS NOTES
Patient ID: Pilo Hanks is a 40 y.o. male.  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 Phi Cruz  MedOnc:  Dr. Kyunghee Burkitt/Shaila Fitzgerald PA-C  RadOnc: Dr. Raquel Blackman    Current Therapy  4/16 - 5/28/24: concurrent concurrent chemoRT with 2 doses of HD cisplatin and 70 gy RT    Sites of Disease  Left BOT  B/L cervical LNs    Oncologic Issues    ONCOLOGIC HISTORY  - 02/2024: Presented to PCP with mass in left tonsillar area. Referred to ENT  - 3/15/24: In office biopsy of left BOT w/ Dr Cruz.  Path showed p16 positive SCC  - 3/13/29/24: CT neck and chest showed a soft tissue mass within the left oropharynx centered in the anterior tonsillar pillar/left base of tongue, measures approximately 1.8 x 1.4cm.  - 4/1/24: PET/CT showed multiple hypermetabolic bilateral multilevel cervical lymphadenopathy, largest of which located at left cervical level 2, ~1.2cm.  - 4/5/24 [facility]: Rec surgical resection vs chemoRT vs clinical trials , [id number]    Admission  5/27 - 6/1 for worsening dysphagia and odynophagia with likely candidal esophagitis. NG was placed and pt cleared for soft solids and mildly thickened fluids.     Past Medical History:   Past Medical History:  01/21/2016: Acute tonsillitis, unspecified      Comment:  Acute tonsillitis, unspecified etiology  01/11/2017: Hypertrophy of tonsils      Comment:  Lingual tonsil hypertrophy  01/05/2017: Hypertrophy of tonsils      Comment:  Tonsillar hypertrophy  No date: Other specified health status      Comment:  No pertinent past medical history  09/14/2021: Pain in right toe(s)      Comment:  Great toe pain, right  09/15/2021: Pain in unspecified ankle and joints of unspecified foot      Comment:  Ankle pain  07/27/2021: Personal history of other diseases of the nervous system   and sense organs      Comment:  History of obstructive sleep apnea  07/27/2021:  Personal history of other diseases of the respiratory   system      Comment:  History of acute pharyngitis  2016: Personal history of other diseases of the respiratory   system      Comment:  History of sore throat  2017: Personal history of other diseases of the respiratory   system      Comment:  History of airway obstruction  2021: Personal history of other specified conditions      Comment:  History of chest pain  2021: Sprain of joints and ligaments of unspecified parts of   neck, initial encounter      Comment:  Neck sprain  2017: Sprain of unspecified ligament of right ankle, initial   encounter      Comment:  Sprain of right ankle, unspecified ligament, initial                encounter   Surgical History:    Past Surgical History:   Procedure Laterality Date    MOUTH SURGERY  2017    Oral Surgery Tooth Extraction    VASECTOMY Bilateral       Family History:    Family History   Problem Relation Name Age of Onset    No Known Problems Mother      No Known Problems Father      Diabetes Paternal Grandfather      Heart failure Paternal Grandfather      Testicular cancer Other Maternal Cousin 40 - 49    Prostate cancer Other Paternal uncle      Family Oncology History:    Cancer-related family history includes Prostate cancer in an other family member; Testicular cancer (age of onset: 40 - 49) in an other family member.  Social History:    Social History     Tobacco Use    Smoking status: Former     Current packs/day: 0.00     Average packs/day: 0.3 packs/day for 15.0 years (3.8 ttl pk-yrs)     Types: Cigarettes     Start date:      Quit date:      Years since quittin.1    Smokeless tobacco: Never   Vaping Use    Vaping status: Never Used   Substance Use Topics    Alcohol use: Not Currently    Drug use: Yes     Types: Marijuana     Comment: Edibles          Subjective   Chief Complaint: Squamous Cell Carcinoma of left base of tongue    HPI  Pilo Hanks is a 40 y.o.  male with recent diagnosis of T1N2M0 P16+ Squamous Cell Carcinoma of left BOT. Who  completed chemoRT w/ 2 cycles HD Cis and 70gy RT on 5/28/24.    Interval History  Patient presents for 9 month follow up.    Reviewed his recent surveillance CT N/C which showed a right 2A LN that is mildly enhancing and more prominent than prior. Stable 0.3cm pleural based nodules in the LLL, no new lesions anywhere.   Patient reports he had a cold recently and presented to the ED on 2/12 for a painful nodule on the right side of nose for which he was treated with Bactrim and Bactroban and the nodule resolved.   Otherwise he is overall feeling very well and energy and stamina are good.   He has some TMJ stiffness and muscle tightness with yawning but no other complaints. Xerostomia is chronic but manageable.     ROS  Review of Systems   Constitutional:  Negative for chills and fever.   HENT:   Negative for hearing loss, lump/mass, mouth sores, nosebleeds, sore throat, tinnitus and trouble swallowing.    Respiratory:  Negative for cough and shortness of breath.    Cardiovascular:  Negative for chest pain and leg swelling.   Gastrointestinal:  Negative for abdominal pain, constipation, nausea and vomiting.   Musculoskeletal:  Negative for arthralgias.   Skin:  Negative for rash.   Neurological:  Negative for headaches, light-headedness and numbness.     Allergies  No Known Allergies     Medications  Current Outpatient Medications   Medication Instructions    allopurinol (ZYLOPRIM) 100 mg, oral, Daily    colchicine 0.6 mg tablet Use 2 tbs at the sign of gout PO can repeat in 1 hr if still with pain X 1    zinc sulfate (Zincate) 220 (50 Zn) MG capsule 50 mg of elemental zinc, oral, 2 times daily      Objective   VS: BP (!) 134/93   Pulse 69   Temp 36.2 °C (97.2 °F) (Esophageal)   Resp 18   Wt 66.4 kg (146 lb 6.2 oz)   SpO2 99%   BMI 22.26 kg/m²   Weight: Daily Weight  03/05/25 : 66.2 kg (146 lb)  03/05/25 : 66.4 kg (146 lb 6.2  oz)  11/29/24 : 64.4 kg (141 lb 15.6 oz)  11/07/24 : 64.9 kg (143 lb)  10/31/24 : 64 kg (141 lb)  10/31/24 : 64.1 kg (141 lb 5 oz)  09/27/24 : 64.5 kg (142 lb 3.2 oz)    Physical Exam  Vitals reviewed.   Constitutional:       Appearance: Normal appearance. He is well-developed.   HENT:      Head: Normocephalic and atraumatic.      Right Ear: Hearing and external ear normal.      Left Ear: Hearing and external ear normal.      Nose: Nose normal.      Mouth/Throat:      Lips: Pink.      Mouth: Mucous membranes are moist. No oral lesions.      Tongue: No lesions.      Palate: No mass.      Comments: Mucositis in left posterior oropharynx  Eyes:      General: Lids are normal.      Extraocular Movements: Extraocular movements intact.      Pupils: Pupils are equal, round, and reactive to light.   Neck:      Thyroid: No thyroid mass.   Cardiovascular:      Rate and Rhythm: Normal rate and regular rhythm.      Pulses: Normal pulses.   Pulmonary:      Effort: Pulmonary effort is normal.      Breath sounds: Normal breath sounds.   Abdominal:      General: Bowel sounds are normal.   Musculoskeletal:         General: Normal range of motion.      Cervical back: Full passive range of motion without pain and normal range of motion. No pain with movement or muscular tenderness.   Lymphadenopathy:      Cervical: No cervical adenopathy.      Right cervical: No superficial cervical adenopathy.     Left cervical: No superficial cervical adenopathy.   Skin:     General: Skin is warm.   Neurological:      General: No focal deficit present.      Mental Status: He is alert and oriented to person, place, and time.   Psychiatric:         Mood and Affect: Mood normal.         Behavior: Behavior is cooperative.         Diagnostic Results   Labs  Labs from 2/24 are reviewed, noted Creat 1.40, GFR 65. Hgb 12.4                         Images  4/1/24 PET CT:  IMPRESSION:  1. Ill-defined large focal hypermetabolic soft tissue lesion centered in the  anterior tonsillar pillar/left base of tongue bulging into the oropharynx, consistent with biopsy-proven squamous cell carcinoma.  2. Hypermetabolic focus at right tongue base, which could be physiologic, although neoplastic disease can not be excluded.  3. Multiple hypermetabolic bilateral multilevel cervical lymphadenopathy, largest of which located at left cervical level 2, as detailed above, compatible with locoregional metastatic omer  disease.  4. No evidence of hypermetabolic distant metastatic disease.    08/29/24 CT SOFT TISSUE NECK W IV CONTRAST  Interval resolution of enhancing lesion in the left oropharynx, vconsistent with treatment response. Persistent prominent lingual tonsil noted.    Interval decreased size of left-sided cervical lymph nodes, consistent with treatment response. No cervical lymphadenopathy by size criteria.    08/29/24 NM PET CT Whole Body  IMPRESSION:  1. Decreased but persistent FDG uptake is seen in the oropharynx, compatible with residual viable neoplasm.  2. Interval resolution of the previously noted hypermetabolic cervical lymph nodes.  3. No hypermetabolic malignancy elsewhere.    3/1/2025 CT chest w IV contrast  Impression:   1.  No evidence of metastatic disease within the chest. 2. Stable size of a 0.3 cm pleural-based nodule within the left lower lobe. No new or enlarging discrete suspicious pulmonary nodules. 3. Remaining chronic and incidental findings as described above.     2/28/2025 CT soft tissue neck w IV contrast  Impression:   1. Stable nonspecific mucosal thickening of the glossotonsillar sulci likely reflecting postradiation change. No abnormal enhancement or discrete mass to indicate local disease recurrence.   2. Scattered nonenlarged but mildly enhancing and more prominent appearing lymph nodes in the right 2A station, likely reactive, however continued attention on follow-up is recommended.   3. Left maxillary sinus disease.     Pathology  No results found  "for: \"KBXHX\", \"PATHREP\", \"INTERP\", \"ADD1\", \"ADD2\", \"ADD3\", \"CORRECTIONHX\", \"ASTUDIES\", \"ANCILLARY1\", \"ANCILLARY2\", \"FINALINTERP\", \"COMDX\"      Assessment/Plan   Mr. Hanks is 40 y/o male with recent diagnosis of T2N2M0, P16+  left base of tongue squamous cell carcinoma who completed concurrent concurrent chemoRT with 2 doses of HD cisplatin and 70 gy RT on 5/28/24.    # Stage II (T1N2M0) p16 positive left base of tongue squamous cell carcinoma   - PET/CT showed uptake in left base of tongue, measures approximately 1.8 x 1.4cm as well as multiple hypermetabolic bilateral multilevel cervical lymphadenopathy, largest of which located at left cervical level 2, ~1.2cm.  - GFR >90 (3/25/24 labs), no hearing deficits  - Discussed with patient about SOC (concurrent chemorad with high dose cisplatin) vs clinical trial (: High dose cisplatin vs weekly cis or [id number]: CRT followed by Nivolumab), patient might be interested in clinical trial, he will think about it over the weekend and let us know.  - 4/16/24: Patient started on chemoRT w/ HD Cisplatin. Feeling well, left tongue mass not subjectively increased in size. Mild dysphagia. Patient is deciding on 6 vs 7 weeks of RT and will get back to us. Patient is young (38yo) and has 3 children ages 3, 9 and 16 at home. Will refer him to Chaya Augustine NP for young adult services.    - 4/25/24: post chemo he experienced hiccups, likely from dex, has few episodes of diarrhea but resolved.  Otherwise, tolerated chemo well.  Cre 1.48 GFR 61 (baseline 73-83), improved from 4/19 labs, but will give 1L hydration today.  - 5/7/24: Patient tolerated C1 well but had some decrease in renal function, and hiccups that were not resolved with Baclofen. He had very good response so far as his left BOT mass is no longer visit. He does have mucositis in this area and has increasing odynophagia. Continue current pain regimen, continue Boost VHC x2/day.  - 6/6/24: Admitted 5/27 - 6/1 for worsening " dysphagia and odynophagia with likely candidal esophagitis. NG was placed and pt cleared for soft solids and mildly thickened fluids. He still has mucositis evident is oropharynx w/odynophagia and dysphagia, taking Gabapentin  800mg TID.   - 6/27/24: Patient doing well 1 month from treatment. Odynophagia is largely resolved and he's tolerating a regular diet and ready for NG to be removed. Had 3 lb weight gain in 3 weeks. Still has lingering SE such as xerostomia, dysgeusia but are improving.   - 8/29/24 PET showed Decreased but persistent FDG uptake is seen in the oropharynx, compatible with residual viable neoplasm. Interval resolution of the previously noted hypermetabolic cervical lymph nodes. No hypermetabolic malignancy elsewhere. CT Neck showed interval resolution of left oropharyngeal lesion.  - 3/6/25: Patient doing very well 9 months from treatment. Odynophagia/dysphagia resolved, tolerating regular diet. Has chronic dry mouth and dysgeusia but both are improving. There is very mild lymphedema in the submental region, has occasional neck muscle stiffness. Recent surveillance scans showed enhancement in a right level 2A LN, will follow up with a CT Neck in 3 month to monitor.     # Right hand numbness  - Numbness/tinging in first 3 digits, could be carpal tunnel syndrome. Patient is an  and is using keyboard and mouse frequently. Suggest wearing wrist brace at night.    # Dysgeusia  - 8/1/24: improving, still unable to taste sweetness and is sensitive to spices. Will continue Zinc Sulfate 50mg TID  - 3/5/25: taste is mostly back to normal    # DELON: resolved  - During admission, Cr around 1.4.   - 6/6/: Cr 1.47, GFR 61. 1L NS given for hydration  - 6/27: Cr 1.34, GFR 69. Kidney function improving. Patient reports he's drinking plenty of water. Will continue to encourage PO intake. If throat pain occurs, suggest Tylenol over NSAIDs  - 8/1: Cr 1.35, GFR 68. Renal function about the same as 2 months  ago. Patient reports drinking about 60oz fluids PO. This may be patients new baseline kidney function.   - 8/27: Cr 1.32, GFR 70.     PLAN  -- 5/30/25 CT Neck, labs prior cbc, cmp  -- RTC 3 months w/ Medonc, Radonc on 6/6, carotid US same day  -- Continue ample PO hydration, especially before and after CT scan w/ IV contrast

## 2025-05-26 DIAGNOSIS — C10.9 MALIGNANT NEOPLASM OF OROPHARYNX: ICD-10-CM

## 2025-05-29 ENCOUNTER — LAB (OUTPATIENT)
Dept: LAB | Facility: HOSPITAL | Age: 40
End: 2025-05-29
Payer: COMMERCIAL

## 2025-05-29 DIAGNOSIS — Z08 ENCOUNTER FOR FOLLOW-UP EXAMINATION AFTER COMPLETED TREATMENT FOR MALIGNANT NEOPLASM: ICD-10-CM

## 2025-05-29 DIAGNOSIS — C10.9 MALIGNANT NEOPLASM OF OROPHARYNX, UNSPECIFIED: Primary | ICD-10-CM

## 2025-05-29 LAB
ALBUMIN SERPL BCP-MCNC: 4.7 G/DL (ref 3.4–5)
ALP SERPL-CCNC: 43 U/L (ref 33–120)
ALT SERPL W P-5'-P-CCNC: 13 U/L (ref 10–52)
ANION GAP SERPL CALC-SCNC: 15 MMOL/L (ref 10–20)
AST SERPL W P-5'-P-CCNC: 15 U/L (ref 9–39)
BASOPHILS # BLD AUTO: 0.03 X10*3/UL (ref 0–0.1)
BASOPHILS NFR BLD AUTO: 0.6 %
BILIRUB SERPL-MCNC: 0.4 MG/DL (ref 0–1.2)
BUN SERPL-MCNC: 23 MG/DL (ref 6–23)
CALCIUM SERPL-MCNC: 8.9 MG/DL (ref 8.6–10.6)
CHLORIDE SERPL-SCNC: 101 MMOL/L (ref 98–107)
CO2 SERPL-SCNC: 26 MMOL/L (ref 21–32)
CREAT SERPL-MCNC: 1.39 MG/DL (ref 0.5–1.3)
EGFRCR SERPLBLD CKD-EPI 2021: 65 ML/MIN/1.73M*2
EOSINOPHIL # BLD AUTO: 0.18 X10*3/UL (ref 0–0.7)
EOSINOPHIL NFR BLD AUTO: 3.5 %
ERYTHROCYTE [DISTWIDTH] IN BLOOD BY AUTOMATED COUNT: 13.1 % (ref 11.5–14.5)
GLUCOSE SERPL-MCNC: 92 MG/DL (ref 74–99)
HCT VFR BLD AUTO: 37.2 % (ref 41–52)
HGB BLD-MCNC: 12.3 G/DL (ref 13.5–17.5)
IMM GRANULOCYTES # BLD AUTO: 0.03 X10*3/UL (ref 0–0.7)
IMM GRANULOCYTES NFR BLD AUTO: 0.6 % (ref 0–0.9)
LYMPHOCYTES # BLD AUTO: 1.05 X10*3/UL (ref 1.2–4.8)
LYMPHOCYTES NFR BLD AUTO: 20.4 %
MCH RBC QN AUTO: 29.6 PG (ref 26–34)
MCHC RBC AUTO-ENTMCNC: 33.1 G/DL (ref 32–36)
MCV RBC AUTO: 90 FL (ref 80–100)
MONOCYTES # BLD AUTO: 0.41 X10*3/UL (ref 0.1–1)
MONOCYTES NFR BLD AUTO: 8 %
NEUTROPHILS # BLD AUTO: 3.45 X10*3/UL (ref 1.2–7.7)
NEUTROPHILS NFR BLD AUTO: 66.9 %
NRBC BLD-RTO: 0 /100 WBCS (ref 0–0)
PLATELET # BLD AUTO: 300 X10*3/UL (ref 150–450)
POTASSIUM SERPL-SCNC: 4.4 MMOL/L (ref 3.5–5.3)
PROT SERPL-MCNC: 7.7 G/DL (ref 6.4–8.2)
RBC # BLD AUTO: 4.15 X10*6/UL (ref 4.5–5.9)
SODIUM SERPL-SCNC: 138 MMOL/L (ref 136–145)
WBC # BLD AUTO: 5.2 X10*3/UL (ref 4.4–11.3)

## 2025-05-29 PROCEDURE — 80053 COMPREHEN METABOLIC PANEL: CPT

## 2025-05-29 PROCEDURE — 85025 COMPLETE CBC W/AUTO DIFF WBC: CPT

## 2025-05-29 PROCEDURE — 36415 COLL VENOUS BLD VENIPUNCTURE: CPT

## 2025-05-30 ENCOUNTER — APPOINTMENT (OUTPATIENT)
Dept: HEMATOLOGY/ONCOLOGY | Facility: HOSPITAL | Age: 40
End: 2025-05-30
Payer: COMMERCIAL

## 2025-05-30 ENCOUNTER — HOSPITAL ENCOUNTER (OUTPATIENT)
Dept: RADIOLOGY | Facility: CLINIC | Age: 40
End: 2025-05-30
Payer: COMMERCIAL

## 2025-06-02 ENCOUNTER — HOSPITAL ENCOUNTER (OUTPATIENT)
Dept: RADIOLOGY | Facility: CLINIC | Age: 40
Discharge: HOME | End: 2025-06-02
Payer: COMMERCIAL

## 2025-06-02 DIAGNOSIS — C10.9 MALIGNANT NEOPLASM OF OROPHARYNX: ICD-10-CM

## 2025-06-02 PROCEDURE — 70491 CT SOFT TISSUE NECK W/DYE: CPT

## 2025-06-02 PROCEDURE — 70491 CT SOFT TISSUE NECK W/DYE: CPT | Performed by: RADIOLOGY

## 2025-06-02 PROCEDURE — 2550000001 HC RX 255 CONTRASTS: Performed by: STUDENT IN AN ORGANIZED HEALTH CARE EDUCATION/TRAINING PROGRAM

## 2025-06-02 RX ADMIN — IOHEXOL 68 ML: 350 INJECTION, SOLUTION INTRAVENOUS at 10:16

## 2025-06-03 ENCOUNTER — TELEPHONE (OUTPATIENT)
Dept: RADIATION ONCOLOGY | Facility: HOSPITAL | Age: 40
End: 2025-06-03
Payer: COMMERCIAL

## 2025-06-06 ENCOUNTER — TELEMEDICINE (OUTPATIENT)
Dept: HEMATOLOGY/ONCOLOGY | Facility: HOSPITAL | Age: 40
End: 2025-06-06
Payer: COMMERCIAL

## 2025-06-06 ENCOUNTER — HOSPITAL ENCOUNTER (OUTPATIENT)
Dept: RADIATION ONCOLOGY | Facility: HOSPITAL | Age: 40
Setting detail: RADIATION/ONCOLOGY SERIES
Discharge: HOME | End: 2025-06-06
Payer: COMMERCIAL

## 2025-06-06 ENCOUNTER — HOSPITAL ENCOUNTER (OUTPATIENT)
Dept: VASCULAR MEDICINE | Facility: HOSPITAL | Age: 40
Discharge: HOME | End: 2025-06-06
Payer: COMMERCIAL

## 2025-06-06 ENCOUNTER — OFFICE VISIT (OUTPATIENT)
Dept: HEMATOLOGY/ONCOLOGY | Facility: HOSPITAL | Age: 40
End: 2025-06-06
Payer: COMMERCIAL

## 2025-06-06 VITALS
OXYGEN SATURATION: 100 % | SYSTOLIC BLOOD PRESSURE: 136 MMHG | HEART RATE: 84 BPM | BODY MASS INDEX: 22.83 KG/M2 | DIASTOLIC BLOOD PRESSURE: 87 MMHG | WEIGHT: 150.13 LBS | RESPIRATION RATE: 18 BRPM | TEMPERATURE: 97.9 F

## 2025-06-06 VITALS
TEMPERATURE: 97.9 F | OXYGEN SATURATION: 100 % | SYSTOLIC BLOOD PRESSURE: 136 MMHG | WEIGHT: 150.13 LBS | HEART RATE: 84 BPM | RESPIRATION RATE: 18 BRPM | DIASTOLIC BLOOD PRESSURE: 87 MMHG | BODY MASS INDEX: 22.83 KG/M2

## 2025-06-06 DIAGNOSIS — Z08 ENCOUNTER FOR FOLLOW-UP SURVEILLANCE OF HEAD AND NECK CANCER: ICD-10-CM

## 2025-06-06 DIAGNOSIS — Z85.89 ENCOUNTER FOR FOLLOW-UP SURVEILLANCE OF HEAD AND NECK CANCER: ICD-10-CM

## 2025-06-06 DIAGNOSIS — Z51.5 ENCOUNTER FOR PALLIATIVE CARE: ICD-10-CM

## 2025-06-06 DIAGNOSIS — Z71.82 EXERCISE COUNSELING: ICD-10-CM

## 2025-06-06 DIAGNOSIS — R25.2 TRISMUS: ICD-10-CM

## 2025-06-06 DIAGNOSIS — C10.9 MALIGNANT NEOPLASM OF OROPHARYNX: ICD-10-CM

## 2025-06-06 DIAGNOSIS — I89.0 LYMPHEDEMA DUE TO AND NOT CONCURRENT WITH IONIZING RADIOTHERAPY: ICD-10-CM

## 2025-06-06 DIAGNOSIS — Z71.3 DIETARY COUNSELING: ICD-10-CM

## 2025-06-06 DIAGNOSIS — M43.6 NECK STIFFNESS: ICD-10-CM
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
## 2025-06-06 DIAGNOSIS — C10.9 MALIGNANT NEOPLASM OF OROPHARYNX: Primary | ICD-10-CM

## 2025-06-06 DIAGNOSIS — R09.89 OTHER SPECIFIED SYMPTOMS AND SIGNS INVOLVING THE CIRCULATORY AND RESPIRATORY SYSTEMS: ICD-10-CM

## 2025-06-06 DIAGNOSIS — Z92.3 HISTORY OF HEAD AND NECK RADIATION: ICD-10-CM

## 2025-06-06 PROCEDURE — 1036F TOBACCO NON-USER: CPT | Performed by: STUDENT IN AN ORGANIZED HEALTH CARE EDUCATION/TRAINING PROGRAM

## 2025-06-06 PROCEDURE — 31575 DIAGNOSTIC LARYNGOSCOPY: CPT

## 2025-06-06 PROCEDURE — 99215 OFFICE O/P EST HI 40 MIN: CPT

## 2025-06-06 PROCEDURE — 99214 OFFICE O/P EST MOD 30 MIN: CPT | Performed by: NURSE PRACTITIONER

## 2025-06-06 PROCEDURE — 93880 EXTRACRANIAL BILAT STUDY: CPT | Performed by: INTERNAL MEDICINE

## 2025-06-06 PROCEDURE — 93880 EXTRACRANIAL BILAT STUDY: CPT

## 2025-06-06 PROCEDURE — 1036F TOBACCO NON-USER: CPT | Performed by: NURSE PRACTITIONER

## 2025-06-06 PROCEDURE — 99215 OFFICE O/P EST HI 40 MIN: CPT | Performed by: STUDENT IN AN ORGANIZED HEALTH CARE EDUCATION/TRAINING PROGRAM

## 2025-06-06 RX ORDER — LIDOCAINE HYDROCHLORIDE 20 MG/ML
INJECTION, SOLUTION EPIDURAL; INFILTRATION; INTRACAUDAL; PERINEURAL
Status: DISCONTINUED
Start: 2025-06-06 | End: 2025-06-07 | Stop reason: HOSPADM

## 2025-06-06 ASSESSMENT — ENCOUNTER SYMPTOMS
NAUSEA: 0
EXTREMITY WEAKNESS: 0
SPEECH DIFFICULTY: 0
DIARRHEA: 0
LOSS OF SENSATION IN FEET: 0
CONSTIPATION: 0
TROUBLE SWALLOWING: 0
NUMBNESS: 0
HEMATURIA: 0
SORE THROAT: 0
FEVER: 0
LEG SWELLING: 0
CONSTIPATION: 0
ARTHRALGIAS: 0
SHORTNESS OF BREATH: 0
NERVOUS/ANXIOUS: 0
ABDOMINAL PAIN: 0
UNEXPECTED WEIGHT CHANGE: 0
DIFFICULTY URINATING: 0
SORE THROAT: 0
NECK PAIN: 0
APPETITE CHANGE: 0
TROUBLE SWALLOWING: 0
DIZZINESS: 0
ARTHRALGIAS: 0
TROUBLE SWALLOWING: 0
DEPRESSION: 0
VOMITING: 0
SORE THROAT: 0
DEPRESSION: 0
SLEEP DISTURBANCE: 0
LIGHT-HEADEDNESS: 0
CHILLS: 0
FATIGUE: 0
ABDOMINAL PAIN: 0
VOMITING: 0
NERVOUS/ANXIOUS: 0
CONSTIPATION: 0
NAUSEA: 0
WHEEZING: 0
OCCASIONAL FEELINGS OF UNSTEADINESS: 0
ABDOMINAL DISTENTION: 0
NAUSEA: 0
NUMBNESS: 0
BACK PAIN: 0
FATIGUE: 0
COUGH: 0
HEADACHES: 0
CHILLS: 0
DECREASED CONCENTRATION: 0
DIARRHEA: 0
SEIZURES: 0
SHORTNESS OF BREATH: 0
BLOOD IN STOOL: 0
LEG SWELLING: 0
COUGH: 0
EYE PROBLEMS: 0
HEADACHES: 0
CONFUSION: 0
NECK STIFFNESS: 0

## 2025-06-06 ASSESSMENT — PAIN SCALES - GENERAL
PAINLEVEL_OUTOF10: 0-NO PAIN
PAINLEVEL_OUTOF10: 0-NO PAIN

## 2025-06-06 NOTE — PROGRESS NOTES
Patient ID: Pilo Hanks is a 40 y.o. male.  Referring Physician: Dr. Burkitt/Shaila Fitzgerald PA-C  Primary Care Provider: Ruth Villa DO  ONCOLOGIC HISTORY  -02/2024: Presented to PCP with mass in left tonsillar area. Referred to ENT  -03/15/24: In office biopsy of left BOT w/ Dr Cruz.  Path showed p16 positive SCC  -03/13/29/24: CT neck and chest showed a soft tissue mass within the left oropharynx centered in the anterior tonsillar pillar/left base of tongue, measures approximately 1.8 x 1.4cm.  -04/1/24: PET/CT showed multiple hypermetabolic bilateral multilevel cervical lymphadenopathy, largest of which located at left cervical level 2, ~1.2cm.  -04/5/24 [facility]: Rec surgical resection vs chemoRT vs clinical trials , [id number]  -04/16/24: C1D1 HD Cisplatin, completed 2 cycles on 05/27/24, C3 omitted  -05/28/24: Completed radiation therapy 7000cGy in 35 fractions     Cumulative doses:  cisplatin 200mg/m2  radiation 7000cGy in 35 fractions, to L base of tongue/lymph nodes/neck    Subjective    Patient presents today for follow up regarding issues unique to young adults with cancer.     Overall, he reports feeling very well. Has good energy and appetite. Is exercising 1x/weekly at home on a stationary bicycle, occasionally plays basketball with his oldest son. Eating a range of foods, minimizing red meat intake, rare ETOH no tobacco or vaping, incorporating fruits, vegetables, and whole grains.     Denies any oral mouth changes, no gingival bleeding, lesions, or soreness noted. Does have am oral dryness - resolves with using water. Does have intermittent jaw tightness, he does jaw stretches daily. Follows with dentist for oral exams/cleanings q6 mo - no issues.     No skin changes to face/neck, does note absence of hair growth in radiation field. Specifically denies any lesions or pigment changes. Does not wear sunscreen.     Denies problems with hearing, able to hear range of pitches/frequencies, denies tinnitus.      Denies chest pain, no palpitations, no neck or upper extremity swelling. Does follow annually with PCP - had lipid panel drawn 09/2024 and had elevated total and LDL cholesterol - working on diet modifications.     Mood is good, he denies feelings of depression or anxiety. Sleeping 7-8 hours/night. Denies concerns with anxiety prior to imaging or appointments.     Is sexually active, no concerns with sexual function.      PMH: - acute tonsillitis recurrent, DAMON, multiple sprained joints  PSxH: wisdom teeth extraction, vasectomy 2023    Social History: Grew up in [place], has one sister. Now lives in [place] with his wife and three children José Miguel (16), Domingo (10), and Cathy (5). 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 Prior tobacco 0.3packs/day x 15 years (3.8 ttl pack years) cigarettes [id number], does drink ETOH, wine 0-2 drinks/week, daily marijuana edibles. Does not vape or use other recreational drugs.    Review of Systems   Constitutional:  Negative for appetite change, fatigue and unexpected weight change.   HENT:   Negative for hearing loss, sore throat, tinnitus and trouble swallowing.    Gastrointestinal:  Negative for constipation, diarrhea and nausea.   Psychiatric/Behavioral:  Negative for decreased concentration, depression and sleep disturbance. The patient is not nervous/anxious.      Objective   BSA: 1.81 meters squared  /87 (BP Location: Left arm, Patient Position: Sitting, BP Cuff Size: Adult)   Pulse 84   Temp 36.6 °C (97.9 °F) (Temporal)   Resp 18   Wt 68.1 kg (150 lb 2.1 oz)   SpO2 100%   BMI 22.83 kg/m²      Family History   Problem Relation Name Age of Onset    No Known Problems Mother      No Known Problems Father      Diabetes Paternal Grandfather      Heart failure Paternal Grandfather      Testicular cancer Other Maternal Cousin 40 - 49    Prostate cancer Other Paternal uncle      Oncology History   Malignant neoplasm of oropharynx    4/4/2024 Initial Diagnosis    Malignant neoplasm of oropharynx (CMS/HCC)     4/16/2024 -  Chemotherapy    CISplatin with Concurrent Radiation, 21 Day Cycles - Head and Neck       Pilo Hanks  reports that he quit smoking about 2 years ago. His smoking use included cigarettes. He started smoking about 17 years ago. He has a 3.8 pack-year smoking history. He has never used smokeless tobacco.  He  reports that he does not currently use alcohol.  He  reports current drug use. Drug: Marijuana.    Physical Exam  Constitutional:       General: He is not in acute distress.     Appearance: Normal appearance. He is normal weight. He is not ill-appearing.   Neurological:      General: No focal deficit present.      Mental Status: He is alert and oriented to person, place, and time.      Cranial Nerves: No cranial nerve deficit.      Motor: No weakness.   Psychiatric:         Mood and Affect: Mood normal.         Behavior: Behavior normal.         Thought Content: Thought content normal.         Judgment: Judgment normal.     Performance Status:  Asymptomatic    Assessment/Plan    Pilo Hanks is a 39 y.o. 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    #Diet/Exercise/Healthy Lifestyle:  -Reiterated research demonstrating consumption of a healthy, plant based diet not only decreases cancer risk, but also has been found to improve survival in cancer patients who partake in a healthy diet.   - Now eating well-balanced diet   - Discussed research that demonstrates decreased cancer risk and improved survival in cancer patients who exercise and stay active throughout diagnosis and treatment.  - Encouraged patient to continue weekly stationary bicycle and add in strength training weekly   - Encouraged patient to continue to abstain from tobacco, drink alcohol in moderation <2 drinks/week and avoid  recreational drugs    #Psychosocial: young adult and parent with cancer, adjusting well to illness and no acute needs  - Discussed resources available should he desire them in the future including psychiatry, psychology, spiritual care, expressive therapies, and peer support  - Patient is enrolled in [employer] and will receive young adult social programming event fliers    #Genetic Risk: young adult with cancer and personal family history of cancer  - Consider genetic risk evaluation given young age; does have hx of exposures possible HPV, tobacco, and ETOH    #Risk for hypogonadism: no current signs or symptoms of reduced testicular function  - Discussed potential for cancer treatments to effect male hormones, ejaculation and/or cause erectile dysfunction  - Send FSH, LH, testosterone for next blood draw   - Has completed family building, s/p vasectomy    #Preventative Health:  - Is connected with primary care provider and sees them annually for preventative screenings  - Follows with dental provider for twice annual exams and cleanings - emphasized importance of dental health, he is specifically at risk for dental caries, root decay, gingival issues, salivary gland dysfunction, dysphagia, and osteonecrosis of the jaw d/t prior radiation exposure  - Discussed regular skin screenings for any new lesions within radiation field  - Discussed use of sunscreen with prolonged sun exposure especially to field of irradiation  - Needs repeat lipid panel     #Late effects/survivorship care: cumulative doses of cisplatin 200mg/m2, and 7000cGy radiation to oropharynx   - Will provide patient with comprehensive care plan at 2 years out from completion of therapy 06/2026, will use COG guidelines for screening d/t age  - Per guidelines d/t cisplatin/radiation screen for emotional distress, sleep disorders, thyroid problems/annual TSH, CAD, osteonecrosis of the jaw, dental caries, root decay, gingival caries, skin cancer screenings,  risk for second malignancies (d/t cisplatin), changes in hearing/tinnitus, peripheral neuropathy, and renal toxicity    Follow up in one year     The amount of time that I spent with the patient:  Prep: 5  Time spend directly with patient: 25  Coordinating care:   Documentation: 5  Other time:    Total time spent on encounter: 60           Shaila Augustine, DOMINGA-CNP

## 2025-06-06 NOTE — PROGRESS NOTES
Patient ID: Plio Hanks is a 40 y.o. male.  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 Phi Cruz  MedOnc:  Dr. Kyunghee Burkitt/Shaila Fitzgerald PA-C  RadOnc: Dr. Raquel Blackman    Current Therapy  4/16 - 5/28/24: concurrent concurrent chemoRT with 2 doses of HD cisplatin and 70 gy RT    Sites of Disease  Left BOT  B/L cervical LNs    Oncologic Issues    ONCOLOGIC HISTORY  - 02/2024: Presented to PCP with mass in left tonsillar area. Referred to ENT  - 3/15/24: In office biopsy of left BOT w/ Dr Cruz.  Path showed p16 positive SCC  - 3/13/29/24: CT neck and chest showed a soft tissue mass within the left oropharynx centered in the anterior tonsillar pillar/left base of tongue, measures approximately 1.8 x 1.4cm.  - 4/1/24: PET/CT showed multiple hypermetabolic bilateral multilevel cervical lymphadenopathy, largest of which located at left cervical level 2, ~1.2cm.  - 4/5/24 [facility]: Rec surgical resection vs chemoRT vs clinical trials , [id number]    Admission  5/27 - 6/1 for worsening dysphagia and odynophagia with likely candidal esophagitis. NG was placed and pt cleared for soft solids and mildly thickened fluids.     Past Medical History:   Past Medical History:  01/21/2016: Acute tonsillitis, unspecified      Comment:  Acute tonsillitis, unspecified etiology  01/11/2017: Hypertrophy of tonsils      Comment:  Lingual tonsil hypertrophy  01/05/2017: Hypertrophy of tonsils      Comment:  Tonsillar hypertrophy  No date: Other specified health status      Comment:  No pertinent past medical history  09/14/2021: Pain in right toe(s)      Comment:  Great toe pain, right  09/15/2021: Pain in unspecified ankle and joints of unspecified foot      Comment:  Ankle pain  07/27/2021: Personal history of other diseases of the nervous system   and sense organs      Comment:  History of obstructive sleep apnea  07/27/2021:  Personal history of other diseases of the respiratory   system      Comment:  History of acute pharyngitis  2016: Personal history of other diseases of the respiratory   system      Comment:  History of sore throat  2017: Personal history of other diseases of the respiratory   system      Comment:  History of airway obstruction  2021: Personal history of other specified conditions      Comment:  History of chest pain  2021: Sprain of joints and ligaments of unspecified parts of   neck, initial encounter      Comment:  Neck sprain  2017: Sprain of unspecified ligament of right ankle, initial   encounter      Comment:  Sprain of right ankle, unspecified ligament, initial                encounter   Surgical History:    Past Surgical History:   Procedure Laterality Date   • MOUTH SURGERY  2017    Oral Surgery Tooth Extraction   • VASECTOMY Bilateral       Family History:    Family History   Problem Relation Name Age of Onset   • No Known Problems Mother     • No Known Problems Father     • Diabetes Paternal Grandfather     • Heart failure Paternal Grandfather     • Testicular cancer Other Maternal Cousin 40 - 49   • Prostate cancer Other Paternal uncle      Family Oncology History:    Cancer-related family history includes Prostate cancer in an other family member; Testicular cancer (age of onset: 40 - 49) in an other family member.  Social History:    Social History     Tobacco Use   • Smoking status: Former     Current packs/day: 0.00     Average packs/day: 0.3 packs/day for 15.0 years (3.8 ttl pk-yrs)     Types: Cigarettes     Start date:      Quit date:      Years since quittin.4   • Smokeless tobacco: Never   Vaping Use   • Vaping status: Never Used   Substance Use Topics   • Alcohol use: Not Currently   • Drug use: Yes     Types: Marijuana     Comment: Edibles          Subjective   Chief Complaint: Squamous Cell Carcinoma of left base of tongue    HPI  Pilo Hanks  is a 40 y.o. male with recent diagnosis of T1N2M0 P16+ Squamous Cell Carcinoma of left BOT. Who  completed chemoRT w/ 2 cycles HD Cis and 70gy RT on 5/28/24.    Interval History  Virtual or Telephone Consent    An interactive audio and video telecommunication system which permits real time communications between the patient (at the originating site) and provider (at the distant site) was utilized to provide this telehealth service.   Verbal consent was requested and obtained from Pilo Hanks on this date, 06/06/25 for a telehealth visit and the patient's location was confirmed at the time of the visit.    Patient presents for 12  month follow up and surveillance scan review.     He's doing very well energy, stamina, appetite are good.  Has improvement in dry mouth, usually dry in the morning, managed with water, and taste is mostly back to normal.   Still has tightness in b/l jaw when opening mouth wide, but denies pain, denies pain w/ mastication.   Denies hearing changes, tinnitus.      Reviewed his recent surveillance CT N/C which showed a right 2A LN that is mildly enhancing and more prominent than prior. Stable 0.3cm pleural based nodules in the LLL, no new lesions anywhere.   Patient reports he had a cold recently and presented to the ED on 2/12 for a painful nodule on the right side of nose for which he was treated with Bactrim and Bactroban and the nodule resolved.   Otherwise he is overall feeling very well and energy and stamina are good.   He has some TMJ stiffness and muscle tightness with yawning but no other complaints. Xerostomia is chronic but manageable.     ROS  Review of Systems   Constitutional:  Negative for chills and fever.   HENT:   Negative for hearing loss, lump/mass, mouth sores, nosebleeds, sore throat, tinnitus and trouble swallowing.    Respiratory:  Negative for cough and shortness of breath.    Cardiovascular:  Negative for chest pain and leg swelling.   Gastrointestinal:  Negative for  abdominal pain, constipation, nausea and vomiting.   Musculoskeletal:  Negative for arthralgias.   Skin:  Negative for rash.   Neurological:  Negative for headaches, light-headedness and numbness.     Allergies  No Known Allergies     Medications  Current Outpatient Medications   Medication Instructions   • allopurinol (ZYLOPRIM) 100 mg, oral, Daily   • colchicine 0.6 mg tablet Use 2 tbs at the sign of gout PO can repeat in 1 hr if still with pain X 1   • zinc sulfate (Zincate) 220 (50 Zn) MG capsule 50 mg of elemental zinc, oral, 2 times daily      Objective   VS: There were no vitals taken for this visit.  Weight: Daily Weight  03/05/25 : 66.2 kg (146 lb)  03/05/25 : 66.4 kg (146 lb 6.2 oz)  11/29/24 : 64.4 kg (141 lb 15.6 oz)  11/07/24 : 64.9 kg (143 lb)  10/31/24 : 64 kg (141 lb)  10/31/24 : 64.1 kg (141 lb 5 oz)  09/27/24 : 64.5 kg (142 lb 3.2 oz)    Physical Exam  Vitals reviewed.   Constitutional:       Appearance: Normal appearance. He is well-developed.   HENT:      Head: Normocephalic and atraumatic.      Right Ear: Hearing and external ear normal.      Left Ear: Hearing and external ear normal.      Nose: Nose normal.      Mouth/Throat:      Lips: Pink.      Mouth: Mucous membranes are moist. No oral lesions.      Tongue: No lesions.      Palate: No mass.      Comments: Mucositis in left posterior oropharynx  Eyes:      General: Lids are normal.      Extraocular Movements: Extraocular movements intact.      Pupils: Pupils are equal, round, and reactive to light.   Neck:      Thyroid: No thyroid mass.   Cardiovascular:      Rate and Rhythm: Normal rate and regular rhythm.      Pulses: Normal pulses.   Pulmonary:      Effort: Pulmonary effort is normal.      Breath sounds: Normal breath sounds.   Abdominal:      General: Bowel sounds are normal.   Musculoskeletal:         General: Normal range of motion.      Cervical back: Full passive range of motion without pain and normal range of motion. No pain with  movement or muscular tenderness.   Lymphadenopathy:      Cervical: No cervical adenopathy.      Right cervical: No superficial cervical adenopathy.     Left cervical: No superficial cervical adenopathy.   Skin:     General: Skin is warm.   Neurological:      General: No focal deficit present.      Mental Status: He is alert and oriented to person, place, and time.   Psychiatric:         Mood and Affect: Mood normal.         Behavior: Behavior is cooperative.       Diagnostic Results   Labs  Labs from 2/24 are reviewed, noted Creat 1.40, GFR 65. Hgb 12.4                         Images  4/1/24 PET CT:  IMPRESSION:  1. Ill-defined large focal hypermetabolic soft tissue lesion centered in the anterior tonsillar pillar/left base of tongue bulging into the oropharynx, consistent with biopsy-proven squamous cell carcinoma.  2. Hypermetabolic focus at right tongue base, which could be physiologic, although neoplastic disease can not be excluded.  3. Multiple hypermetabolic bilateral multilevel cervical lymphadenopathy, largest of which located at left cervical level 2, as detailed above, compatible with locoregional metastatic omer  disease.  4. No evidence of hypermetabolic distant metastatic disease.    08/29/24 CT SOFT TISSUE NECK W IV CONTRAST  Interval resolution of enhancing lesion in the left oropharynx, vconsistent with treatment response. Persistent prominent lingual tonsil noted.    Interval decreased size of left-sided cervical lymph nodes, consistent with treatment response. No cervical lymphadenopathy by size criteria.    08/29/24 NM PET CT Whole Body  IMPRESSION:  1. Decreased but persistent FDG uptake is seen in the oropharynx, compatible with residual viable neoplasm.  2. Interval resolution of the previously noted hypermetabolic cervical lymph nodes.  3. No hypermetabolic malignancy elsewhere.    3/1/2025 CT chest w IV contrast  Impression:   1.  No evidence of metastatic disease within the chest. 2.  "Stable size of a 0.3 cm pleural-based nodule within the left lower lobe. No new or enlarging discrete suspicious pulmonary nodules. 3. Remaining chronic and incidental findings as described above.     2/28/2025 CT soft tissue neck w IV contrast  Impression:   1. Stable nonspecific mucosal thickening of the glossotonsillar sulci likely reflecting postradiation change. No abnormal enhancement or discrete mass to indicate local disease recurrence.   2. Scattered nonenlarged but mildly enhancing and more prominent appearing lymph nodes in the right 2A station, likely reactive, however continued attention on follow-up is recommended.   3. Left maxillary sinus disease.     Pathology  No results found for: \"KBXHX\", \"PATHREP\", \"INTERP\", \"ADD1\", \"ADD2\", \"ADD3\", \"CORRECTIONHX\", \"ASTUDIES\", \"ANCILLARY1\", \"ANCILLARY2\", \"FINALINTERP\", \"COMDX\"      Assessment/Plan   Mr. Hanks is 40 y/o male with recent diagnosis of T2N2M0, P16+  left base of tongue squamous cell carcinoma who completed concurrent concurrent chemoRT with 2 doses of HD cisplatin and 70 gy RT on 5/28/24.    # Stage II (T1N2M0) p16 positive left base of tongue squamous cell carcinoma   - PET/CT showed uptake in left base of tongue, measures approximately 1.8 x 1.4cm as well as multiple hypermetabolic bilateral multilevel cervical lymphadenopathy, largest of which located at left cervical level 2, ~1.2cm.  - GFR >90 (3/25/24 labs), no hearing deficits  - Discussed with patient about SOC (concurrent chemorad with high dose cisplatin) vs clinical trial (: High dose cisplatin vs weekly cis or [id number]: CRT followed by Nivolumab), patient might be interested in clinical trial, he will think about it over the weekend and let us know.  - 4/16/24: Patient started on chemoRT w/ HD Cisplatin. Feeling well, left tongue mass not subjectively increased in size. Mild dysphagia. Patient is deciding on 6 vs 7 weeks of RT and will get back to us. Patient is young (40yo) and has 3 " children ages 3, 9 and 16 at home. Will refer him to Chaya Augustine NP for young adult services.    - 4/25/24: post chemo he experienced hiccups, likely from dex, has few episodes of diarrhea but resolved.  Otherwise, tolerated chemo well.  Cre 1.48 GFR 61 (baseline 73-83), improved from 4/19 labs, but will give 1L hydration today.  - 5/7/24: Patient tolerated C1 well but had some decrease in renal function, and hiccups that were not resolved with Baclofen. He had very good response so far as his left BOT mass is no longer visit. He does have mucositis in this area and has increasing odynophagia. Continue current pain regimen, continue Boost VHC x2/day.  - 6/6/24: Admitted 5/27 - 6/1 for worsening dysphagia and odynophagia with likely candidal esophagitis. NG was placed and pt cleared for soft solids and mildly thickened fluids. He still has mucositis evident is oropharynx w/odynophagia and dysphagia, taking Gabapentin  800mg TID.   - 6/27/24: Patient doing well 1 month from treatment. Odynophagia is largely resolved and he's tolerating a regular diet and ready for NG to be removed. Had 3 lb weight gain in 3 weeks. Still has lingering SE such as xerostomia, dysgeusia but are improving.   - 8/29/24 PET showed Decreased but persistent FDG uptake is seen in the oropharynx, compatible with residual viable neoplasm. Interval resolution of the previously noted hypermetabolic cervical lymph nodes. No hypermetabolic malignancy elsewhere. CT Neck showed interval resolution of left oropharyngeal lesion.  - 3/6/25: Patient doing very well 9 months from treatment. Odynophagia/dysphagia resolved, tolerating regular diet. Has chronic dry mouth and dysgeusia but both are improving. There is very mild lymphedema in the submental region, has occasional neck muscle stiffness. Recent surveillance scans showed enhancement in a right level 2A LN, will follow up with a CT Neck in 3 month to monitor.     # Right hand numbness  -  Numbness/tinging in first 3 digits, could be carpal tunnel syndrome. Patient is an  and is using keyboard and mouse frequently. Suggest wearing wrist brace at night.    # Dysgeusia  - 8/1/24: improving, still unable to taste sweetness and is sensitive to spices. Will continue Zinc Sulfate 50mg TID  - 3/5/25: taste is mostly back to normal    # DELON: resolved  - During admission, Cr around 1.4.   - 6/6/: Cr 1.47, GFR 61. 1L NS given for hydration  - 6/27: Cr 1.34, GFR 69. Kidney function improving. Patient reports he's drinking plenty of water. Will continue to encourage PO intake. If throat pain occurs, suggest Tylenol over NSAIDs  - 8/1: Cr 1.35, GFR 68. Renal function about the same as 2 months ago. Patient reports drinking about 60oz fluids PO. This may be patients new baseline kidney function.   - 8/27: Cr 1.32, GFR 70.     PLAN  -- 5/30/25 CT Neck, labs prior cbc, cmp  -- RTC 3 months w/ Medonc, Radonc on 6/6, carotid US same day  -- Continue ample PO hydration, especially before and after CT scan w/ IV contrast

## 2025-06-06 NOTE — PROGRESS NOTES
Radiation Oncology Follow-Up    Patient Name:  Pilo Hanks  MRN:  [id number]  :  1985    Referring Provider: Kenan Obrien, *  Primary Care Provider: Ruth Villa DO  Care Team: Patient Care Team:  Ruth Villa DO as PCP - General  Ruth Villa DO as PCP - [facility] PCP  Raquel Blackman MD as Radiation Oncologist (Radiation Oncology)  Kyunghee Burkitt, DO as Consulting Physician (Hematology and Oncology)  Rosina Fitzgerald PA-C as Physician Assistant (Hematology and Oncology)  Mary Ellsworth RN as Registered Nurse (Hematology and Oncology)    Date of Service: 2025    Diagnosis: Squamous Cell Carcinoma of left base of tongue, P16+  Staging: Stage II, T1N2M0    Sites of Disease  Left BOT  B/L cervical LNs     Oncologic Issues     ONCOLOGIC HISTORY  - 2024: Presented to PCP with mass in left tonsillar area. Referred to ENT  - 3/15/24: In office biopsy of left BOT w/ Dr Cruz.  Path showed p16 positive SCC  - 3/13/29/24: CT neck and chest showed a soft tissue mass within the left oropharynx centered in the anterior tonsillar pillar/left base of tongue, measures approximately 1.8 x 1.4cm.  - 24: PET/CT showed multiple hypermetabolic bilateral multilevel cervical lymphadenopathy, largest of which located at left cervical level 2, ~1.2cm.  - 24 HNTB: Rec surgical resection vs chemoRT vs clinical trials , [id number]    Treatment:   Chemoradiation (HD Cisplatin and VMAT-- 70Gy in 35 fx) ending on 24.     IMRT: Bilateral Head and neck    Treatment Period Technique Fraction Dose Fractions Total Dose   Course 1 2024-2024  (days elapsed: 38)         BOT + nodes 2024-2024 VMAT 200 / 200 cGy 35 / 35 7000 / 7,000 cGy     NavDx:   --24 positive.  --24 negative.   --24 negative.  --3/28/25 negative  --25 Pending     Past Medical History:   Diagnosis Date    Acute tonsillitis, unspecified 2016    Acute tonsillitis, unspecified etiology    Hypertrophy  of tonsils 2017    Lingual tonsil hypertrophy    Hypertrophy of tonsils 2017    Tonsillar hypertrophy    Other specified health status     No pertinent past medical history    Pain in right toe(s) 2021    Great toe pain, right    Pain in unspecified ankle and joints of unspecified foot 09/15/2021    Ankle pain    Personal history of other diseases of the nervous system and sense organs 2021    History of obstructive sleep apnea    Personal history of other diseases of the respiratory system 2021    History of acute pharyngitis    Personal history of other diseases of the respiratory system 2016    History of sore throat    Personal history of other diseases of the respiratory system 2017    History of airway obstruction    Personal history of other specified conditions 2021    History of chest pain    Sprain of joints and ligaments of unspecified parts of neck, initial encounter 2021    Neck sprain    Sprain of unspecified ligament of right ankle, initial encounter 2017    Sprain of right ankle, unspecified ligament, initial encounter      Past Surgical History:   Procedure Laterality Date    MOUTH SURGERY  2017    Oral Surgery Tooth Extraction    VASECTOMY Bilateral       Social History     Tobacco Use    Smoking status: Former     Current packs/day: 0.00     Average packs/day: 0.3 packs/day for 15.0 years (3.8 ttl pk-yrs)     Types: Cigarettes     Start date:      Quit date:      Years since quittin.4    Smokeless tobacco: Never   Substance Use Topics    Alcohol use: Not Currently      SUBJECTIVE  History of Present Illness:  Pilo Hanks is a 40 y.o. male who was previously seen at the [facility] Department of Radiation Oncology for SCC of the left BOT followed by Chemoradiation (HD Cisplatin and VMAT-- 70Gy in 35 fx) ending on 24.  Patient with recent hospitalization for dysphagia and  "malnutrition.  Saw SLP and was cleared for Soft solids with mildy thickened liquids. Candida noted in oropharynx on exam raising concern for candidal esophagitis vs radiation esophagitis or a mixed picture. Started on PPI, Sucralfate, and oral Fluconazole.  A Dobhoff was also placed, as a temporary measure to provide nutritional support.   Today the patient states that he's \"doing better\".   He's now getting most of his nutrition enterally (Boost VHC 3-4/day), however, he states that he's eating some soft foods.  Endorses that thin liquids such as water make him cough.  Denies choking while eating.  Endorses some significant dysgeusia and xerostomia. We discussed that he should get some taste and salivary function back over the next several months.   Continues to have odynophagia and is managing with Gabapentin.  He had been using BMX but didn't like the way the medication made him feel. Also endorses thick secretions.  A suction machine was ordered at this visit to help manage.  Heron new lumps/bumps/discolorations within his oral cavity or around his head/neck.   Denies chills, fever, SOB or chest pain.  Endorses slight fatigue and deconditioning.  Denies headaches, dizziness, instability, vision changes, hearing changes or focal sensory/motor deficits.  Denies abdominal pain, nausea, vomiting, or diarrhea.  Does c/o constipation.  We discussed, as did [facility], starting Miralax and the patient was agreeable.     8/29/24 Interval visit:   Today the patient states that he's doing well.  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  He continues to eat a regular diet without coughing or choking.  He continues to  endorse continued taste alterations, especially with sweet foods.  He also continues to have oral dryness and manages with hydration.  Denies dysphagia, mucositis, trismus or new lumps/bumps/discolorations in his mouth or around his head, neck or shoulders.  He does have some occasional soreness " "in the \"back of the tongue\".  Denies chills, fever, SOB or chest pain.  Endorses slight fatigue and deconditioning.  Denies headaches, dizziness, instability, vision changes, hearing changes or focal sensory/motor deficits.  Denies abdominal pain, nausea, vomiting, constipation, or diarrhea.       8/29/24 Flexible fiberoptic laryngopharyngoscopy  Performed via the nares, after topical anesthesia and decongestant was instilled in the right nares: Nasopharynx: There are no noted lesions in the nasopharynx. The bilateral torus  tubari and fossa of Rosenmueller show no lesions and Eustachian tube orifice normal. The mucosal surface of nasopharynx is clear without appreciated masses. The nasopharyngeal surface of the palate has no abnormal lesions. Oropharynx: The tonsillar fossa appears normal bilaterally. The tongue base and vallecula show no lesions. The lingual surface of the epiglottis is normal. The oropharynx is clear of any masses. Larynx: The laryngeal surface of epiglottis, the aryepiglottic folds, the false cords, and the arytenoids are clear of any lesions. The vocal cords have normal mobility and there are no noted lesions. There are no lesions noted in the subglottis. Hypopharynx: The hypopharynx appears normal. There are no noted lesions in the  pyriform sinus or postcricoid area. Scope was removed.  Thick secretions were noted throughout the exam. The patient tolerated the procedure well.     10/31/24 Interval FU Visit:   Today the patient is in clinic for a routine Radiation Oncology FU visit.  Patient states that he is doing very well.  He continues to eat a regular diet without any complaints of coughing or choking.  He still does complain of some taste alterations which continues to improve. Denies dysphagia, mucositis, trismus or new lumps/bumps/discolorations in his mouth or around his head, neck or shoulders.  He does have some occasional soreness in the \"back of the tongue\" with hot foods.  Denies " chills, fever, fatigue SOB or chest pain. Denies headaches, dizziness, instability, vision changes, hearing changes or focal sensory/motor deficits.  Denies abdominal pain, nausea, vomiting, constipation, or diarrhea.       3/5/25 Interval FU visit:   Today the patient is in clinic for a routine Radiation Oncology FU visit.  Overall, the patient states that he's doing well.   He continues to eat a regular diet without coughing or choking  He states that his taste is now back to baseline.  He does continue to have some oral dryness which is improved.  Denies dysphagia, mucositis, trismus or new lumps/bumps/discolorations in his mouth or around his head, neck or shoulders.  He does continue to have some intermittent soreness in the back of his tongue which is slowly improving.  Denies chills, fever, fatigue SOB or chest pain. Denies, dizziness, instability, vision changes, hearing changes or focal sensory/motor deficits.  He does  Denies abdominal pain, nausea, vomiting, constipation, or diarrhea.       3/5/25 Flexible fiberoptic laryngopharyngoscopy  Performed via the left nares, after topical anesthesia and decongestant was instilled in the right nares: Nasopharynx: There are no noted lesions in the nasopharynx. The bilateral torus tubari and fossa of Rosenmueller show no lesions and Eustachian tube orifice normal. The mucosal surface of nasopharynx is clear without appreciated masses. The nasopharyngeal surface of the palate has no abnormal lesions. Oropharynx: The tonsillar fossa appears normal bilaterally. The tongue base and vallecula show no lesions. The lingual surface of the epiglottis is normal. The oropharynx is clear of any masses. Larynx: The laryngeal surface of epiglottis, the aryepiglottic folds, the false cords, and the arytenoids are clear of any lesions. The vocal cords have normal mobility and there are no noted lesions. There are no lesions noted in the subglottis. Hypopharynx: The hypopharynx  appears normal. There are no noted lesions in the  pyriform sinus or postcricoid area. Scope was removed.  Thick secretions were noted throughout the exam. The patient tolerated the procedure well.     6/6/25 Interval FU visit:   Today the patient is in clinic for a routine Radiation Oncology FU visit and review of a carotid ultrasound completed today.  Per a Radiology read, there is <50% stenosis in the b/l carotid arteries.  This was discussed with the patient. Overall, the patient states that he's doing well.   He continues to eat a regular diet without coughing or choking  He states that his taste is now back to baseline.  He does continue to have some oral dryness which is improved.  Denies dysphagia, mucositis, trismus or new lumps/bumps/discolorations in his mouth or around his head, neck or shoulders.   Denies chills, fever, fatigue SOB or chest pain. Denies, dizziness, instability, vision changes, hearing changes or focal sensory/motor deficits.  He does  Denies abdominal pain, nausea, vomiting, constipation, or diarrhea.       Flexible fiberoptic laryngopharyngoscopy  Performed via the left nares, after topical anesthesia and decongestant was instilled in the right nares: Nasopharynx: There are no noted lesions in the nasopharynx. The bilateral torus tubari and fossa of Rosenmueller show no lesions and Eustachian tube orifice normal. The mucosal surface of nasopharynx is clear without appreciated masses. The nasopharyngeal surface of the palate has no abnormal lesions. Oropharynx: The tonsillar fossa appears normal bilaterally. The tongue base and vallecula show no lesions. The lingual surface of the epiglottis is normal. The oropharynx is clear of any masses. Larynx: The laryngeal surface of epiglottis, the aryepiglottic folds, the false cords, and the arytenoids are clear of any lesions. The vocal cords have normal mobility and there are no noted lesions. There are no lesions noted in the subglottis.  "Hypopharynx: The hypopharynx appears normal. There are no noted lesions in the  pyriform sinus or postcricoid area. Scope was removed.  Thick secretions were noted throughout the exam. The patient tolerated the procedure well.       Review of Systems:   Review of Systems   Constitutional:  Negative for chills and fatigue.   HENT:   Negative for hearing loss, mouth sores, sore throat, tinnitus and trouble swallowing.    Eyes:  Negative for eye problems.   Respiratory:  Negative for cough, shortness of breath and wheezing.    Cardiovascular:  Negative for chest pain and leg swelling.   Gastrointestinal:  Negative for abdominal distention, abdominal pain, blood in stool, constipation, diarrhea, nausea and vomiting.   Genitourinary:  Negative for difficulty urinating and hematuria.    Musculoskeletal:  Negative for arthralgias, back pain, gait problem, neck pain and neck stiffness.   Neurological:  Negative for dizziness, extremity weakness, gait problem, headaches, numbness, seizures and speech difficulty.   Psychiatric/Behavioral:  Negative for confusion and depression. The patient is not nervous/anxious.      Performance Status:   The Karnofsky performance scale today is 100, Fully active, able to carry on all pre-disease performed without restriction (ECOG equivalent 0).    Pain:  Denies pain today.      OBJECTIVE  Vital Signs:      10/31/2024     1:30 PM 11/7/2024     9:01 AM 11/29/2024    10:09 AM 2/12/2025     6:18 PM 3/5/2025     1:04 PM 3/5/2025     1:42 PM 6/6/2025    10:37 AM   Vitals   Systolic 129  121 155 134 134 136   Diastolic 87  76 97 93 93 87   BP Location   Left arm    Left arm   Heart Rate 72  79 82 69 69 84   Temp 36.4 °C (97.5 °F) 37.1 °C (98.7 °F) 36.6 °C (97.9 °F) 36.4 °C (97.5 °F) 36.2 °C (97.2 °F) 36.2 °C (97.2 °F) 36.6 °C (97.9 °F)   Resp 18  18 18 18 18 18   Height  1.727 m (5' 8\")        Weight (lb) 141 143 141.98  146.39 146 150.13   BMI 21.44 kg/m2 21.74 kg/m2 21.59 kg/m2  22.26 kg/m2 " 22.2 kg/m2 22.83 kg/m2   BSA (m2) 1.75 m2 1.76 m2 1.76 m2  1.78 m2 1.78 m2 1.81 m2   Visit Report Report Report Report  Report Report Report      Physical Exam  Constitutional:       General: He is not in acute distress.     Appearance: Normal appearance. He is normal weight. He is not ill-appearing, toxic-appearing or diaphoretic.   HENT:      Head: Normocephalic and atraumatic. No masses.      Jaw: Trismus present. No tenderness, swelling or pain on movement.      Comments:       Nose: Nose normal. No congestion or rhinorrhea.      Comments:       Mouth/Throat:      Mouth: Mucous membranes are moist. No oral lesions.      Dentition: Normal dentition. No dental caries or gum lesions.      Tongue: No lesions. Tongue does not deviate from midline.      Palate: No mass and lesions.      Pharynx: No pharyngeal swelling.   Eyes:      General: Lids are normal. Gaze aligned appropriately.      Extraocular Movements: Extraocular movements intact.      Pupils: Pupils are equal, round, and reactive to light.   Neck:      Thyroid: No thyroid mass or thyroid tenderness.   Cardiovascular:      Rate and Rhythm: Normal rate and regular rhythm.      Pulses: Normal pulses.      Heart sounds: Normal heart sounds. No murmur heard.  Pulmonary:      Effort: Pulmonary effort is normal. No tachypnea or respiratory distress.      Breath sounds: Normal breath sounds. No wheezing or rhonchi.   Abdominal:      General: Abdomen is flat.      Palpations: Abdomen is soft.   Musculoskeletal:         General: No swelling, tenderness, deformity or signs of injury. Normal range of motion.      Cervical back: Full passive range of motion without pain, normal range of motion and neck supple. No rigidity or tenderness. No pain with movement. Normal range of motion.      Right lower leg: No edema.      Left lower leg: No edema.   Lymphadenopathy:      Head:      Right side of head: No submental, submandibular, tonsillar, preauricular, posterior  auricular or occipital adenopathy.      Left side of head: No submental, submandibular, tonsillar, preauricular, posterior auricular or occipital adenopathy.      Cervical:      Right cervical: No superficial, deep or posterior cervical adenopathy.     Left cervical: No superficial, deep or posterior cervical adenopathy.   Skin:     General: Skin is warm and dry.      Capillary Refill: Capillary refill takes less than 2 seconds.      Coloration: Skin is not jaundiced.      Findings: No erythema, lesion or rash.   Neurological:      General: No focal deficit present.      Mental Status: He is alert and oriented to person, place, and time.      Cranial Nerves: No cranial nerve deficit.      Sensory: No sensory deficit.      Motor: No weakness.      Coordination: Coordination normal.      Gait: Gait normal.   Psychiatric:         Attention and Perception: Attention normal.         Mood and Affect: Mood normal.         Behavior: Behavior normal. Behavior is cooperative.       CT SOFT TISSUE NECK W IV CONTRAST; 2/28/2025 9:50 am    IMPRESSION:  1. Stable nonspecific mucosal thickening of the glossotonsillar sulci  likely reflecting postradiation change. No abnormal enhancement or  discrete mass to indicate local disease recurrence.  2. Scattered nonenlarged but mildly enhancing and more prominent  appearing lymph nodes in the right 2A station, likely reactive,  however continued attention on follow-up is recommended.  3. Left maxillary sinus disease.    6/6/25 [facility] US CAROTID ARTERY DUPLEX BILATERAL   Right Carotid: Findings are consistent with less than 50% stenosis of the right proximal internal carotid artery. Right external carotid artery appears patent with no evidence of stenosis. No evidence of hemodynamically significant stenosis of the right common carotid artery. The right vertebral artery is patent with antegrade flow. No evidence of hemodynamically significant stenosis in the right subclavian artery.  Left  Carotid: Findings are consistent with less than 50% stenosis of the left proximal internal carotid artery. Left external carotid artery appears patent with no evidence of stenosis. No evidence of hemodynamically significant stenosis of the left common carotid artery. The left vertebral artery is patent with antegrade flow. No evidence of hemodynamically significant stenosis in the left subclavian artery.    CT CHEST W IV CONTRAST; 2/28/2025 9:50 am   IMPRESSION:  1.  No evidence of metastatic disease within the chest.  2. Stable size of a 0.3 cm pleural-based nodule within the left lower  lobe. No new or enlarging discrete suspicious pulmonary nodules.  3. Remaining chronic and incidental findings as described above.    NM PET CT WHOLE BODY; 8/29/2024 11:21 am   IMPRESSION:  1. Decreased but persistent FDG uptake is seen in the oropharynx,  compatible with residual viable neoplasm.  2. Interval resolution of the previously noted hypermetabolic  cervical lymph nodes.  3. No hypermetabolic malignancy elsewhere.    ASSESSMENT:   40 y.o. male who was previously seen at the [facility] Department of Radiation Oncology for SCC of the left BOT followed by Chemoradiation (HD Cisplatin and VMAT-- 70Gy in 35 fx) ending on 5/24/24.   The patient continues to do well in long-term survival.  All questions/concerns were addressed to the satisfaction of the patient.     PLAN:      --FU with Laurent Obrien CNP in 3 months.   --NavDx Today.  --Continue to follow with Dr. Reeves and Shaila Fitzgerald PA-C for Medical Oncology management.   --Continue to follow with Dr. Cruz for ENT management.   --Continue to see your dentist at least 3 times/month.   --Continue to see your PCP at least twice a year.        Please reach out with any questions/concerns.     NCCN Guidelines were applicable to guide this patients treatment plan.  DOMINGA Giron-SARWAT

## 2025-06-23 ENCOUNTER — APPOINTMENT (OUTPATIENT)
Dept: HEMATOLOGY/ONCOLOGY | Facility: HOSPITAL | Age: 40
End: 2025-06-23
Payer: COMMERCIAL

## 2025-06-23 ENCOUNTER — APPOINTMENT (OUTPATIENT)
Dept: SPEECH THERAPY | Facility: HOSPITAL | Age: 40
End: 2025-06-23
Payer: COMMERCIAL

## 2025-07-11 ENCOUNTER — OFFICE VISIT (OUTPATIENT)
Dept: OTOLARYNGOLOGY | Facility: HOSPITAL | Age: 40
End: 2025-07-11
Payer: COMMERCIAL

## 2025-07-11 VITALS — HEIGHT: 68 IN | TEMPERATURE: 98.1 F | WEIGHT: 150 LBS | BODY MASS INDEX: 22.73 KG/M2

## 2025-07-11 DIAGNOSIS — Z85.89 PERSONAL HISTORY OF MALIGNANT NEOPLASM OF HEAD AND NECK: Primary | ICD-10-CM

## 2025-07-11 DIAGNOSIS — C10.9 MALIGNANT NEOPLASM OF OROPHARYNX: ICD-10-CM

## 2025-07-11 PROCEDURE — 31575 DIAGNOSTIC LARYNGOSCOPY: CPT | Mod: 25 | Performed by: STUDENT IN AN ORGANIZED HEALTH CARE EDUCATION/TRAINING PROGRAM

## 2025-07-11 PROCEDURE — 99214 OFFICE O/P EST MOD 30 MIN: CPT | Performed by: STUDENT IN AN ORGANIZED HEALTH CARE EDUCATION/TRAINING PROGRAM

## 2025-07-11 ASSESSMENT — PATIENT HEALTH QUESTIONNAIRE - PHQ9
2. FEELING DOWN, DEPRESSED OR HOPELESS: NOT AT ALL
1. LITTLE INTEREST OR PLEASURE IN DOING THINGS: NOT AT ALL
SUM OF ALL RESPONSES TO PHQ9 QUESTIONS 1 & 2: 0

## 2025-07-11 ASSESSMENT — PAIN SCALES - GENERAL: PAINLEVEL_OUTOF10: 0-NO PAIN

## 2025-07-11 NOTE — PROGRESS NOTES
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      Treatment History:   - 3/15/24: In office biopsy of left BOT w/ Dr Cruz.  Path showed p16 positive SCC  - 3/29/24: CT neck and chest showed a soft tissue mass within the left oropharynx centered in the anterior tonsillar pillar/left base of tongue, measures approximately 1.8 x 1.4cm.  - 4/1/24: PET/CT showed multiple hypermetabolic bilateral multilevel cervical lymphadenopathy, largest of which located at left cervical level 2, ~1.2cm.  - 4/16 - 5/28/24: completed concurrent concurrent chemoRT with 2 doses of HD cisplatin and 70 gy RT    Today 7/12/25 he presents for follow up. Last seen by Dr Cruz 11/2024. He reports doing well overall. Tolerating all oral diet. Weight stable, 150 lbs today. No new pain or issues. Follows with Shaila Fitzgerald and Laurent elmore for surveillance.     Tobacco use: The patient  reports that he quit smoking about 2 years ago. His smoking use included cigarettes. He started smoking about 17 years ago. He has a 3.8 pack-year smoking history. He has never used smokeless tobacco.   Alcohol Use: The patient  reports that he does not currently use alcohol.     Physical Examination  Vitals:  There were no vitals taken for this visit.  General: Examination reveals a well-developed, well-nourished patient in no apparent distress.  The patient has no audible dysphonia, stridor or airway distress.  The patient is oriented, alert and responsive.    Oral Cavity:  The patient is able to open the mouth widely without trismus.  The floor of mouth and oral tongue are soft, and no mucosal abnormalities are noted on the lips or within the oral cavity.  The oral tongue is fully mobile and midline on protrusion.  The patient's teeth are grossly normal, some postradiation degeneration  Oropharynx:  There are no mucosal abnormalities noted within the oropharynx.  The soft palate elevates symmetrically, the tonsils and base of tongue are normal to inspection and palpation. Some residual trismus, improving.  Salivary glands: There are no palpable masses of the parotid or submandibular glands.   Neuro: Cranial nerves 2-12 are without obvious abnormality.  Neck: The neck is soft and symmetric.  There is no palpable adenopathy. Mild radiation fibrosis L>R     Procedure Note:  Diagnostic Flexible Laryngoscopy (39334)  Indication: patient symptoms requiring evaluation of pharyngeal/laryngeal/hypopharyngeal structures  Surgeon: Eliza Chin MD  Informed Consent: The procedure, risks, and benefits were discussed with the patient and verbal consent obtained to proceed.   Procedure: Topical anesthesia (lidocaine) was used to spray the nasal mucosa.  A flexible laryngoscopy was inserted through the nasal cavity. The scope was then passed through the nasopharynx, oropharynx, and supraglottis. The vallecula, hypopharynx, supraglottis, glottis and subglottis were then visualized. The scope was then removed. The patient tolerated the procedure with no complications.     Findings: All of the visualized areas noted above were noted to be without evidence of lesions, ulcerations or masses.  The vocal folds adduct and abduct fully. Post-treatment changes were noted throughout with no masses or lesions.    Attestation: I performed the procedure in its entirety, or was present with the resident/fellow for the entirety of the procedure.      DATA REVIEWED:  Labs:  Lab Results   Component Value Date    WBC 5.2 05/29/2025    HGB 12.3 (L) 05/29/2025    HCT 37.2 (L) 05/29/2025     05/29/2025    NEUTROABS 3.45 05/29/2025     Lab Results   Component Value Date    TSH 1.96 02/24/2025        Radiology: I personally reviewed theCT neck with contrast from 6/6/25: showing some mucosal thickening in the left base of tongue likely  post radiation, decrease in size of prior lymph nodes.    Chart review: I reviewed the patients chart including clinic note by Dr Cruz from 11/6/24 detailing reassuring exam findings and recommendation for continued close surveillance.      Pathology: I reviewed the pathology report from the biopsy from 3/15/24 which demonstrated p16+ SCC     ASSESSMENT and PLAN:    T1N2M0 p16+ SCC of the left base of tongue s/p completion of chemoradiation 5/28/24  At risk for hypothyroidism  - HUI today on exam or flex scope, recent CT neck is also reassuring. Now over 1 year out from completion of treatment  - Recent TSH was normal  - Overall doing well with expected post radiation side effects and no signs of recurrence  - Reviewed NCCN guidelines for continued surveillance. Discussed 3 month follow up, he follows with Concha, wishes to space out further. Will see him in 6 months, certainly sooner if any new concerns  Elizakaylyn Chin MD

## 2025-09-29 ENCOUNTER — APPOINTMENT (OUTPATIENT)
Dept: PRIMARY CARE | Facility: CLINIC | Age: 40
End: 2025-09-29
Payer: COMMERCIAL